# Patient Record
Sex: FEMALE | Race: WHITE | NOT HISPANIC OR LATINO | Employment: OTHER | URBAN - METROPOLITAN AREA
[De-identification: names, ages, dates, MRNs, and addresses within clinical notes are randomized per-mention and may not be internally consistent; named-entity substitution may affect disease eponyms.]

---

## 2017-03-20 RX ORDER — INSULIN DEGLUDEC 200 U/ML
INJECTION, SOLUTION SUBCUTANEOUS
Qty: 9 PEN | Refills: 3 | Status: SHIPPED | OUTPATIENT
Start: 2017-03-20 | End: 2017-06-12 | Stop reason: SDUPTHER

## 2017-03-24 DIAGNOSIS — IMO0002 UNCONTROLLED TYPE 2 DIABETES MELLITUS WITH COMPLICATION, WITH LONG-TERM CURRENT USE OF INSULIN: Primary | ICD-10-CM

## 2017-03-30 ENCOUNTER — LAB (OUTPATIENT)
Dept: LAB | Facility: HOSPITAL | Age: 67
End: 2017-03-30

## 2017-03-30 DIAGNOSIS — D50.9 IRON DEFICIENCY ANEMIA, UNSPECIFIED IRON DEFICIENCY ANEMIA TYPE: ICD-10-CM

## 2017-03-30 LAB
ALBUMIN SERPL-MCNC: 4.1 G/DL (ref 3.5–5.2)
ALBUMIN/GLOB SERPL: 1.8 G/DL (ref 1.1–2.4)
ALP SERPL-CCNC: 39 U/L (ref 38–116)
ALT SERPL W P-5'-P-CCNC: 24 U/L (ref 0–33)
ANION GAP SERPL CALCULATED.3IONS-SCNC: 11.6 MMOL/L
AST SERPL-CCNC: 24 U/L (ref 0–32)
BASOPHILS # BLD AUTO: 0.06 10*3/MM3 (ref 0–0.1)
BASOPHILS NFR BLD AUTO: 0.9 % (ref 0–1.1)
BILIRUB SERPL-MCNC: 0.5 MG/DL (ref 0.1–1.2)
BUN BLD-MCNC: 27 MG/DL (ref 6–20)
BUN/CREAT SERPL: 30.7 (ref 7.3–30)
CALCIUM SPEC-SCNC: 9.5 MG/DL (ref 8.5–10.2)
CHLORIDE SERPL-SCNC: 105 MMOL/L (ref 98–107)
CO2 SERPL-SCNC: 25.4 MMOL/L (ref 22–29)
CREAT BLD-MCNC: 0.88 MG/DL (ref 0.6–1.1)
DEPRECATED RDW RBC AUTO: 44.7 FL (ref 37–49)
EOSINOPHIL # BLD AUTO: 0.48 10*3/MM3 (ref 0–0.36)
EOSINOPHIL NFR BLD AUTO: 6.9 % (ref 1–5)
ERYTHROCYTE [DISTWIDTH] IN BLOOD BY AUTOMATED COUNT: 14.6 % (ref 11.7–14.5)
FERRITIN SERPL-MCNC: 108.6 NG/ML
GFR SERPL CREATININE-BSD FRML MDRD: 64 ML/MIN/1.73
GLOBULIN UR ELPH-MCNC: 2.3 GM/DL (ref 1.8–3.5)
GLUCOSE BLD-MCNC: 128 MG/DL (ref 74–124)
HCT VFR BLD AUTO: 35 % (ref 34–45)
HGB BLD-MCNC: 11.6 G/DL (ref 11.5–14.9)
HGB RETIC QN: 32.2 PG (ref 29.8–36.1)
IMM GRANULOCYTES # BLD: 0.06 10*3/MM3 (ref 0–0.03)
IMM GRANULOCYTES NFR BLD: 0.9 % (ref 0–0.5)
IMM RETICS NFR: 14 % (ref 3–15.8)
IRON 24H UR-MRATE: 64 MCG/DL (ref 37–145)
IRON SATN MFR SERPL: 15 % (ref 14–48)
LYMPHOCYTES # BLD AUTO: 1.37 10*3/MM3 (ref 1–3.5)
LYMPHOCYTES NFR BLD AUTO: 19.6 % (ref 20–49)
MCH RBC QN AUTO: 28.1 PG (ref 27–33)
MCHC RBC AUTO-ENTMCNC: 33.1 G/DL (ref 32–35)
MCV RBC AUTO: 84.7 FL (ref 83–97)
MONOCYTES # BLD AUTO: 0.46 10*3/MM3 (ref 0.25–0.8)
MONOCYTES NFR BLD AUTO: 6.6 % (ref 4–12)
NEUTROPHILS # BLD AUTO: 4.57 10*3/MM3 (ref 1.5–7)
NEUTROPHILS NFR BLD AUTO: 65.1 % (ref 39–75)
NRBC BLD MANUAL-RTO: 0 /100 WBC (ref 0–0)
PLATELET # BLD AUTO: 270 10*3/MM3 (ref 150–375)
PMV BLD AUTO: 10.7 FL (ref 8.9–12.1)
POTASSIUM BLD-SCNC: 4.4 MMOL/L (ref 3.5–4.7)
PROT SERPL-MCNC: 6.4 G/DL (ref 6.3–8)
RBC # BLD AUTO: 4.13 10*6/MM3 (ref 3.9–5)
RETICS/RBC NFR AUTO: 2.02 % (ref 0.6–2)
SODIUM BLD-SCNC: 142 MMOL/L (ref 134–145)
TIBC SERPL-MCNC: 426 MCG/DL (ref 249–505)
TRANSFERRIN SERPL-MCNC: 304 MG/DL (ref 200–360)
VIT B12 BLD-MCNC: 458 PG/ML (ref 250–999)
WBC NRBC COR # BLD: 7 10*3/MM3 (ref 4–10)

## 2017-03-30 PROCEDURE — 84466 ASSAY OF TRANSFERRIN: CPT | Performed by: INTERNAL MEDICINE

## 2017-03-30 PROCEDURE — 82607 VITAMIN B-12: CPT

## 2017-03-30 PROCEDURE — 85025 COMPLETE CBC W/AUTO DIFF WBC: CPT | Performed by: INTERNAL MEDICINE

## 2017-03-30 PROCEDURE — 83540 ASSAY OF IRON: CPT | Performed by: INTERNAL MEDICINE

## 2017-03-30 PROCEDURE — 85046 RETICYTE/HGB CONCENTRATE: CPT | Performed by: INTERNAL MEDICINE

## 2017-03-30 PROCEDURE — 82728 ASSAY OF FERRITIN: CPT | Performed by: INTERNAL MEDICINE

## 2017-03-30 PROCEDURE — 36415 COLL VENOUS BLD VENIPUNCTURE: CPT | Performed by: INTERNAL MEDICINE

## 2017-03-30 PROCEDURE — 80053 COMPREHEN METABOLIC PANEL: CPT | Performed by: INTERNAL MEDICINE

## 2017-04-04 ENCOUNTER — APPOINTMENT (OUTPATIENT)
Dept: LAB | Facility: HOSPITAL | Age: 67
End: 2017-04-04

## 2017-04-04 ENCOUNTER — OFFICE VISIT (OUTPATIENT)
Dept: ONCOLOGY | Facility: CLINIC | Age: 67
End: 2017-04-04

## 2017-04-04 VITALS
BODY MASS INDEX: 25.72 KG/M2 | SYSTOLIC BLOOD PRESSURE: 128 MMHG | RESPIRATION RATE: 12 BRPM | DIASTOLIC BLOOD PRESSURE: 72 MMHG | WEIGHT: 131 LBS | HEART RATE: 69 BPM | TEMPERATURE: 98.6 F | OXYGEN SATURATION: 99 % | HEIGHT: 60 IN

## 2017-04-04 DIAGNOSIS — D50.9 IRON DEFICIENCY ANEMIA, UNSPECIFIED IRON DEFICIENCY ANEMIA TYPE: Primary | ICD-10-CM

## 2017-04-04 PROCEDURE — G0463 HOSPITAL OUTPT CLINIC VISIT: HCPCS | Performed by: INTERNAL MEDICINE

## 2017-04-04 PROCEDURE — 99213 OFFICE O/P EST LOW 20 MIN: CPT | Performed by: INTERNAL MEDICINE

## 2017-04-04 RX ORDER — IBANDRONATE SODIUM 150 MG/1
TABLET, FILM COATED ORAL
COMMUNITY
Start: 2017-04-01 | End: 2017-07-07

## 2017-04-04 RX ORDER — METOCLOPRAMIDE 10 MG/1
10 TABLET ORAL 4 TIMES DAILY
COMMUNITY
Start: 2017-03-26 | End: 2017-04-21

## 2017-04-04 RX ORDER — TOPIRAMATE 25 MG/1
TABLET ORAL
COMMUNITY
Start: 2017-01-09 | End: 2017-04-04

## 2017-04-04 NOTE — PROGRESS NOTES
[facility] GROUP OUTPATIENT FOLLOW UP CLINIC VISIT    REASON FOR FOLLOW-UP:    1.  Iron deficiency anemia.  Her last intravenous Feraheme infusions or in October 2015.    HISTORY OF PRESENT ILLNESS:  Shirin Washington is a 66 y.o. female who returns today for follow up of the above issue.  She had blood counts done last week that fortunately are normal.    However, she continues to not feel well.  She was started on alprazolam for her vertigo which she states has improved her symptoms by about 75%.  She was actually weaned off of this and her symptoms worsened significantly and so now she is back on it.  She was diagnosed with osteoporosis and was started on Boniva which has caused her some constipation and gastric upset.  As result of this, she was started on Reglan but she has not noticed any significant difference in her symptoms.  She reports ongoing fatigue.  She is only able to play 9 holes of golf now.  She has dyspnea on exertion and some chest tightness with significant activity.  She is scheduled to see Dr. Araujo with cardiology within the next couple of weeks after having some cardiac testing showing an elevated coronary calcium score.  A couple of years ago she did have an echocardiogram that showed a normal left ventricular ejection fraction and had a coronary catheterization that showed an isolated 50% narrowing of the LAD but no other significant coronary artery disease.    She denies any bleeding.  She continues to have some back discomfort following her surgery.      PAST MEDICAL, SURGICAL, FAMILY, AND SOCIAL HISTORIES were reviewed with the patient and in the electronic medical record, and were updated if indicated.    ALLERGIES:  Allergies   Allergen Reactions   • Topamax [Topiramate]        MEDICATIONS:  The medication list has been reviewed with the patient by the medical assistant, and the list has been updated in the electronic medical record, which I reviewed.  Medication dosages and  "frequencies were confirmed to be accurate.    REVIEW OF SYSTEMS:  PAIN:  See Vital Signs below.  GENERAL:  No fevers, chills, night sweats, or unintended weight loss.  Fatigue.  See history of present illness.  SKIN:  No rashes or non-healing lesions.  HEME/LYMPH:  No abnormal bleeding.  No palpable lymphadenopathy.  EYES:  No vision changes or diplopia.  ENT:  Vertigo as noted in the history of present illness  RESPIRATORY:  No cough, shortness of breath, hemoptysis, or wheezing.  CARDIOVASCULAR:  See history of present illness  GASTROINTESTINAL:  See history of present illness  GENITOURINARY:  No dysuria or hematuria.  MUSCULOSKELETAL:  No joint pain, swelling, or erythema.  NEUROLOGIC:  No dizziness, loss of consciousness, or seizures.  PSYCHIATRIC:  No depression, anxiety, or mood changes.    Vitals:    04/04/17 1009   BP: 128/72   Pulse: 69   Resp: 12   Temp: 98.6 °F (37 °C)   TempSrc: Oral   SpO2: 99%   Weight: 131 lb (59.4 kg)   Height: 59.72\" (151.7 cm)  Comment: new height   PainSc: 0-No pain       PHYSICAL EXAMINATION:  GENERAL:  Well-developed well-nourished female; awake, alert and oriented, in no acute distress.  SKIN:  Warm and dry, without rashes, purpura, or petechiae.  HEAD:  Normocephalic, atraumatic.  EYES:  Pupils equal, round and reactive to light.  Extraocular movements intact.  Conjunctivae normal.  EARS:  Hearing intact.  NOSE:  Septum midline.  No excoriations or nasal discharge.  MOUTH:  No stomatitis or ulcers.  Lips are normal.  THROAT:  Oropharynx without lesions or exudates.  NECK:  Supple with good range of motion; no thyromegaly or masses; no JVD or bruits.  LYMPHATICS:  No cervical, supraclavicular, axillary, or inguinal lymphadenopathy.  CHEST:  Lungs are clear to auscultation bilaterally.  No wheezes, rales, or rhonchi.  HEART:  Regular rate; normal rhythm.  No murmurs, gallops or rubs.  ABDOMEN:  Soft, non-tender, non-distended.  Normal active bowel sounds.  No " organomegaly.  EXTREMITIES:  No clubbing, cyanosis, or edema.  NEUROLOGICAL:  No focal neurologic deficits.    DIAGNOSTIC DATA:  Lab Results   Component Value Date    WBC 7.00 03/30/2017    HGB 11.6 03/30/2017    HCT 35.0 03/30/2017    MCV 84.7 03/30/2017     03/30/2017     Vitamin B12 458  Ferritin 108, down from 207  Complete metabolic panel normal    ASSESSMENT:  This is a 66 y.o. female with:  1.  History of iron deficiency anemia: She required intravenous Feraheme last in October 2015.  Iron studies are normal at this time.  Her blood counts are normal.  The etiology of her iron deficiency is not entirely clear.  Her ferritin remains normal but has dropped from 207 to 108 in the absence of obvious bleeding.  We will continue to monitor this.  There is no indication to pursue iron supplementation at this point.  In addition, her B12 level was at the low end of the normal range.  She received some B12 injections.  Her vitamin B12 level is normal.  We will continue intermittent monitoring.  2.  Fatigue with dyspnea on exertion and chest tightness: An echocardiogram on 7/3/2014 showed normal left ventricular ejection fraction of 64%.  Grade 1 diastolic dysfunction was noted.  Borderline concentric LVH was observed.  Mild aortic and trace tricuspid and pulmonic valve regurgitation noted.  A cardiac catheterization on 1/10/2014 showed a 50% narrowing of the LAD but no other evidence for coronary artery disease.  She is going to see Dr. Araujo for a cardiovascular evaluation in the near future.  Certainly ruling out cardiovascular causes of her symptoms seems pertinent.  Otherwise, I suggested that polypharmacy could be contributing if there are no other obvious reasons for her symptoms.  The alprazolam is helping her vertigo but could certainly be causing some fatigue.  Beta blockers can cause fatigue.  Reglan could be contributing to symptoms.  She is on Ambien 10 mg nightly for chronic insomnia.  3.  Gastric  upset potentially related to Boniva: I advised her to speak with her treating physician regarding alternative medication.  She will likely discontinue the Reglan as well since she has noted no benefit from this.  She could potentially have gastroparesis.    PLAN:  1.  Return in 6 months for follow-up with a laboratory evaluation done a few days prior to that.  I can certainly see her sooner if she is feeling worrisome believes it may be related to worsening anemia or iron deficiency.

## 2017-04-19 ENCOUNTER — OFFICE VISIT (OUTPATIENT)
Dept: CARDIOLOGY | Facility: CLINIC | Age: 67
End: 2017-04-19

## 2017-04-19 VITALS
SYSTOLIC BLOOD PRESSURE: 140 MMHG | HEIGHT: 60 IN | DIASTOLIC BLOOD PRESSURE: 76 MMHG | BODY MASS INDEX: 25.72 KG/M2 | WEIGHT: 131 LBS | HEART RATE: 78 BPM

## 2017-04-19 DIAGNOSIS — E11.9 TYPE 2 DIABETES MELLITUS WITHOUT COMPLICATION, WITH LONG-TERM CURRENT USE OF INSULIN (HCC): ICD-10-CM

## 2017-04-19 DIAGNOSIS — R07.89 CHEST DISCOMFORT: Primary | ICD-10-CM

## 2017-04-19 DIAGNOSIS — Z79.4 TYPE 2 DIABETES MELLITUS WITHOUT COMPLICATION, WITH LONG-TERM CURRENT USE OF INSULIN (HCC): ICD-10-CM

## 2017-04-19 DIAGNOSIS — R93.1 AGATSTON CORONARY ARTERY CALCIUM SCORE GREATER THAN 400: ICD-10-CM

## 2017-04-19 DIAGNOSIS — I10 ESSENTIAL HYPERTENSION: ICD-10-CM

## 2017-04-19 PROCEDURE — 99204 OFFICE O/P NEW MOD 45 MIN: CPT | Performed by: INTERNAL MEDICINE

## 2017-04-19 PROCEDURE — 93000 ELECTROCARDIOGRAM COMPLETE: CPT | Performed by: INTERNAL MEDICINE

## 2017-04-19 NOTE — PROGRESS NOTES
Date of Office Visit: 2017  Encounter Provider: Tomás Araujo MD  Place of Service: [facility] CARDIOLOGY  Patient Name: Shirin Washington  :1950    Chief Complaint   Patient presents with   • Abnormal Imaging     CT Calcium Score    :     HPI: Shirin Washington is a 66 y.o. female who presents today at the request of Dr. Domingo regarding an abnormal CACS.  I actually saw her in our CPU in 2014 when she presented with symptoms concerning for unstable angina.  Cardiac catheterization revealed diffuse extraluminal coronary artery calcification, and a 50% lesion in the mid LAD which was not felt to be hemodynamically significant.  I have not seen her since.    She recently had the CACS performed for risk stratification; her score was 655, and it was fairly evenly distributed among her coronaries.      She will feel chest tightness/dyspnea if she really exerts herself to a very significant degree (ie trying to walk much faster than she's able, or going up a long tall hill).  She doesn't have any symptoms with regular levels of activity.  She has become sedentary since back surgery and is trying to increase her physical activity.  She denies dyspnea with regular activities, edema, orthopnea, PND, palpitations, or syncope.        Past Medical History:   Diagnosis Date   • Anemia    • Arthritis    • Back pain    • Background diabetic retinopathy of right eye determined by examination    • Coronary atherosclerosis     cath 2014 with 50% mid LAD, otherwise unremarkable   • Diabetes mellitus type 2, insulin dependent    • Gastroparesis due to secondary diabetes    • GERD (gastroesophageal reflux disease)    • History of URI (upper respiratory infection)    • Hypercholesteremia    • Hyperlipidemia    • Hypertension    • Iron deficiency anemia    • Left-sided weakness     ARM AND LEG   • Leukocytosis     MILD   • Osteopenia    • Peripheral neuropathy    • Vertigo        Past  Surgical History:   Procedure Laterality Date   • BACK SURGERY      BY DR NIXON   • CARDIAC CATHETERIZATION      ARTERIAL CATHETERIZATION   • CHOLECYSTECTOMY     • HYSTERECTOMY     • KNEE SURGERY Right        Social History     Social History   • Marital status:      Spouse name: N/A   • Number of children: N/A   • Years of education: N/A     Occupational History   • 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   Cardiovascular: Positive for chest pain and dyspnea on exertion.   Endocrine: Positive for cold intolerance.   Musculoskeletal: Positive for back pain and joint pain.   Gastrointestinal: Positive for nausea.       Allergies   Allergen Reactions   • Topamax [Topiramate]          Current Outpatient Prescriptions:   •  ALPRAZolam (XANAX) 0.25 MG tablet, 2 (Two) Times a Day., Disp: , Rfl:   •  amLODIPine (NORVASC) 2.5 MG tablet, Take  by mouth Daily., Disp: , Rfl:   •  aspirin 81 MG EC tablet, Take  by mouth Daily., Disp: , Rfl:   •  atenolol (TENORMIN) 25 MG tablet, Take 25 mg by mouth Daily., Disp: , Rfl:   •  atorvastatin (LIPITOR) 80 MG tablet, Take  by mouth Daily., Disp: , Rfl:   •  fenofibrate (TRICOR) 145 MG tablet, Take  by mouth Daily., Disp: , Rfl:   •  gentamicin (GARAMYCIN) 0.3 % ophthalmic solution, , Disp: , Rfl:   •  ibandronate (BONIVA) 150 MG tablet, Every 30 (Thirty) Days., Disp: , Rfl:   •  insulin aspart (novoLOG FLEXPEN) 100 UNIT/ML solution pen-injector sc pen,  "Inject 40 Units under the skin 3 (Three) Times a Day With Meals. (Patient taking differently: Inject 10 Units under the skin 3 (Three) Times a Day With Meals.), Disp: 15 pen, Rfl: 3  •  meloxicam (MOBIC) 15 MG tablet, Take 15 mg by mouth daily., Disp: , Rfl:   •  metoclopramide (REGLAN) 10 MG tablet, , Disp: , Rfl:   •  ONETOUCH DELICA LANCETS 33G misc, , Disp: , Rfl:   •  TRESIBA FLEXTOUCH 200 UNIT/ML solution pen-injector, INJECT 20 UNITS UNDER THE SKIN DAILY, Disp: 9 pen, Rfl: 3  •  triamterene-hydrochlorothiazide (MAXZIDE-25) 37.5-25 MG per tablet, Take  by mouth. TAKES ONE HALF TABLET DAILY, Disp: , Rfl:   •  zolpidem (AMBIEN) 10 MG tablet, Take  by mouth., Disp: , Rfl:   •  glucose blood test strip, OneTouch Ultra Blue In Vitro Strip TO TEST 8 TIMES DAILY, Disp: 720 each, Rfl: 1  •  Insulin Pen Needle (BD PEN NEEDLE SOCO U/F) 32G X 4 MM misc, 4 times daily, Disp: 200 each, Rfl: 3     Objective:     Vitals:    04/19/17 1101   BP: 140/76   Pulse: 78   Weight: 131 lb (59.4 kg)   Height: 60\" (152.4 cm)     Body mass index is 25.58 kg/(m^2).    Physical Exam   Constitutional: She is oriented to person, place, and time. She appears well-developed and well-nourished.   HENT:   Head: Normocephalic.   Nose: Nose normal.   Mouth/Throat: Oropharynx is clear and moist.   Eyes: Conjunctivae and EOM are normal. Pupils are equal, round, and reactive to light.   Neck: Normal range of motion. No JVD present.   Cardiovascular: Normal rate, regular rhythm, normal heart sounds and intact distal pulses.    No murmur heard.  Pulmonary/Chest: Effort normal and breath sounds normal.   Abdominal: Soft. She exhibits no mass. There is no tenderness.   Musculoskeletal: Normal range of motion. She exhibits no edema.   Lymphadenopathy:     She has no cervical adenopathy.   Neurological: She is alert and oriented to person, place, and time. No cranial nerve deficit.   Skin: Skin is warm and dry. No rash noted.   Psychiatric: She has a " normal mood and affect. Her behavior is normal. Judgment and thought content normal.   Vitals reviewed.        ECG 12 Lead  Date/Time: 4/19/2017 4:38 PM  Performed by: TOMÁS ARAUJO  Authorized by: TOMÁS ARAUJO   Comparison: compared with previous ECG   Similar to previous ECG  Rhythm: sinus rhythm  Conduction: conduction normal  ST Segments: ST segments normal  T Waves: T waves normal  QRS axis: normal  Other: no other findings  Clinical impression: normal ECG              Assessment:       Diagnosis Plan   1. Chest discomfort  Stress Test With Myocardial Perfusion One Day   2. Agatston coronary artery calcium score greater than 400  Stress Test With Myocardial Perfusion One Day   3. Essential hypertension     4. Type 2 diabetes mellitus without complication, with long-term current use of insulin            Plan:       Mrs. Washington is an insulin-dependent diabetic with a known history of significant coronary artery calcification and nonobstructive CAD.  Her CACS is moderately elevated (but to be honest, I am actually surprised it's not higher than it is given her history)!  She has symptoms of chest discomfort and dyspnea with significant levels of exertion but not with regular activities.  I have recommended a regadenoson Myoview stress test for further evaluation.      If this is normal, I recommend improved medical therapy (she's on aspirin, atenolol, and atorvastatin, but may tolerate a higher dose of the beta blocker). Obviously if it's abnormal, then further workup will be recommended.      I would recommend cessation of meloxicam given its increased incidence of cardiovascular mortality.      Sincerely,       Tomás Araujo MD

## 2017-04-21 ENCOUNTER — OFFICE VISIT (OUTPATIENT)
Dept: ORTHOPEDIC SURGERY | Facility: CLINIC | Age: 67
End: 2017-04-21

## 2017-04-21 VITALS — TEMPERATURE: 98.2 F | WEIGHT: 131 LBS | HEIGHT: 60 IN | BODY MASS INDEX: 25.72 KG/M2

## 2017-04-21 DIAGNOSIS — M25.552 HIP PAIN, LEFT: Primary | ICD-10-CM

## 2017-04-21 PROCEDURE — 73502 X-RAY EXAM HIP UNI 2-3 VIEWS: CPT | Performed by: NURSE PRACTITIONER

## 2017-04-21 PROCEDURE — 99213 OFFICE O/P EST LOW 20 MIN: CPT | Performed by: NURSE PRACTITIONER

## 2017-04-21 NOTE — PROGRESS NOTES
Patient: Shirin Washington  YOB: 1950 66 y.o. female  Medical Record Number: [id number]    Chief Complaints:   Chief Complaint   Patient presents with   • Left Hip - Establish Care       History of Present Illness:Shirin Washington is a 66 y.o. female who presents With complaints of left hip pain.  Patient reports that it started several months ago.  She describes it as a moderate intermittent grinding type pain worse with standing and walking slightly better with rest.  She does have a significant history of low back issues with previous back surgery.  She recently saw her primary care physician who looked at her DEXA scan and told her she should come here.  She was recently started on Boniva for osteoporosis by her gynecologist    Allergies:   Allergies   Allergen Reactions   • Topamax [Topiramate]        Medications:   Current Outpatient Prescriptions   Medication Sig Dispense Refill   • ALPRAZolam (XANAX) 0.25 MG tablet 2 (Two) Times a Day.     • amLODIPine (NORVASC) 2.5 MG tablet Take  by mouth Daily.     • aspirin 81 MG EC tablet Take  by mouth Daily.     • atenolol (TENORMIN) 25 MG tablet Take 25 mg by mouth Daily.     • atorvastatin (LIPITOR) 80 MG tablet Take  by mouth Daily.     • fenofibrate (TRICOR) 145 MG tablet Take  by mouth Daily.     • glucose blood test strip OneTouch Ultra Blue In Vitro Strip TO TEST 8 TIMES DAILY 720 each 1   • insulin aspart (novoLOG FLEXPEN) 100 UNIT/ML solution pen-injector sc pen Inject 40 Units under the skin 3 (Three) Times a Day With Meals. (Patient taking differently: Inject 10 Units under the skin 3 (Three) Times a Day With Meals.) 15 pen 3   • Insulin Pen Needle (BD PEN NEEDLE SOCO U/F) 32G X 4 MM misc 4 times daily 200 each 3   • meloxicam (MOBIC) 15 MG tablet Take 15 mg by mouth daily.     • ONETOUCH DELICA LANCETS 33G misc      • TRESIBA FLEXTOUCH 200 UNIT/ML solution pen-injector INJECT 20 UNITS UNDER THE SKIN DAILY 9 pen 3   •  "triamterene-hydrochlorothiazide (MAXZIDE-25) 37.5-25 MG per tablet Take  by mouth. TAKES ONE HALF TABLET DAILY     • zolpidem (AMBIEN) 10 MG tablet Take  by mouth.     • ibandronate (BONIVA) 150 MG tablet Every 30 (Thirty) Days.       No current facility-administered medications for this visit.          The following portions of the patient's history were reviewed and updated as appropriate: allergies, current medications, past family history, past medical history, past social history, past surgical history and problem list.    Review of Systems:   A 14 point review of systems was performed. All systems negative except pertinent positives/negative listed in HPI above    Physical Exam:   Vitals:    04/21/17 1320   Temp: 98.2 °F (36.8 °C)   TempSrc: Temporal Artery    Weight: 131 lb (59.4 kg)   Height: 60\" (152.4 cm)       General: A and O x 3, ASA, NAD    SCLERA:    Normal    DENTITION:   Normal  Skin clear no unusual lesions noted  Left hip patient is nontender palpation she has mild pain with internal/external rotation with a negative Stinchfield negative logroll calf soft nontender neurologic intact pedal pulses normal bilaterally    Radiology:  Xrays 2 views left hip were ordered and reviewed today secondary to pain and show some arthritic changes.  No comparative views available    Assessment/Plan:  Left hip pain    It's difficult to tell whether the patient's pain is coming from her hip or from her lower back.  We will proceed with a fluoroscopy guided cortisone injection of the left hip joint and the patient will follow up with Dr. Wen in approximately 2-3 weeks after to see if that helps differentiate the etiology of her pain.  She is currently on meloxicam.  In addition we will defer her treatment for osteoporosis to her gynecologist or primary care physician.  "

## 2017-04-27 ENCOUNTER — HOSPITAL ENCOUNTER (OUTPATIENT)
Dept: CARDIOLOGY | Facility: HOSPITAL | Age: 67
Discharge: HOME | End: 2017-04-27
Attending: INTERNAL MEDICINE

## 2017-04-27 DIAGNOSIS — R93.1 AGATSTON CORONARY ARTERY CALCIUM SCORE GREATER THAN 400: ICD-10-CM

## 2017-04-27 DIAGNOSIS — R07.89 CHEST DISCOMFORT: ICD-10-CM

## 2017-05-01 ENCOUNTER — APPOINTMENT (OUTPATIENT)
Dept: GENERAL RADIOLOGY | Facility: HOSPITAL | Age: 67
End: 2017-05-01

## 2017-05-01 ENCOUNTER — HOSPITAL ENCOUNTER (OUTPATIENT)
Dept: CARDIOLOGY | Facility: HOSPITAL | Age: 67
Discharge: HOME | End: 2017-05-01
Attending: INTERNAL MEDICINE | Admitting: INTERNAL MEDICINE

## 2017-05-01 LAB
BH CV NUCLEAR PRIOR STUDY: 2
BH CV STRESS BP STAGE 1: NORMAL
BH CV STRESS BP STAGE 2: NORMAL
BH CV STRESS DURATION MIN STAGE 1: 3
BH CV STRESS DURATION MIN STAGE 2: 1
BH CV STRESS DURATION SEC STAGE 1: 0
BH CV STRESS DURATION SEC STAGE 2: 30
BH CV STRESS GRADE STAGE 1: 10
BH CV STRESS GRADE STAGE 2: 12
BH CV STRESS HR STAGE 1: 151
BH CV STRESS HR STAGE 2: 164
BH CV STRESS METS STAGE 1: 5
BH CV STRESS METS STAGE 2: 7.5
BH CV STRESS PROTOCOL 1: NORMAL
BH CV STRESS RECOVERY BP: NORMAL MMHG
BH CV STRESS RECOVERY HR: 98 BPM
BH CV STRESS SPEED STAGE 1: 1.7
BH CV STRESS SPEED STAGE 2: 2.5
BH CV STRESS STAGE 1: 1
BH CV STRESS STAGE 2: 2
LV EF NUC BP: 72 %
MAXIMAL PREDICTED HEART RATE: 152 BPM
PERCENT MAX PREDICTED HR: 107.89 %
STRESS BASELINE BP: NORMAL MMHG
STRESS BASELINE HR: 94 BPM
STRESS PERCENT HR: 127 %
STRESS POST ESTIMATED WORKLOAD: 6 METS
STRESS POST EXERCISE DUR MIN: 4 MIN
STRESS POST EXERCISE DUR SEC: 30 SEC
STRESS POST PEAK BP: NORMAL MMHG
STRESS POST PEAK HR: 164 BPM
STRESS TARGET HR: 129 BPM

## 2017-05-01 PROCEDURE — 0 TECHNETIUM TETROFOSMIN KIT: Performed by: INTERNAL MEDICINE

## 2017-05-01 PROCEDURE — 78452 HT MUSCLE IMAGE SPECT MULT: CPT | Performed by: INTERNAL MEDICINE

## 2017-05-01 PROCEDURE — 78452 HT MUSCLE IMAGE SPECT MULT: CPT

## 2017-05-01 PROCEDURE — 93017 CV STRESS TEST TRACING ONLY: CPT

## 2017-05-01 PROCEDURE — A9502 TC99M TETROFOSMIN: HCPCS | Performed by: INTERNAL MEDICINE

## 2017-05-01 PROCEDURE — 93018 CV STRESS TEST I&R ONLY: CPT | Performed by: INTERNAL MEDICINE

## 2017-05-01 PROCEDURE — 93016 CV STRESS TEST SUPVJ ONLY: CPT | Performed by: INTERNAL MEDICINE

## 2017-05-01 RX ORDER — ATENOLOL 50 MG/1
50 TABLET ORAL DAILY
Qty: 30 TABLET | Refills: 6 | Status: SHIPPED | OUTPATIENT
Start: 2017-05-01

## 2017-05-01 RX ADMIN — TETROFOSMIN 1 DOSE: 1.38 INJECTION, POWDER, LYOPHILIZED, FOR SOLUTION INTRAVENOUS at 09:25

## 2017-05-01 RX ADMIN — TETROFOSMIN 1 DOSE: 1.38 INJECTION, POWDER, LYOPHILIZED, FOR SOLUTION INTRAVENOUS at 08:30

## 2017-05-05 ENCOUNTER — APPOINTMENT (OUTPATIENT)
Dept: GENERAL RADIOLOGY | Facility: HOSPITAL | Age: 67
End: 2017-05-05

## 2017-05-12 ENCOUNTER — TRANSCRIBE ORDERS (OUTPATIENT)
Dept: ADMINISTRATIVE | Facility: HOSPITAL | Age: 67
End: 2017-05-12

## 2017-05-12 DIAGNOSIS — M81.0 OSTEOPOROSIS: Primary | ICD-10-CM

## 2017-05-18 PROBLEM — M81.0 OSTEOPOROSIS: Status: ACTIVE | Noted: 2017-05-18

## 2017-05-19 ENCOUNTER — HOSPITAL ENCOUNTER (OUTPATIENT)
Dept: INFUSION THERAPY | Facility: HOSPITAL | Age: 67
Discharge: HOME | End: 2017-05-19
Admitting: INTERNAL MEDICINE

## 2017-05-19 VITALS
DIASTOLIC BLOOD PRESSURE: 62 MMHG | BODY MASS INDEX: 25.91 KG/M2 | TEMPERATURE: 97.2 F | HEIGHT: 60 IN | SYSTOLIC BLOOD PRESSURE: 148 MMHG | WEIGHT: 132 LBS | RESPIRATION RATE: 20 BRPM | OXYGEN SATURATION: 100 % | HEART RATE: 65 BPM

## 2017-05-19 DIAGNOSIS — M81.0 OSTEOPOROSIS: ICD-10-CM

## 2017-05-19 PROCEDURE — 96401 CHEMO ANTI-NEOPL SQ/IM: CPT

## 2017-05-19 PROCEDURE — 25010000002 DENOSUMAB 60 MG/ML SOLUTION: Performed by: INTERNAL MEDICINE

## 2017-05-19 RX ADMIN — DENOSUMAB 60 MG: 60 INJECTION SUBCUTANEOUS at 10:52

## 2017-07-07 ENCOUNTER — OFFICE VISIT (OUTPATIENT)
Dept: ENDOCRINOLOGY | Age: 67
End: 2017-07-07

## 2017-07-07 VITALS
HEIGHT: 60 IN | BODY MASS INDEX: 26.19 KG/M2 | DIASTOLIC BLOOD PRESSURE: 78 MMHG | SYSTOLIC BLOOD PRESSURE: 136 MMHG | HEART RATE: 87 BPM | OXYGEN SATURATION: 97 % | WEIGHT: 133.4 LBS

## 2017-07-07 DIAGNOSIS — E16.1 HYPERINSULINISM: ICD-10-CM

## 2017-07-07 DIAGNOSIS — Z79.4 ENCOUNTER FOR LONG-TERM (CURRENT) USE OF INSULIN (HCC): ICD-10-CM

## 2017-07-07 DIAGNOSIS — E78.5 HYPERLIPIDEMIA, UNSPECIFIED HYPERLIPIDEMIA TYPE: ICD-10-CM

## 2017-07-07 DIAGNOSIS — M81.0 OSTEOPOROSIS: ICD-10-CM

## 2017-07-07 DIAGNOSIS — IMO0002 UNCONTROLLED TYPE 2 DIABETES MELLITUS WITH BACKGROUND RETINOPATHY: ICD-10-CM

## 2017-07-07 DIAGNOSIS — IMO0002 UNCONTROLLED TYPE 2 DIABETES MELLITUS WITH COMPLICATION, WITH LONG-TERM CURRENT USE OF INSULIN: Primary | ICD-10-CM

## 2017-07-07 PROCEDURE — 99214 OFFICE O/P EST MOD 30 MIN: CPT | Performed by: INTERNAL MEDICINE

## 2017-07-07 NOTE — PROGRESS NOTES
67 y.o.    Patient Care Team:  Duncan Domingo MD as PCP - General (Internal Medicine)  Axel Lobato MD as Consulting Physician (Hematology and Oncology)  Chika Ospina MD as Referring Physician (General Surgery)    Chief Complaint:      F/U TYPE 2 DIABETES, UNCONTROLLED.  NO RECENT LABS.  Subjective   HPIPatient is a 67-year-old white female with a past history of uncontrolled type 2 diabetes mellitus came for follow-up  She's a former patient of Dr. Tam  PATIENT IS CURRENTLY TAKING TRESIBA 24 UNITS AT BEDTIME AND NOVOLOG ONE UNIT FOR EVERY 8 CARBOHYDRATES and also one unit for every 25 mg blood sugar over 125  Patient reports her blood sugars are between  for the most part  She recently joined [employer] and is trying to control her diet and she started playing golf again after spine started feeling better  Uncontrolled type 2 diabetes mellitus with retinopathy  Patient is background diabetic retinopathy has regular eye appointments  Patient denied any knowledge of diabetic neuropathy or nephropathy  Hypoglycemia  Patient periodically gets hypoglycemia usually mostly during the day.  Diabetes   She has type 2 diabetes mellitus. No MedicAlert identification noted. The initial diagnosis of diabetes was made 20 years ago. Hypoglycemia symptoms include dizziness and hunger. Pertinent negatives for hypoglycemia include no confusion, headaches, mood changes, nervousness/anxiousness, pallor, seizures, sleepiness, speech difficulty, sweats or tremors. Associated symptoms include polyphagia. Pertinent negatives for diabetes include no blurred vision, no chest pain, no fatigue, no foot paresthesias, no foot ulcerations, no polydipsia, no polyuria, no visual change, no weakness and no weight loss. Pertinent negatives for hypoglycemia complications include no blackouts, no hospitalization, no nocturnal hypoglycemia, no required assistance and no required glucagon injection. Symptoms are stable. Pertinent  negatives for diabetic complications include no CVA, heart disease, impotence, nephropathy, peripheral neuropathy, PVD or retinopathy. Risk factors for coronary artery disease include dyslipidemia, family history and hypertension. Current diabetic treatment includes diet and insulin injections. She is compliant with treatment all of the time. She is currently taking insulin pre-breakfast, pre-lunch, pre-dinner and at bedtime. Insulin injections are given by patient. Rotation sites for injection include the abdominal wall. Her weight is increasing steadily. She is following a generally healthy diet. Meal planning includes avoidance of concentrated sweets and carbohydrate counting. She has not had a previous visit with a dietitian. She participates in exercise three times a week. She monitors blood glucose at home 5+ x per day. She monitors urine at home <1 x per month. Blood glucose monitoring compliance is good. There is no change in her home blood glucose trend. Her breakfast blood glucose is taken between 6-7 am. Her breakfast blood glucose range is generally 110-130 mg/dl. Her lunch blood glucose is taken between 10-11 am. Her lunch blood glucose range is generally 130-140 mg/dl. Her dinner blood glucose is taken between 6-7 pm. Her dinner blood glucose range is generally 180-200 mg/dl. Her highest blood glucose is 180-200 mg/dl. Her overall blood glucose range is 140-180 mg/dl. She does not see a podiatrist.Eye exam is current.            Interval History:     Summary  Uncontrolled type 2 diabetes mellitus  Patient was diagnosed approximately 18 years ago. She reported that she's taking Levemir 15 units at night and takes NovoLog one unit for every 10 carbohydrates and a correction scale of one unit for every 25 mg over 125.  Patient reported that most of the blood sugars are in the 100-200 range. Occasionally she has a blood sugar 75-90. She keeps adjusting her insulin based on the blood  sugars      Uncontrolled type 2 diabetes mellitus with retinopathy.  Patient reports to have had background diabetic retinopathy in the right eye which apparently stable for the past several years.  Patient denied any knowledge of symptoms of diabetic neuropathy or nephropathy.  Patient also denied any coronary artery disease or TIA or stroke.  Patient is physically very active, she plays golf almost daily.      Hypoglycemia  Patient periodically gets iron infusions for anemia and has had episodes of hypoglycemia.  Hyperlipidemia  Patient is currently on Lipitor 80 mg as well as TriCor 145 mg daily. She denies any side effects on the medication.  Hypertension  Patient is currently on Norvasc 2.5 mg daily as well as Tenormin 25 mg daily, Maxzide one tablet daily. Her blood pressure is stable currently.  Patient denied any side effects on the medication    The following portions of the patient's history were reviewed and updated as appropriate: allergies, current medications, past family history, past medical history, past social history, past surgical history and problem list.    Past Medical History:   Diagnosis Date   • Anemia    • Arthritis    • Back pain    • Background diabetic retinopathy of right eye determined by examination    • Coronary atherosclerosis     cath 1/2014 with 50% mid LAD, otherwise unremarkable   • Diabetes mellitus type 2, insulin dependent    • Gastroparesis due to secondary diabetes    • GERD (gastroesophageal reflux disease)    • History of URI (upper respiratory infection)    • Hypercholesteremia    • Hyperlipidemia    • Hypertension    • Iron deficiency anemia    • Left-sided weakness     ARM AND LEG   • Leukocytosis     MILD   • Osteopenia    • Peripheral neuropathy    • Retinopathy    • Vertigo      Family History   Problem Relation Age of Onset   • Polycythemia Mother      Progressed to secondary myelofibrosis   • Cirrhosis Father    • Diabetes Other    • Cancer Neg Hx      Social  History     Social History   • Marital status:      Spouse name: N/A   • Number of children: N/A   • Years of education: N/A     Occupational History   • 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[employer] [place]     Social History Main Topics   • Smoking status: Never Smoker   • Smokeless tobacco: Never Used   • Alcohol use Yes      Comment: OCCASIONAL   • Drug use: No   • Sexual activity: Defer     Other Topics Concern   • Not on file     Social History Narrative     Allergies   Allergen Reactions   • Topamax [Topiramate]        Current Outpatient Prescriptions:   •  ALPRAZolam (XANAX) 0.25 MG tablet, 2 (Two) Times a Day., Disp: , Rfl:   •  amLODIPine (NORVASC) 2.5 MG tablet, Take  by mouth Daily., Disp: , Rfl:   •  aspirin 81 MG EC tablet, Take  by mouth Daily., Disp: , Rfl:   •  atenolol (TENORMIN) 50 MG tablet, Take 1 tablet by mouth Daily., Disp: 30 tablet, Rfl: 6  •  atorvastatin (LIPITOR) 80 MG tablet, Take  by mouth Daily., Disp: , Rfl:   •  denosumab (PROLIA) 60 MG/ML solution syringe, Inject 60 mg under the skin Every 6 (Six) Months., Disp: , Rfl:   •  fenofibrate (TRICOR) 145 MG tablet, Take  by mouth Daily., Disp: , Rfl:   •  glucose blood test strip, OneTouch Ultra Blue In Vitro Strip TO TEST 8 TIMES DAILY, Disp: 720 each, Rfl: 1  •  ibandronate (BONIVA) 150 MG tablet, Every 30 (Thirty) Days., Disp: , Rfl:   •  insulin aspart (novoLOG FLEXPEN) 100 UNIT/ML solution pen-injector sc pen, Inject 40 Units under the skin 3 (Three) Times a Day With Meals. (Patient taking differently: Inject 10 Units under the skin 3 (Three) Times a Day With Meals.), Disp: 15 pen, Rfl: 3  •  Insulin Degludec (TRESIBA FLEXTOUCH) 200 UNIT/ML solution pen-injector, Inject 24 Units under the skin Daily., Disp: 9 pen, Rfl: 0  •  Insulin Pen Needle (BD PEN NEEDLE SOCO U/F) 32G X 4 MM misc, 4 times daily, Disp: 200 each, Rfl: 3  •  meloxicam (MOBIC) 15 MG tablet, Take 15 mg by mouth daily., Disp: , Rfl:   •  ONETOUCH DELICA  "LANCETS 33G misc, , Disp: , Rfl:   •  triamterene-hydrochlorothiazide (MAXZIDE-25) 37.5-25 MG per tablet, Take  by mouth. TAKES ONE HALF TABLET DAILY, Disp: , Rfl:   •  zolpidem (AMBIEN) 10 MG tablet, Take  by mouth., Disp: , Rfl:         Review of Systems   Constitutional: Negative for fatigue and weight loss.   Eyes: Negative for blurred vision.   Cardiovascular: Negative for chest pain.   Endocrine: Positive for polyphagia. Negative for polydipsia and polyuria.   Genitourinary: Negative for impotence.   Skin: Negative for pallor.   Neurological: Positive for dizziness. Negative for tremors, seizures, speech difficulty, weakness and headaches.   Psychiatric/Behavioral: Negative for confusion. The patient is not nervous/anxious.    All other systems reviewed and are negative.      Objective       Vitals:    07/07/17 1013   BP: 136/78   Pulse: 87   SpO2: 97%   Weight: 133 lb 6.4 oz (60.5 kg)   Height: 60\" (152.4 cm)     Body mass index is 26.05 kg/(m^2).      Physical Exam   Constitutional: She is oriented to person, place, and time. She appears well-developed and well-nourished.   HENT:   Head: Normocephalic and atraumatic.   Eyes: EOM are normal. Pupils are equal, round, and reactive to light.   Neck: Normal range of motion. No thyromegaly present.   Cardiovascular: Normal rate, regular rhythm, normal heart sounds and intact distal pulses.    Pulmonary/Chest: Effort normal and breath sounds normal.   Abdominal: Soft. Bowel sounds are normal. She exhibits no distension. There is no tenderness.   Musculoskeletal: Normal range of motion. She exhibits no edema.   Neurological: She is alert and oriented to person, place, and time. She has normal reflexes.   Skin: Skin is warm and dry.   Psychiatric: She has a normal mood and affect. Her behavior is normal.   Nursing note and vitals reviewed.    Results Review:     I reviewed the patient's new clinical results.    Medical records reviewed  Summary:      Hospital " Outpatient Visit on 04/27/2017   Component Date Value Ref Range Status   • BH CV STRESS PROTOCOL 1 05/01/2017 [place]   Final   • Stage 1 05/01/2017 1   Final   • HR Stage 1 05/01/2017 151   Final   • BP Stage 1 05/01/2017 162/84   Final   • Duration Min Stage 1 05/01/2017 3   Final   • Duration Sec Stage 1 05/01/2017 0   Final   • Grade Stage 1 05/01/2017 10   Final   • Speed Stage 1 05/01/2017 1.7   Final   • BH CV STRESS METS STAGE 1 05/01/2017 5   Final   • Stage 2 05/01/2017 2   Final   • HR Stage 2 05/01/2017 164   Final   • BP Stage 2 05/01/2017 162/84   Final   • Duration Min Stage 2 05/01/2017 1   Final   • Duration Sec Stage 2 05/01/2017 30   Final   • Grade Stage 2 05/01/2017 12   Final   • Speed Stage 2 05/01/2017 2.5   Final   • BH CV STRESS METS STAGE 2 05/01/2017 7.5   Final   • Baseline HR 05/01/2017 94  bpm Final   • Baseline BP 05/01/2017 146/82  mmHg Final   • Peak HR 05/01/2017 164  bpm Final   • Percent Max Pred HR 05/01/2017 107.89  % Final   • Percent Target HR 05/01/2017 127  % Final   • Peak BP 05/01/2017 162/84  mmHg Final   • Recovery HR 05/01/2017 98  bpm Final   • Recovery BP 05/01/2017 154/78  mmHg Final   • Target HR (85%) 05/01/2017 129  bpm Final   • Max. Pred. HR (100%) 05/01/2017 152  bpm Final   • Exercise duration (min) 05/01/2017 4  min Final   • Exercise duration (sec) 05/01/2017 30  sec Final   • Estimated workload 05/01/2017 6.0  METS Final   • Nuclear Prior Study 05/01/2017 2   Final   • Nuc Stress EF 05/01/2017 72  % Final     Lab Results   Component Value Date    HGBA1C 6.66 (H) 12/29/2016    HGBA1C 6.34 (H) 08/29/2016    HGBA1C 6.20 (H) 04/25/2016     Lab Results   Component Value Date    MICROALBUR <3.0 12/29/2016    CREATININE 0.88 03/30/2017     Imaging Results (most recent)     None                Assessment and Plan:    Shirin was seen today for diabetes.    Diagnoses and all orders for this visit:    Uncontrolled type 2 diabetes mellitus with complication, with  long-term current use of insulin  -     Comprehensive Metabolic Panel  -     Hemoglobin A1c  -     Microalbumin / Creatinine Urine Ratio    Uncontrolled type 2 diabetes mellitus with background retinopathy  -     Comprehensive Metabolic Panel  -     Hemoglobin A1c  -     Microalbumin / Creatinine Urine Ratio    Hyperinsulinism  -     Comprehensive Metabolic Panel  -     Hemoglobin A1c  -     Microalbumin / Creatinine Urine Ratio    Osteoporosis  -     Comprehensive Metabolic Panel  -     Hemoglobin A1c  -     Microalbumin / Creatinine Urine Ratio    Encounter for long-term (current) use of insulin  -     Comprehensive Metabolic Panel  -     Hemoglobin A1c  -     Microalbumin / Creatinine Urine Ratio    Hyperlipidemia, unspecified hyperlipidemia type  -     Comprehensive Metabolic Panel  -     Hemoglobin A1c  -     Microalbumin / Creatinine Urine Ratio      Glucometer download reviewed  Patient's blood sugars are significantly higher.  There anywhere from 150-250  Patient reported that she is eating late and probably eating more carbs and her blood sugars are high year  She is currently using NovoLog 1 unit for every 8 carbohydrates  She also uses 1 unit for every 25/125    She started playing golf at least every other day  She has back problems  I recommend that she signed up to [facility] and start using the pool daily for exercises  Patient plans to continue weight watchers for a little longer but she's not sure    I advised the patient to change the NovoLog ratio to 1 unit for every 6 carbs  I increased the tresiba 26 units at bedtime  She'll continue to increase it by 2 units every 3 days until the fasting blood sugar is in the 100 to 1:30 range    Patient will get lab testing done today  She'll return to follow-up in 3-4 months.    The total time spent for old record and lab review and face- to- face was more than 25 min of which greater than 15 min of time was spent on counseling the patient on recommended  evaluation and treatment options, instructions for management/treatment and /or follow up  and importance of compliance with chosen management or treatment options     Frank Allen MD. FACE    07/07/17

## 2017-07-08 LAB
ALBUMIN SERPL-MCNC: 4.4 G/DL (ref 3.5–5.2)
ALBUMIN/CREAT UR: <10.5 MG/G CREAT (ref 0–30)
ALBUMIN/GLOB SERPL: 1.8 G/DL
ALP SERPL-CCNC: 36 U/L (ref 39–117)
ALT SERPL-CCNC: 25 U/L (ref 1–33)
AST SERPL-CCNC: 23 U/L (ref 1–32)
BILIRUB SERPL-MCNC: 0.6 MG/DL (ref 0.1–1.2)
BUN SERPL-MCNC: 30 MG/DL (ref 8–23)
BUN/CREAT SERPL: 31.9 (ref 7–25)
CALCIUM SERPL-MCNC: 10.7 MG/DL (ref 8.6–10.5)
CHLORIDE SERPL-SCNC: 101 MMOL/L (ref 98–107)
CO2 SERPL-SCNC: 24.1 MMOL/L (ref 22–29)
CREAT SERPL-MCNC: 0.94 MG/DL (ref 0.57–1)
CREAT UR-MCNC: 28.6 MG/DL
GLOBULIN SER CALC-MCNC: 2.5 GM/DL
GLUCOSE SERPL-MCNC: 108 MG/DL (ref 65–99)
HBA1C MFR BLD: 6.3 % (ref 4.8–5.6)
MICROALBUMIN UR-MCNC: <3 UG/ML
POTASSIUM SERPL-SCNC: 4.3 MMOL/L (ref 3.5–5.2)
PROT SERPL-MCNC: 6.9 G/DL (ref 6–8.5)
SODIUM SERPL-SCNC: 140 MMOL/L (ref 136–145)

## 2017-08-07 DIAGNOSIS — IMO0002 DIABETES MELLITUS TYPE 2, UNCONTROLLED, WITH COMPLICATIONS: ICD-10-CM

## 2017-09-12 ENCOUNTER — LAB (OUTPATIENT)
Dept: LAB | Facility: HOSPITAL | Age: 67
End: 2017-09-12

## 2017-09-12 DIAGNOSIS — D50.9 IRON DEFICIENCY ANEMIA, UNSPECIFIED IRON DEFICIENCY ANEMIA TYPE: ICD-10-CM

## 2017-09-12 LAB
ALBUMIN SERPL-MCNC: 4.1 G/DL (ref 3.5–5.2)
ALBUMIN/GLOB SERPL: 1.6 G/DL (ref 1.1–2.4)
ALP SERPL-CCNC: 37 U/L (ref 38–116)
ALT SERPL W P-5'-P-CCNC: 19 U/L (ref 0–33)
ANION GAP SERPL CALCULATED.3IONS-SCNC: 12 MMOL/L
AST SERPL-CCNC: 20 U/L (ref 0–32)
BASOPHILS # BLD AUTO: 0.05 10*3/MM3 (ref 0–0.1)
BASOPHILS NFR BLD AUTO: 0.5 % (ref 0–1.1)
BILIRUB SERPL-MCNC: 0.7 MG/DL (ref 0.1–1.2)
BUN BLD-MCNC: 23 MG/DL (ref 6–20)
BUN/CREAT SERPL: 20.9 (ref 7.3–30)
CALCIUM SPEC-SCNC: 9.5 MG/DL (ref 8.5–10.2)
CHLORIDE SERPL-SCNC: 103 MMOL/L (ref 98–107)
CO2 SERPL-SCNC: 26 MMOL/L (ref 22–29)
CREAT BLD-MCNC: 1.1 MG/DL (ref 0.6–1.1)
DEPRECATED RDW RBC AUTO: 47.1 FL (ref 37–49)
EOSINOPHIL # BLD AUTO: 0.85 10*3/MM3 (ref 0–0.36)
EOSINOPHIL NFR BLD AUTO: 8.8 % (ref 1–5)
ERYTHROCYTE [DISTWIDTH] IN BLOOD BY AUTOMATED COUNT: 15.1 % (ref 11.7–14.5)
FERRITIN SERPL-MCNC: 32.7 NG/ML
GFR SERPL CREATININE-BSD FRML MDRD: 49 ML/MIN/1.73
GLOBULIN UR ELPH-MCNC: 2.5 GM/DL (ref 1.8–3.5)
GLUCOSE BLD-MCNC: 90 MG/DL (ref 74–124)
HCT VFR BLD AUTO: 37.1 % (ref 34–45)
HGB BLD-MCNC: 12.1 G/DL (ref 11.5–14.9)
HGB RETIC QN: 32.9 PG (ref 29.8–36.1)
IMM GRANULOCYTES # BLD: 0.05 10*3/MM3 (ref 0–0.03)
IMM GRANULOCYTES NFR BLD: 0.5 % (ref 0–0.5)
IMM RETICS NFR: 13.2 % (ref 3–15.8)
IRON 24H UR-MRATE: 53 MCG/DL (ref 37–145)
IRON SATN MFR SERPL: 11 % (ref 14–48)
LYMPHOCYTES # BLD AUTO: 1.78 10*3/MM3 (ref 1–3.5)
LYMPHOCYTES NFR BLD AUTO: 18.4 % (ref 20–49)
MCH RBC QN AUTO: 27.8 PG (ref 27–33)
MCHC RBC AUTO-ENTMCNC: 32.6 G/DL (ref 32–35)
MCV RBC AUTO: 85.3 FL (ref 83–97)
MONOCYTES # BLD AUTO: 0.66 10*3/MM3 (ref 0.25–0.8)
MONOCYTES NFR BLD AUTO: 6.8 % (ref 4–12)
NEUTROPHILS # BLD AUTO: 6.27 10*3/MM3 (ref 1.5–7)
NEUTROPHILS NFR BLD AUTO: 65 % (ref 39–75)
NRBC BLD MANUAL-RTO: 0 /100 WBC (ref 0–0)
PLATELET # BLD AUTO: 251 10*3/MM3 (ref 150–375)
PMV BLD AUTO: 11.3 FL (ref 8.9–12.1)
POTASSIUM BLD-SCNC: 4.2 MMOL/L (ref 3.5–4.7)
PROT SERPL-MCNC: 6.6 G/DL (ref 6.3–8)
RBC # BLD AUTO: 4.35 10*6/MM3 (ref 3.9–5)
RETICS/RBC NFR AUTO: 1.77 % (ref 0.6–2)
SODIUM BLD-SCNC: 141 MMOL/L (ref 134–145)
TIBC SERPL-MCNC: 463 MCG/DL (ref 249–505)
TRANSFERRIN SERPL-MCNC: 331 MG/DL (ref 200–360)
VIT B12 BLD-MCNC: 324 PG/ML (ref 211–946)
WBC NRBC COR # BLD: 9.66 10*3/MM3 (ref 4–10)

## 2017-09-12 PROCEDURE — 84466 ASSAY OF TRANSFERRIN: CPT | Performed by: INTERNAL MEDICINE

## 2017-09-12 PROCEDURE — 82607 VITAMIN B-12: CPT | Performed by: INTERNAL MEDICINE

## 2017-09-12 PROCEDURE — 82728 ASSAY OF FERRITIN: CPT | Performed by: INTERNAL MEDICINE

## 2017-09-12 PROCEDURE — 36415 COLL VENOUS BLD VENIPUNCTURE: CPT | Performed by: INTERNAL MEDICINE

## 2017-09-12 PROCEDURE — 80053 COMPREHEN METABOLIC PANEL: CPT | Performed by: INTERNAL MEDICINE

## 2017-09-12 PROCEDURE — 85046 RETICYTE/HGB CONCENTRATE: CPT | Performed by: INTERNAL MEDICINE

## 2017-09-12 PROCEDURE — 83540 ASSAY OF IRON: CPT | Performed by: INTERNAL MEDICINE

## 2017-09-12 PROCEDURE — 85025 COMPLETE CBC W/AUTO DIFF WBC: CPT | Performed by: INTERNAL MEDICINE

## 2017-09-22 ENCOUNTER — OFFICE VISIT (OUTPATIENT)
Dept: ONCOLOGY | Facility: CLINIC | Age: 67
End: 2017-09-22

## 2017-09-22 ENCOUNTER — APPOINTMENT (OUTPATIENT)
Dept: LAB | Facility: HOSPITAL | Age: 67
End: 2017-09-22

## 2017-09-22 VITALS
SYSTOLIC BLOOD PRESSURE: 110 MMHG | DIASTOLIC BLOOD PRESSURE: 70 MMHG | TEMPERATURE: 98.3 F | HEIGHT: 59 IN | WEIGHT: 131.4 LBS | BODY MASS INDEX: 26.49 KG/M2 | RESPIRATION RATE: 16 BRPM | HEART RATE: 82 BPM

## 2017-09-22 DIAGNOSIS — D50.9 IRON DEFICIENCY ANEMIA, UNSPECIFIED IRON DEFICIENCY ANEMIA TYPE: Primary | ICD-10-CM

## 2017-09-22 PROBLEM — T45.4X5A IRON AND ITS COMPOUNDS CAUSING ADVERSE EFFECT IN THERAPEUTIC USE: Status: ACTIVE | Noted: 2017-09-22

## 2017-09-22 PROCEDURE — 99213 OFFICE O/P EST LOW 20 MIN: CPT | Performed by: INTERNAL MEDICINE

## 2017-09-22 PROCEDURE — G0463 HOSPITAL OUTPT CLINIC VISIT: HCPCS | Performed by: INTERNAL MEDICINE

## 2017-09-22 RX ORDER — SODIUM CHLORIDE 9 MG/ML
250 INJECTION, SOLUTION INTRAVENOUS ONCE
Status: CANCELLED | OUTPATIENT
Start: 2017-10-03

## 2017-09-22 RX ORDER — CYANOCOBALAMIN 1000 UG/ML
1000 INJECTION, SOLUTION INTRAMUSCULAR; SUBCUTANEOUS ONCE
Status: CANCELLED
Start: 2017-10-03 | End: 2017-10-03

## 2017-09-22 RX ORDER — DIPHENHYDRAMINE HCL 25 MG
25 CAPSULE ORAL ONCE
Status: CANCELLED | OUTPATIENT
Start: 2017-09-26

## 2017-09-22 RX ORDER — DIPHENHYDRAMINE HCL 25 MG
25 CAPSULE ORAL ONCE
Status: CANCELLED | OUTPATIENT
Start: 2017-10-03

## 2017-09-22 RX ORDER — CYANOCOBALAMIN 1000 UG/ML
1000 INJECTION, SOLUTION INTRAMUSCULAR; SUBCUTANEOUS ONCE
Status: CANCELLED
Start: 2017-09-26 | End: 2017-09-26

## 2017-09-22 RX ORDER — SODIUM CHLORIDE 9 MG/ML
250 INJECTION, SOLUTION INTRAVENOUS ONCE
Status: CANCELLED | OUTPATIENT
Start: 2017-09-26

## 2017-09-22 RX ORDER — FAMOTIDINE 10 MG/ML
20 INJECTION, SOLUTION INTRAVENOUS ONCE
Status: CANCELLED | OUTPATIENT
Start: 2017-09-26

## 2017-09-22 NOTE — PROGRESS NOTES
"[facility] OUTPATIENT FOLLOW UP CLINIC VISIT    REASON FOR FOLLOW-UP:    1.  Iron deficiency anemia.  Her last intravenous Feraheme infusions or in October 2015.    HISTORY OF PRESENT ILLNESS:  Shirin Washington is a 67 y.o. female who returns today for follow up of the above issue.    She is a little bit more fatigued.  She is managing to play golf one or 2 days per week.  She does notice more GI upset with nausea after she eats certain foods.  Coffee is even making her nauseous at this point.  She continues to have some issues with vertigo.      PAST MEDICAL, SURGICAL, FAMILY, AND SOCIAL HISTORIES were reviewed with the patient and in the electronic medical record, and were updated if indicated.    ALLERGIES:  Allergies   Allergen Reactions   • Topamax [Topiramate]        MEDICATIONS:  The medication list has been reviewed with the patient by the medical assistant, and the list has been updated in the electronic medical record, which I reviewed.  Medication dosages and frequencies were confirmed to be accurate.    REVIEW OF SYSTEMS:  PAIN:  See Vital Signs below.  GENERAL:  No fevers, chills, night sweats, or unintended weight loss.  Fatigue.  See history of present illness.  SKIN:  No rashes or non-healing lesions.  HEME/LYMPH:  No abnormal bleeding.  No palpable lymphadenopathy.  EYES:  No vision changes or diplopia.  ENT:  Vertigo as noted in the history of present illness  RESPIRATORY:  No cough, shortness of breath, hemoptysis, or wheezing.  CARDIOVASCULAR:  No chest pain or shortness of breath  GASTROINTESTINAL:  See history of present illness  GENITOURINARY:  No dysuria or hematuria.  MUSCULOSKELETAL:  No joint pain, swelling, or erythema.  NEUROLOGIC:  No dizziness, loss of consciousness, or seizures.  PSYCHIATRIC:  No depression, anxiety, or mood changes.    Vitals:    09/22/17 0925   BP: 110/70   Pulse: 82   Resp: 16   Temp: 98.3 °F (36.8 °C)   Weight: 131 lb 6.4 oz (59.6 kg)   Height: 59.45\" " (151 cm)  Comment: new ht.   PainSc: 0-No pain       PHYSICAL EXAMINATION:  GENERAL:  Well-developed well-nourished female; awake, alert and oriented, in no acute distress.  SKIN:  Warm and dry, without rashes, purpura, or petechiae.  HEAD:  Normocephalic, atraumatic.  EYES:  Pupils equal, round and reactive to light.  Extraocular movements intact.  Conjunctivae normal.  EARS:  Hearing intact.  NOSE:  Septum midline.  No excoriations or nasal discharge.  MOUTH:  No stomatitis or ulcers.  Lips are normal.  THROAT:  Oropharynx without lesions or exudates.  NECK:  Supple with good range of motion; no thyromegaly or masses; no JVD or bruits.  LYMPHATICS:  No cervical, supraclavicular, axillary, or inguinal lymphadenopathy.  CHEST:  Lungs are clear to auscultation bilaterally.  No wheezes, rales, or rhonchi.  HEART:  Regular rate; normal rhythm.  No murmurs, gallops or rubs.  ABDOMEN:  Soft.  Mild tenderness to palpation without rebound or guarding.  EXTREMITIES:  No clubbing, cyanosis, or edema.  NEUROLOGICAL:  No focal neurologic deficits.    DIAGNOSTIC DATA:  Lab Results   Component Value Date    WBC 9.66 09/12/2017    HGB 12.1 09/12/2017    HCT 37.1 09/12/2017    MCV 85.3 09/12/2017     09/12/2017     Vitamin B12 324  Ferritin 32, down from 108  Complete metabolic panel normal    ASSESSMENT:  This is a 67 y.o. female with:  1.  History of iron deficiency anemia: She required intravenous Feraheme last in October 2015.  Her ferritin is slowly dropping.  She is more fatigued.  I think we can make her feel better by giving her 2 more doses of Feraheme.  No obvious GI blood loss.  I suspect she has some malabsorption.  She has symptoms consistent with gastroparesis.  She also received vitamin B12 injections in the past.  We will arrange 2 more doses of Feraheme with 2 vitamin B12 injections.  She will also start a sublingual vitamin B12 supplement.    PLAN:  1.  We will schedule 2 doses of intravenous Feraheme with  vitamin B12 injections on those days as well.  2.  Otherwise I will see her back in 6 months for follow-up with labs done one week prior.

## 2017-09-26 ENCOUNTER — INFUSION (OUTPATIENT)
Dept: ONCOLOGY | Facility: HOSPITAL | Age: 67
End: 2017-09-26

## 2017-09-26 VITALS
SYSTOLIC BLOOD PRESSURE: 118 MMHG | BODY MASS INDEX: 26.22 KG/M2 | DIASTOLIC BLOOD PRESSURE: 66 MMHG | TEMPERATURE: 98.4 F | HEART RATE: 73 BPM | WEIGHT: 131.8 LBS

## 2017-09-26 DIAGNOSIS — D50.9 IRON DEFICIENCY ANEMIA, UNSPECIFIED IRON DEFICIENCY ANEMIA TYPE: ICD-10-CM

## 2017-09-26 DIAGNOSIS — E53.8 B12 DEFICIENCY: ICD-10-CM

## 2017-09-26 DIAGNOSIS — IMO0001 IRON AND ITS COMPOUNDS CAUSING ADVERSE EFFECT IN THERAPEUTIC USE, SUBSEQUENT ENCOUNTER: Primary | ICD-10-CM

## 2017-09-26 PROCEDURE — 25010000002 CYANOCOBALAMIN PER 1000 MCG: Performed by: INTERNAL MEDICINE

## 2017-09-26 PROCEDURE — 96372 THER/PROPH/DIAG INJ SC/IM: CPT

## 2017-09-26 PROCEDURE — 63710000001 DIPHENHYDRAMINE PER 50 MG: Performed by: INTERNAL MEDICINE

## 2017-09-26 PROCEDURE — 96375 TX/PRO/DX INJ NEW DRUG ADDON: CPT

## 2017-09-26 PROCEDURE — 25010000002 FERUMOXYTOL 510 MG/17ML SOLUTION 510 MG VIAL: Performed by: INTERNAL MEDICINE

## 2017-09-26 PROCEDURE — 96374 THER/PROPH/DIAG INJ IV PUSH: CPT

## 2017-09-26 RX ORDER — SODIUM CHLORIDE 9 MG/ML
250 INJECTION, SOLUTION INTRAVENOUS ONCE
Status: COMPLETED | OUTPATIENT
Start: 2017-09-26 | End: 2017-09-26

## 2017-09-26 RX ORDER — FAMOTIDINE 10 MG/ML
20 INJECTION, SOLUTION INTRAVENOUS ONCE
Status: COMPLETED | OUTPATIENT
Start: 2017-09-26 | End: 2017-09-26

## 2017-09-26 RX ORDER — CYANOCOBALAMIN 1000 UG/ML
1000 INJECTION, SOLUTION INTRAMUSCULAR; SUBCUTANEOUS ONCE
Status: COMPLETED | OUTPATIENT
Start: 2017-09-26 | End: 2017-09-26

## 2017-09-26 RX ORDER — DIPHENHYDRAMINE HCL 25 MG
25 CAPSULE ORAL ONCE
Status: COMPLETED | OUTPATIENT
Start: 2017-09-26 | End: 2017-09-26

## 2017-09-26 RX ADMIN — FERUMOXYTOL 510 MG: 510 INJECTION INTRAVENOUS at 10:28

## 2017-09-26 RX ADMIN — SODIUM CHLORIDE 250 ML: 900 INJECTION, SOLUTION INTRAVENOUS at 10:10

## 2017-09-26 RX ADMIN — DIPHENHYDRAMINE HYDROCHLORIDE 25 MG: 25 CAPSULE ORAL at 10:11

## 2017-09-26 RX ADMIN — FAMOTIDINE 20 MG: 10 INJECTION, SOLUTION INTRAVENOUS at 10:11

## 2017-09-26 RX ADMIN — CYANOCOBALAMIN 1000 MCG: 1000 INJECTION, SOLUTION INTRAMUSCULAR; SUBCUTANEOUS at 10:27

## 2017-09-27 PROBLEM — E53.8 B12 DEFICIENCY: Status: ACTIVE | Noted: 2017-09-27

## 2017-10-03 ENCOUNTER — INFUSION (OUTPATIENT)
Dept: ONCOLOGY | Facility: HOSPITAL | Age: 67
End: 2017-10-03

## 2017-10-03 VITALS
TEMPERATURE: 98 F | HEART RATE: 69 BPM | BODY MASS INDEX: 26.34 KG/M2 | SYSTOLIC BLOOD PRESSURE: 122 MMHG | DIASTOLIC BLOOD PRESSURE: 81 MMHG | WEIGHT: 132.4 LBS

## 2017-10-03 DIAGNOSIS — D50.9 IRON DEFICIENCY ANEMIA, UNSPECIFIED IRON DEFICIENCY ANEMIA TYPE: ICD-10-CM

## 2017-10-03 DIAGNOSIS — IMO0001 IRON AND ITS COMPOUNDS CAUSING ADVERSE EFFECT IN THERAPEUTIC USE, SUBSEQUENT ENCOUNTER: ICD-10-CM

## 2017-10-03 DIAGNOSIS — E53.8 B12 DEFICIENCY: Primary | ICD-10-CM

## 2017-10-03 PROCEDURE — 25010000002 FERUMOXYTOL 510 MG/17ML SOLUTION 510 MG VIAL: Performed by: INTERNAL MEDICINE

## 2017-10-03 PROCEDURE — 25010000002 CYANOCOBALAMIN PER 1000 MCG: Performed by: INTERNAL MEDICINE

## 2017-10-03 PROCEDURE — 96372 THER/PROPH/DIAG INJ SC/IM: CPT

## 2017-10-03 PROCEDURE — 96374 THER/PROPH/DIAG INJ IV PUSH: CPT

## 2017-10-03 PROCEDURE — 63710000001 DIPHENHYDRAMINE PER 50 MG: Performed by: INTERNAL MEDICINE

## 2017-10-03 RX ORDER — DIPHENHYDRAMINE HCL 25 MG
25 CAPSULE ORAL ONCE
Status: COMPLETED | OUTPATIENT
Start: 2017-10-03 | End: 2017-10-03

## 2017-10-03 RX ORDER — SODIUM CHLORIDE 9 MG/ML
250 INJECTION, SOLUTION INTRAVENOUS ONCE
Status: COMPLETED | OUTPATIENT
Start: 2017-10-03 | End: 2017-10-03

## 2017-10-03 RX ORDER — CYANOCOBALAMIN 1000 UG/ML
1000 INJECTION, SOLUTION INTRAMUSCULAR; SUBCUTANEOUS ONCE
Status: COMPLETED | OUTPATIENT
Start: 2017-10-03 | End: 2017-10-03

## 2017-10-03 RX ADMIN — SODIUM CHLORIDE 250 ML: 900 INJECTION, SOLUTION INTRAVENOUS at 10:38

## 2017-10-03 RX ADMIN — DIPHENHYDRAMINE HYDROCHLORIDE 25 MG: 25 CAPSULE ORAL at 10:39

## 2017-10-03 RX ADMIN — CYANOCOBALAMIN 1000 MCG: 1000 INJECTION, SOLUTION INTRAMUSCULAR; SUBCUTANEOUS at 10:39

## 2017-10-03 RX ADMIN — FERUMOXYTOL 510 MG: 510 INJECTION INTRAVENOUS at 11:14

## 2017-11-09 ENCOUNTER — OFFICE VISIT (OUTPATIENT)
Dept: ENDOCRINOLOGY | Age: 67
End: 2017-11-09

## 2017-11-09 VITALS
DIASTOLIC BLOOD PRESSURE: 62 MMHG | SYSTOLIC BLOOD PRESSURE: 130 MMHG | WEIGHT: 132 LBS | OXYGEN SATURATION: 98 % | HEART RATE: 76 BPM | HEIGHT: 60 IN | BODY MASS INDEX: 25.91 KG/M2

## 2017-11-09 DIAGNOSIS — E16.1 HYPERINSULINISM: ICD-10-CM

## 2017-11-09 DIAGNOSIS — Z79.4 ENCOUNTER FOR LONG-TERM (CURRENT) USE OF INSULIN (HCC): ICD-10-CM

## 2017-11-09 DIAGNOSIS — IMO0002 UNCONTROLLED TYPE 2 DIABETES MELLITUS WITH BACKGROUND RETINOPATHY: ICD-10-CM

## 2017-11-09 DIAGNOSIS — M81.0 POSTMENOPAUSAL OSTEOPOROSIS: ICD-10-CM

## 2017-11-09 DIAGNOSIS — IMO0002 UNCONTROLLED TYPE 2 DIABETES MELLITUS WITH COMPLICATION, WITH LONG-TERM CURRENT USE OF INSULIN: Primary | ICD-10-CM

## 2017-11-09 DIAGNOSIS — E78.5 HYPERLIPIDEMIA, UNSPECIFIED HYPERLIPIDEMIA TYPE: ICD-10-CM

## 2017-11-09 PROCEDURE — 99214 OFFICE O/P EST MOD 30 MIN: CPT | Performed by: INTERNAL MEDICINE

## 2017-11-09 NOTE — PROGRESS NOTES
67 y.o.    Patient Care Team:  Duncan Domingo MD as PCP - General (Internal Medicine)  Axel Lobato MD as Consulting Physician (Hematology and Oncology)  Chika Ospina MD as Referring Physician (General Surgery)    Chief Complaint:    F/U TYPE 2 DIABETES MELLITUS  Subjective     HPI   Patient is a 67-year-old white female with a past history of uncontrolled type 2 diabetes mellitus came for follow-up  She's a former patient of Dr. Tam    Uncontrolled type 2 diabetes mellitus  Patient is currently taking tresiba 24 units daily along with NovoLog 1 unit for every 10 carbohydrates and one unit for every 25/125 blood sugar  Patient's blood sugars are ranging from  for the most part  Hypoglycemia  Patient has not had any serious hypoglycemia other than crit sugars dropping into 70-80 range and patient becomes mildly symptomatic  Patient also reported that she just finished 2 infusions of iron in September and October 2017  Uncontrolled type 2 diabetes mellitus with neuropathy  Patient is diagnosed with gastroparesis  Uncontrolled type 2 diabetes mellitus with background diabetic retinopathy  Patient has regular eye appointments  Patient denies any knowledge of diabetic  nephropathy.    Postmenopausal osteoporosis  Patient is currently on prolia since May 2017 no side effects reported  Patient reported that she still following the Weight Watchers diet for the most part    Interval History    Summary  Uncontrolled type 2 diabetes mellitus  Patient was diagnosed approximately 18 years ago. She reported that she's taking Levemir 15 units at night and takes NovoLog one unit for every 10 carbohydrates and a correction scale of one unit for every 25 mg over 125.  Patient reported that most of the blood sugars are in the 100-200 range. Occasionally she has a blood sugar 75-90. She keeps adjusting her insulin based on the blood sugars      Uncontrolled type 2 diabetes mellitus with retinopathy.  Patient reports to  have had background diabetic retinopathy in the right eye which apparently stable for the past several years.  Patient denied any knowledge of symptoms of diabetic neuropathy or nephropathy.  Patient also denied any coronary artery disease or TIA or stroke.  Patient is physically very active, she plays golf almost daily.      Hypoglycemia  Patient periodically gets iron infusions for anemia and has had episodes of hypoglycemia.  Hyperlipidemia  Patient is currently on Lipitor 80 mg as well as TriCor 145 mg daily. She denies any side effects on the medication.  Hypertension  Patient is currently on Norvasc 2.5 mg daily as well as Tenormin 25 mg daily, Maxzide one tablet daily. Her blood pressure is stable currently.  Patient denied any side effects on the medication  The following portions of the patient's history were reviewed and updated as appropriate: allergies, current medications, past family history, past medical history, past social history, past surgical history and problem list.    Past Medical History:   Diagnosis Date   • Anemia    • Arthritis    • Back pain    • Background diabetic retinopathy of right eye determined by examination    • Coronary atherosclerosis     cath 1/2014 with 50% mid LAD, otherwise unremarkable   • Diabetes mellitus type 2, insulin dependent    • Gastroparesis due to secondary diabetes    • GERD (gastroesophageal reflux disease)    • History of spinal stenosis    • History of spondylolisthesis    • History of URI (upper respiratory infection)    • Hypercholesteremia    • Hyperlipidemia    • Hypertension    • Iron deficiency anemia    • Left-sided weakness     ARM AND LEG   • Leukocytosis     MILD   • Osteopenia    • Osteoporosis    • Peripheral neuropathy    • Retinopathy    • Vertigo      Family History   Problem Relation Age of Onset   • Polycythemia Mother      Progressed to secondary myelofibrosis   • Cirrhosis Father    • Diabetes Other    • Hypertension Other    • Cancer Neg  Hx      Social History     Social History   • Marital status:      Spouse name: Yehuda   • Number of children: N/A   • Years of education: N/A     Occupational History   • 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[place]   •  Retired     Social History Main Topics   • Smoking status: Never Smoker   • Smokeless tobacco: Never Used   • Alcohol use Yes      Comment: OCCASIONAL   • Drug use: No   • Sexual activity: Defer     Other Topics Concern   • Not on file     Social History Narrative     Allergies   Allergen Reactions   • Topamax [Topiramate]        Current Outpatient Prescriptions:   •  ALPRAZolam (XANAX) 0.25 MG tablet, 2 (Two) Times a Day., Disp: , Rfl:   •  amLODIPine (NORVASC) 2.5 MG tablet, Take  by mouth Daily., Disp: , Rfl:   •  aspirin 81 MG EC tablet, Take  by mouth Daily., Disp: , Rfl:   •  atenolol (TENORMIN) 50 MG tablet, Take 1 tablet by mouth Daily., Disp: 30 tablet, Rfl: 6  •  atorvastatin (LIPITOR) 80 MG tablet, Take  by mouth Daily., Disp: , Rfl:   •  denosumab (PROLIA) 60 MG/ML solution syringe, Inject 60 mg under the skin Every 6 (Six) Months., Disp: , Rfl:   •  fenofibrate (TRICOR) 145 MG tablet, Take  by mouth Daily., Disp: , Rfl:   •  insulin aspart (novoLOG FLEXPEN) 100 UNIT/ML solution pen-injector sc pen, Inject 40 Units under the skin 3 (Three) Times a Day With Meals. (Patient taking differently: Inject 10 Units under the skin 3 (Three) Times a Day With Meals.), Disp: 15 pen, Rfl: 3  •  Insulin Degludec (TRESIBA FLEXTOUCH) 200 UNIT/ML solution pen-injector, Inject 30 Units under the skin Daily., Disp: 9 pen, Rfl: 3  •  Insulin Pen Needle (BD PEN NEEDLE SOCO U/F) 32G X 4 MM misc, 4 times daily, Disp: 200 each, Rfl: 3  •  meloxicam (MOBIC) 15 MG tablet, Take 15 mg by mouth daily., Disp: , Rfl:   •  ONE TOUCH ULTRA TEST test strip, USE TO TEST BLOOD SUGAR 8 TIMES DAILY, Disp: 700 each, Rfl: 1  •  ONETOUCH DELICA LANCETS 33G misc, , Disp: , Rfl:   •   "triamterene-hydrochlorothiazide (MAXZIDE-25) 37.5-25 MG per tablet, Take  by mouth. TAKES ONE HALF TABLET DAILY, Disp: , Rfl:   •  zolpidem (AMBIEN) 10 MG tablet, Take  by mouth., Disp: , Rfl:         Review of Systems   Constitutional: Positive for fatigue.   Respiratory: Negative for shortness of breath and wheezing.    Cardiovascular: Negative for chest pain, palpitations and leg swelling.   Endocrine: Positive for polyphagia. Negative for cold intolerance, heat intolerance, polydipsia and polyuria.   Genitourinary: Negative for frequency.   Skin: Negative for pallor.   Neurological: Positive for dizziness and weakness. Negative for tremors, seizures, speech difficulty and headaches.   Psychiatric/Behavioral: Negative for confusion. The patient is not nervous/anxious.    All other systems reviewed and are negative.      Objective       Vitals:    11/09/17 1159   BP: 130/62   Pulse: 76   SpO2: 98%   Weight: 132 lb (59.9 kg)   Height: 60\" (152.4 cm)     Body mass index is 25.78 kg/(m^2).      Physical Exam   Constitutional: She is oriented to person, place, and time. She appears well-developed and well-nourished.   HENT:   Head: Normocephalic and atraumatic.   Eyes: EOM are normal. Pupils are equal, round, and reactive to light.   Neck: Normal range of motion. No thyromegaly present.   Cardiovascular: Normal rate, regular rhythm, normal heart sounds and intact distal pulses.    Pulmonary/Chest: Effort normal and breath sounds normal.   Abdominal: Soft. Bowel sounds are normal. She exhibits no distension. There is no tenderness.   Musculoskeletal: Normal range of motion. She exhibits no edema.   Neurological: She is alert and oriented to person, place, and time. She has normal reflexes.   Skin: Skin is warm and dry.   Psychiatric: She has a normal mood and affect. Her behavior is normal.   Nursing note and vitals reviewed.    Results Review:     I reviewed the patient's new clinical results.    Medical records " reviewed  Summary:      Lab on 09/12/2017   Component Date Value Ref Range Status   • Glucose 09/12/2017 90  74 - 124 mg/dL Final   • BUN 09/12/2017 23* 6 - 20 mg/dL Final   • Creatinine 09/12/2017 1.10  0.60 - 1.10 mg/dL Final   • Sodium 09/12/2017 141  134 - 145 mmol/L Final   • Potassium 09/12/2017 4.2  3.5 - 4.7 mmol/L Final   • Chloride 09/12/2017 103  98 - 107 mmol/L Final   • CO2 09/12/2017 26.0  22.0 - 29.0 mmol/L Final   • Calcium 09/12/2017 9.5  8.5 - 10.2 mg/dL Final   • Total Protein 09/12/2017 6.6  6.3 - 8.0 g/dL Final   • Albumin 09/12/2017 4.10  3.50 - 5.20 g/dL Final   • ALT (SGPT) 09/12/2017 19  0 - 33 U/L Final   • AST (SGOT) 09/12/2017 20  0 - 32 U/L Final   • Alkaline Phosphatase 09/12/2017 37* 38 - 116 U/L Final   • Total Bilirubin 09/12/2017 0.7  0.1 - 1.2 mg/dL Final   • eGFR Non African Amer 09/12/2017 49* >60 mL/min/1.73 Final   • Globulin 09/12/2017 2.5  1.8 - 3.5 gm/dL Final   • A/G Ratio 09/12/2017 1.6  1.1 - 2.4 g/dL Final   • BUN/Creatinine Ratio 09/12/2017 20.9  7.3 - 30.0 Final   • Anion Gap 09/12/2017 12.0  mmol/L Final   • Ferritin 09/12/2017 32.70  ng/mL Final   • Iron 09/12/2017 53  37 - 145 mcg/dL Final   • Iron Saturation 09/12/2017 11* 14 - 48 % Final   • Transferrin 09/12/2017 331  200 - 360 mg/dL Final   • TIBC 09/12/2017 463  249 - 505 mcg/dL Final   • Immature Reticulocyte Fraction 09/12/2017 13.2  3.0 - 15.8 % Final   • Reticulocyte % 09/12/2017 1.77  0.60 - 2.00 % Final   • Reticulocyte Hgb 09/12/2017 32.9  29.8 - 36.1 pg Final   • Vitamin B-12 09/12/2017 324  211 - 946 pg/mL Final   • WBC 09/12/2017 9.66  4.00 - 10.00 10*3/mm3 Final   • RBC 09/12/2017 4.35  3.90 - 5.00 10*6/mm3 Final   • Hemoglobin 09/12/2017 12.1  11.5 - 14.9 g/dL Final   • Hematocrit 09/12/2017 37.1  34.0 - 45.0 % Final   • MCV 09/12/2017 85.3  83.0 - 97.0 fL Final   • MCH 09/12/2017 27.8  27.0 - 33.0 pg Final   • MCHC 09/12/2017 32.6  32.0 - 35.0 g/dL Final   • RDW 09/12/2017 15.1* 11.7 - 14.5 % Final    • RDW-SD 09/12/2017 47.1  37.0 - 49.0 fl Final   • MPV 09/12/2017 11.3  8.9 - 12.1 fL Final   • Platelets 09/12/2017 251  150 - 375 10*3/mm3 Final   • Neutrophil % 09/12/2017 65.0  39.0 - 75.0 % Final   • Lymphocyte % 09/12/2017 18.4* 20.0 - 49.0 % Final   • Monocyte % 09/12/2017 6.8  4.0 - 12.0 % Final   • Eosinophil % 09/12/2017 8.8* 1.0 - 5.0 % Final   • Basophil % 09/12/2017 0.5  0.0 - 1.1 % Final   • Immature Grans % 09/12/2017 0.5  0.0 - 0.5 % Final   • Neutrophils, Absolute 09/12/2017 6.27  1.50 - 7.00 10*3/mm3 Final   • Lymphocytes, Absolute 09/12/2017 1.78  1.00 - 3.50 10*3/mm3 Final   • Monocytes, Absolute 09/12/2017 0.66  0.25 - 0.80 10*3/mm3 Final   • Eosinophils, Absolute 09/12/2017 0.85* 0.00 - 0.36 10*3/mm3 Final   • Basophils, Absolute 09/12/2017 0.05  0.00 - 0.10 10*3/mm3 Final   • Immature Grans, Absolute 09/12/2017 0.05* 0.00 - 0.03 10*3/mm3 Final   • nRBC 09/12/2017 0.0  0.0 - 0.0 /100 WBC Final     Lab Results   Component Value Date    HGBA1C 6.30 (H) 07/07/2017    HGBA1C 6.66 (H) 12/29/2016    HGBA1C 6.34 (H) 08/29/2016     Lab Results   Component Value Date    MICROALBUR <3.0 07/07/2017    CREATININE 1.10 09/12/2017     Imaging Results (most recent)     None                Assessment and Plan:    Shirin was seen today for diabetes.    Diagnoses and all orders for this visit:    Uncontrolled type 2 diabetes mellitus with complication, with long-term current use of insulin    Uncontrolled type 2 diabetes mellitus with background retinopathy    Hyperinsulinism    Encounter for long-term (current) use of insulin    Hyperlipidemia, unspecified hyperlipidemia type    Postmenopausal osteoporosis    Glucometer download reviewed  Most of the blood sugars are ranging from  range  Majority are in the 100-150 range  Patient's last hemoglobin A1c was 6.3%    I advised the patient to continue exercise and diet control  I advised to change the NovoLog ratio to 1 unit for every 6 carbs in the  "past  Patient has not done so  She continued to increase the tresiba to 26 units but then back to 24 units recently    Patient will get lab testing done next visit  She will return to follow-up in 3-4 months.     The total time spent for old record and lab review and face- to- face was more than 25 min of which greater than 15 min of time was spent on counseling the patient on recommended evaluation and treatment options, instructions for management/treatment and /or follow up  and importance of compliance with chosen management or treatment options   Frank Allen MD. FACE    11/09/17      EMR Dragon / transcription disclaimer:     \"Dictated utilizing Dragon dictation\".         "

## 2017-11-20 ENCOUNTER — HOSPITAL ENCOUNTER (OUTPATIENT)
Dept: INFUSION THERAPY | Facility: HOSPITAL | Age: 67
Discharge: HOME | End: 2017-11-20
Admitting: INTERNAL MEDICINE

## 2017-11-20 VITALS
OXYGEN SATURATION: 97 % | RESPIRATION RATE: 16 BRPM | HEIGHT: 60 IN | HEART RATE: 67 BPM | BODY MASS INDEX: 25.72 KG/M2 | WEIGHT: 131 LBS | TEMPERATURE: 98 F | SYSTOLIC BLOOD PRESSURE: 131 MMHG | DIASTOLIC BLOOD PRESSURE: 64 MMHG

## 2017-11-20 DIAGNOSIS — M81.0 OSTEOPOROSIS, UNSPECIFIED OSTEOPOROSIS TYPE, UNSPECIFIED PATHOLOGICAL FRACTURE PRESENCE: ICD-10-CM

## 2017-11-20 LAB
ANION GAP SERPL CALCULATED.3IONS-SCNC: 11.3 MMOL/L
BUN BLD-MCNC: 35 MG/DL (ref 8–23)
BUN/CREAT SERPL: 35.4 (ref 7–25)
CALCIUM SPEC-SCNC: 10.3 MG/DL (ref 8.6–10.5)
CHLORIDE SERPL-SCNC: 102 MMOL/L (ref 98–107)
CO2 SERPL-SCNC: 24.7 MMOL/L (ref 22–29)
CREAT BLD-MCNC: 0.99 MG/DL (ref 0.57–1)
GFR SERPL CREATININE-BSD FRML MDRD: 56 ML/MIN/1.73
GLUCOSE BLD-MCNC: 133 MG/DL (ref 65–99)
POTASSIUM BLD-SCNC: 4.8 MMOL/L (ref 3.5–5.2)
SODIUM BLD-SCNC: 138 MMOL/L (ref 136–145)

## 2017-11-20 PROCEDURE — 25010000002 DENOSUMAB 60 MG/ML SOLUTION: Performed by: INTERNAL MEDICINE

## 2017-11-20 PROCEDURE — 80048 BASIC METABOLIC PNL TOTAL CA: CPT | Performed by: INTERNAL MEDICINE

## 2017-11-20 PROCEDURE — 36415 COLL VENOUS BLD VENIPUNCTURE: CPT

## 2017-11-20 PROCEDURE — 96372 THER/PROPH/DIAG INJ SC/IM: CPT

## 2017-11-20 RX ADMIN — DENOSUMAB 60 MG: 60 INJECTION SUBCUTANEOUS at 10:31

## 2017-11-20 NOTE — PATIENT INSTRUCTIONS
Denosumab injection  What is this medicine?  DENOSUMAB (den oh lisbeth mab) slows bone breakdown. Prolia is used to treat osteoporosis in women after menopause and in men. Xgeva is used to prevent bone fractures and other bone problems caused by cancer bone metastases. Xgeva is also used to treat giant cell tumor of the bone.  This medicine may be used for other purposes; ask your health care provider or pharmacist if you have questions.  COMMON BRAND NAME(S): Prolia, XGEVA  What should I tell my health care provider before I take this medicine?  They need to know if you have any of these conditions:  -dental disease  -eczema  -infection or history of infections  -kidney disease or on dialysis  -low blood calcium or vitamin D  -malabsorption syndrome  -scheduled to have surgery or tooth extraction  -taking medicine that contains denosumab  -thyroid or parathyroid disease  -an unusual reaction to denosumab, other medicines, foods, dyes, or preservatives  -pregnant or trying to get pregnant  -breast-feeding  How should I use this medicine?  This medicine is for injection under the skin. It is given by a health care professional in a hospital or clinic setting.  If you are getting Prolia, a special MedGuide will be given to you by the pharmacist with each prescription and refill. Be sure to read this information carefully each time.  For Prolia, talk to your pediatrician regarding the use of this medicine in children. Special care may be needed. For Xgeva, talk to your pediatrician regarding the use of this medicine in children. While this drug may be prescribed for children as young as 13 years for selected conditions, precautions do apply.  Overdosage: If you think you have taken too much of this medicine contact a poison control center or emergency room at once.  NOTE: This medicine is only for you. Do not share this medicine with others.  What if I miss a dose?  It is important not to miss your dose. Call your doctor  or health care professional if you are unable to keep an appointment.  What may interact with this medicine?  Do not take this medicine with any of the following medications:  -other medicines containing denosumab  This medicine may also interact with the following medications:  -medicines that suppress the immune system  -medicines that treat cancer  -steroid medicines like prednisone or cortisone  This list may not describe all possible interactions. Give your health care provider a list of all the medicines, herbs, non-prescription drugs, or dietary supplements you use. Also tell them if you smoke, drink alcohol, or use illegal drugs. Some items may interact with your medicine.  What should I watch for while using this medicine?  Visit your doctor or health care professional for regular checks on your progress. Your doctor or health care professional may order blood tests and other tests to see how you are doing.  Call your doctor or health care professional if you get a cold or other infection while receiving this medicine. Do not treat yourself. This medicine may decrease your body's ability to fight infection.  You should make sure you get enough calcium and vitamin D while you are taking this medicine, unless your doctor tells you not to. Discuss the foods you eat and the vitamins you take with your health care professional.  See your dentist regularly. Brush and floss your teeth as directed. Before you have any dental work done, tell your dentist you are receiving this medicine.  Do not become pregnant while taking this medicine or for 5 months after stopping it. Women should inform their doctor if they wish to become pregnant or think they might be pregnant. There is a potential for serious side effects to an unborn child. Talk to your health care professional or pharmacist for more information.  What side effects may I notice from receiving this medicine?  Side effects that you should report to your doctor  or health care professional as soon as possible:  -allergic reactions like skin rash, itching or hives, swelling of the face, lips, or tongue  -breathing problems  -chest pain  -fast, irregular heartbeat  -feeling faint or lightheaded, falls  -fever, chills, or any other sign of infection  -muscle spasms, tightening, or twitches  -numbness or tingling  -skin blisters or bumps, or is dry, peels, or red  -slow healing or unexplained pain in the mouth or jaw  -unusual bleeding or bruising  Side effects that usually do not require medical attention (report to your doctor or health care professional if they continue or are bothersome):  -muscle pain  -stomach upset, gas  This list may not describe all possible side effects. Call your doctor for medical advice about side effects. You may report side effects to FDA at 7-275-FDA-0163.  Where should I keep my medicine?  This medicine is only given in a clinic, doctor's office, or other health care setting and will not be stored at home.  NOTE: This sheet is a summary. It may not cover all possible information. If you have questions about this medicine, talk to your doctor, pharmacist, or health care provider.     © 2017, [employer]. (2017-01-19 10:06:55)

## 2017-11-20 NOTE — PROGRESS NOTES
Patient tolerated injection without complaint.  Patient discharged home ambulatory with  at 1037.

## 2017-11-27 ENCOUNTER — APPOINTMENT (OUTPATIENT)
Dept: WOMENS IMAGING | Facility: HOSPITAL | Age: 67
End: 2017-11-27

## 2017-11-27 PROCEDURE — 77063 BREAST TOMOSYNTHESIS BI: CPT | Performed by: RADIOLOGY

## 2017-11-27 PROCEDURE — G0202 SCR MAMMO BI INCL CAD: HCPCS | Performed by: RADIOLOGY

## 2018-02-15 ENCOUNTER — OFFICE VISIT (OUTPATIENT)
Dept: ENDOCRINOLOGY | Age: 68
End: 2018-02-15

## 2018-02-15 VITALS
DIASTOLIC BLOOD PRESSURE: 70 MMHG | HEART RATE: 78 BPM | BODY MASS INDEX: 25.52 KG/M2 | SYSTOLIC BLOOD PRESSURE: 122 MMHG | HEIGHT: 60 IN | WEIGHT: 130 LBS

## 2018-02-15 DIAGNOSIS — E16.1 HYPERINSULINISM: ICD-10-CM

## 2018-02-15 DIAGNOSIS — Z79.4 ENCOUNTER FOR LONG-TERM (CURRENT) USE OF INSULIN (HCC): ICD-10-CM

## 2018-02-15 DIAGNOSIS — IMO0002 UNCONTROLLED TYPE 2 DIABETES MELLITUS WITH BACKGROUND RETINOPATHY: ICD-10-CM

## 2018-02-15 DIAGNOSIS — M81.0 POSTMENOPAUSAL OSTEOPOROSIS: ICD-10-CM

## 2018-02-15 DIAGNOSIS — IMO0002 UNCONTROLLED TYPE 2 DIABETES MELLITUS WITH COMPLICATION, WITH LONG-TERM CURRENT USE OF INSULIN: Primary | ICD-10-CM

## 2018-02-15 LAB
ALBUMIN SERPL-MCNC: 4.1 G/DL (ref 3.5–5.2)
ALBUMIN/GLOB SERPL: 2 G/DL
ALP SERPL-CCNC: 29 U/L (ref 39–117)
ALT SERPL-CCNC: 26 U/L (ref 1–33)
AST SERPL-CCNC: 25 U/L (ref 1–32)
BILIRUB SERPL-MCNC: 0.6 MG/DL (ref 0.1–1.2)
BUN SERPL-MCNC: 25 MG/DL (ref 8–23)
BUN/CREAT SERPL: 23.4 (ref 7–25)
CALCIUM SERPL-MCNC: 9.9 MG/DL (ref 8.6–10.5)
CHLORIDE SERPL-SCNC: 103 MMOL/L (ref 98–107)
CO2 SERPL-SCNC: 24.4 MMOL/L (ref 22–29)
CREAT SERPL-MCNC: 1.07 MG/DL (ref 0.57–1)
GFR SERPLBLD CREATININE-BSD FMLA CKD-EPI: 51 ML/MIN/1.73
GFR SERPLBLD CREATININE-BSD FMLA CKD-EPI: 62 ML/MIN/1.73
GLOBULIN SER CALC-MCNC: 2.1 GM/DL
GLUCOSE SERPL-MCNC: 125 MG/DL (ref 65–99)
HBA1C MFR BLD: 5.3 % (ref 4.8–5.6)
POTASSIUM SERPL-SCNC: 4.8 MMOL/L (ref 3.5–5.2)
PROT SERPL-MCNC: 6.2 G/DL (ref 6–8.5)
SODIUM SERPL-SCNC: 139 MMOL/L (ref 136–145)

## 2018-02-15 PROCEDURE — 99214 OFFICE O/P EST MOD 30 MIN: CPT | Performed by: INTERNAL MEDICINE

## 2018-03-05 ENCOUNTER — LAB (OUTPATIENT)
Dept: LAB | Facility: HOSPITAL | Age: 68
End: 2018-03-05

## 2018-03-05 DIAGNOSIS — D50.9 IRON DEFICIENCY ANEMIA, UNSPECIFIED IRON DEFICIENCY ANEMIA TYPE: ICD-10-CM

## 2018-03-05 LAB
ALBUMIN SERPL-MCNC: 3.7 G/DL (ref 3.5–5.2)
ALBUMIN/GLOB SERPL: 1.7 G/DL (ref 1.1–2.4)
ALP SERPL-CCNC: 30 U/L (ref 38–116)
ALT SERPL W P-5'-P-CCNC: 17 U/L (ref 0–33)
ANION GAP SERPL CALCULATED.3IONS-SCNC: 11.2 MMOL/L
AST SERPL-CCNC: 19 U/L (ref 0–32)
BASOPHILS # BLD AUTO: 0.08 10*3/MM3 (ref 0–0.1)
BASOPHILS NFR BLD AUTO: 0.9 % (ref 0–1.1)
BILIRUB SERPL-MCNC: 0.3 MG/DL (ref 0.1–1.2)
BUN BLD-MCNC: 32 MG/DL (ref 6–20)
BUN/CREAT SERPL: 29.6 (ref 7.3–30)
CALCIUM SPEC-SCNC: 9.8 MG/DL (ref 8.5–10.2)
CHLORIDE SERPL-SCNC: 105 MMOL/L (ref 98–107)
CO2 SERPL-SCNC: 25.8 MMOL/L (ref 22–29)
CREAT BLD-MCNC: 1.08 MG/DL (ref 0.6–1.1)
DEPRECATED RDW RBC AUTO: 44.4 FL (ref 37–49)
EOSINOPHIL # BLD AUTO: 0.92 10*3/MM3 (ref 0–0.36)
EOSINOPHIL NFR BLD AUTO: 10 % (ref 1–5)
ERYTHROCYTE [DISTWIDTH] IN BLOOD BY AUTOMATED COUNT: 13.2 % (ref 11.7–14.5)
FERRITIN SERPL-MCNC: 352.5 NG/ML
GFR SERPL CREATININE-BSD FRML MDRD: 50 ML/MIN/1.73
GLOBULIN UR ELPH-MCNC: 2.2 GM/DL (ref 1.8–3.5)
GLUCOSE BLD-MCNC: 150 MG/DL (ref 74–124)
HCT VFR BLD AUTO: 35 % (ref 34–45)
HGB BLD-MCNC: 11.4 G/DL (ref 11.5–14.9)
HGB RETIC QN: 33.4 PG (ref 29.8–36.1)
IMM GRANULOCYTES # BLD: 0.09 10*3/MM3 (ref 0–0.03)
IMM GRANULOCYTES NFR BLD: 1 % (ref 0–0.5)
IMM RETICS NFR: 15.1 % (ref 3–15.8)
IRON 24H UR-MRATE: 73 MCG/DL (ref 37–145)
IRON SATN MFR SERPL: 19 % (ref 14–48)
LYMPHOCYTES # BLD AUTO: 1.41 10*3/MM3 (ref 1–3.5)
LYMPHOCYTES NFR BLD AUTO: 15.3 % (ref 20–49)
MCH RBC QN AUTO: 29.8 PG (ref 27–33)
MCHC RBC AUTO-ENTMCNC: 32.6 G/DL (ref 32–35)
MCV RBC AUTO: 91.6 FL (ref 83–97)
MONOCYTES # BLD AUTO: 0.64 10*3/MM3 (ref 0.25–0.8)
MONOCYTES NFR BLD AUTO: 7 % (ref 4–12)
NEUTROPHILS # BLD AUTO: 6.06 10*3/MM3 (ref 1.5–7)
NEUTROPHILS NFR BLD AUTO: 65.8 % (ref 39–75)
NRBC BLD MANUAL-RTO: 0 /100 WBC (ref 0–0)
PLATELET # BLD AUTO: 236 10*3/MM3 (ref 150–375)
PMV BLD AUTO: 11.3 FL (ref 8.9–12.1)
POTASSIUM BLD-SCNC: 4 MMOL/L (ref 3.5–4.7)
PROT SERPL-MCNC: 5.9 G/DL (ref 6.3–8)
RBC # BLD AUTO: 3.82 10*6/MM3 (ref 3.9–5)
RETICS/RBC NFR AUTO: 2.25 % (ref 0.6–2)
SODIUM BLD-SCNC: 142 MMOL/L (ref 134–145)
TIBC SERPL-MCNC: 375 MCG/DL (ref 249–505)
TRANSFERRIN SERPL-MCNC: 268 MG/DL (ref 200–360)
VIT B12 BLD-MCNC: >2000 PG/ML (ref 211–946)
WBC NRBC COR # BLD: 9.2 10*3/MM3 (ref 4–10)

## 2018-03-05 PROCEDURE — 85046 RETICYTE/HGB CONCENTRATE: CPT | Performed by: INTERNAL MEDICINE

## 2018-03-05 PROCEDURE — 36415 COLL VENOUS BLD VENIPUNCTURE: CPT | Performed by: INTERNAL MEDICINE

## 2018-03-05 PROCEDURE — 84466 ASSAY OF TRANSFERRIN: CPT | Performed by: INTERNAL MEDICINE

## 2018-03-05 PROCEDURE — 82607 VITAMIN B-12: CPT | Performed by: INTERNAL MEDICINE

## 2018-03-05 PROCEDURE — 80053 COMPREHEN METABOLIC PANEL: CPT | Performed by: INTERNAL MEDICINE

## 2018-03-05 PROCEDURE — 82728 ASSAY OF FERRITIN: CPT | Performed by: INTERNAL MEDICINE

## 2018-03-05 PROCEDURE — 85025 COMPLETE CBC W/AUTO DIFF WBC: CPT | Performed by: INTERNAL MEDICINE

## 2018-03-05 PROCEDURE — 83540 ASSAY OF IRON: CPT | Performed by: INTERNAL MEDICINE

## 2018-03-11 NOTE — PROGRESS NOTES
[facility] GROUP OUTPATIENT FOLLOW UP CLINIC VISIT    REASON FOR FOLLOW-UP:    1.  Iron deficiency anemia.  Her last intravenous Feraheme infusions or in October 2015.    HISTORY OF PRESENT ILLNESS:  Shirin Washington is a 67 y.o. female who returns today for follow up of the above issue.    She continues to have some abdominal bloating and fullness associated with gastroparesis.  She remains fatigued.  She had 2 urinary tract infections recently and also had influenza.  Vertigo has improved but remains a problem with certain activities.  Back pain is stable at this point and she states that she can deal with it.      PAST MEDICAL, SURGICAL, FAMILY, AND SOCIAL HISTORIES were reviewed with the patient and in the electronic medical record, and were updated if indicated.    ALLERGIES:  Allergies   Allergen Reactions   • Topamax [Topiramate]        MEDICATIONS:  The medication list has been reviewed with the patient by the medical assistant, and the list has been updated in the electronic medical record, which I reviewed.  Medication dosages and frequencies were confirmed to be accurate.    REVIEW OF SYSTEMS:  PAIN:  See Vital Signs below.  GENERAL:  No fevers, chills, night sweats, or unintended weight loss.  Fatigue.  See history of present illness.  SKIN:  No rashes or non-healing lesions.  HEME/LYMPH:  No abnormal bleeding.  No palpable lymphadenopathy.  EYES:  No vision changes or diplopia.  ENT:  Vertigo as noted in the history of present illness  RESPIRATORY:  No cough, shortness of breath, hemoptysis, or wheezing.  CARDIOVASCULAR:  No chest pain or shortness of breath  GASTROINTESTINAL:  See history of present illness  GENITOURINARY:  No dysuria or hematuria.  MUSCULOSKELETAL: Chronic back pain  NEUROLOGIC:  No dizziness, loss of consciousness, or seizures.  PSYCHIATRIC:  No depression, anxiety, or mood changes.    Vitals:    03/12/18 0917   BP: 140/80   Pulse: 73   Resp: 14   Temp: 98.1 °F (36.7 °C)  "  TempSrc: Oral   SpO2: 100%   Weight: 58.2 kg (128 lb 6.4 oz)   Height: 152 cm (59.84\")   PainSc: 4  Comment: stomach pain intermittently       PHYSICAL EXAMINATION:  GENERAL:  Well-developed well-nourished female; awake, alert and oriented, in no acute distress.  SKIN:  Warm and dry, without rashes, purpura, or petechiae.  HEAD:  Normocephalic, atraumatic.  EYES:  Pupils equal, round and reactive to light.  Extraocular movements intact.  Conjunctivae normal.  EARS:  Hearing intact.  NOSE:  Septum midline.  No excoriations or nasal discharge.  MOUTH:  No stomatitis or ulcers.  Lips are normal.  THROAT:  Oropharynx without lesions or exudates.  NECK:  Supple with good range of motion; no thyromegaly or masses; no JVD or bruits.  LYMPHATICS:  No cervical, supraclavicular, axillary, or inguinal lymphadenopathy.  CHEST:  Lungs are clear to auscultation bilaterally.  No wheezes, rales, or rhonchi.  HEART:  Regular rate; normal rhythm.  No murmurs, gallops or rubs.  EXTREMITIES:  No clubbing, cyanosis, or edema.  NEUROLOGICAL:  No focal neurologic deficits.    DIAGNOSTIC DATA:  Lab Results   Component Value Date    WBC 9.20 03/05/2018    HGB 11.4 (L) 03/05/2018    HCT 35.0 03/05/2018    MCV 91.6 03/05/2018     03/05/2018     Labs on 3/5/2018:  Ferritin 352  19% iron saturation, iron 73  Vitamin B12 greater than 2000    ASSESSMENT:  This is a 67 y.o. female with:  1.  History of iron deficiency anemia: She required intravenous Feraheme last in October 2015 and we gave 2 more doses in September and October 2017.  No obvious GI blood loss.  I suspect she has some malabsorption.  She has symptoms consistent with gastroparesis.  She also received vitamin B12 injections in the past and she received 2 injections with her Feraheme infusions.  She is taking oral vitamin B12 about 3 days per week.  Her labs look great at this point with no evidence for iron or vitamin B12 deficiency.  2.  Fatigue: I am hopeful that this " will improve as she becomes more active in the spring and summer.  3.  Gastroparesis      PLAN:  1.  I will see her back in 6 months for follow-up with labs in 1 week prior.  2.  As her vitamin B12 level is elevated she can decrease her oral supplement to one day per week.

## 2018-03-12 ENCOUNTER — OFFICE VISIT (OUTPATIENT)
Dept: ONCOLOGY | Facility: CLINIC | Age: 68
End: 2018-03-12

## 2018-03-12 ENCOUNTER — APPOINTMENT (OUTPATIENT)
Dept: LAB | Facility: HOSPITAL | Age: 68
End: 2018-03-12

## 2018-03-12 VITALS
TEMPERATURE: 98.1 F | BODY MASS INDEX: 25.21 KG/M2 | OXYGEN SATURATION: 100 % | DIASTOLIC BLOOD PRESSURE: 80 MMHG | RESPIRATION RATE: 14 BRPM | HEART RATE: 73 BPM | HEIGHT: 60 IN | SYSTOLIC BLOOD PRESSURE: 140 MMHG | WEIGHT: 128.4 LBS

## 2018-03-12 DIAGNOSIS — D50.9 IRON DEFICIENCY ANEMIA, UNSPECIFIED IRON DEFICIENCY ANEMIA TYPE: Primary | ICD-10-CM

## 2018-03-12 PROCEDURE — G0463 HOSPITAL OUTPT CLINIC VISIT: HCPCS | Performed by: INTERNAL MEDICINE

## 2018-03-12 PROCEDURE — 99213 OFFICE O/P EST LOW 20 MIN: CPT | Performed by: INTERNAL MEDICINE

## 2018-05-09 DIAGNOSIS — IMO0002 DIABETES MELLITUS TYPE 2, UNCONTROLLED, WITH COMPLICATIONS: ICD-10-CM

## 2018-05-17 PROBLEM — M81.0 OSTEOPOROSIS, POST-MENOPAUSAL: Status: ACTIVE | Noted: 2018-05-17

## 2018-05-18 ENCOUNTER — OFFICE VISIT (OUTPATIENT)
Dept: ENDOCRINOLOGY | Age: 68
End: 2018-05-18

## 2018-05-18 VITALS
DIASTOLIC BLOOD PRESSURE: 86 MMHG | BODY MASS INDEX: 25.32 KG/M2 | HEIGHT: 60 IN | WEIGHT: 129 LBS | SYSTOLIC BLOOD PRESSURE: 134 MMHG | HEART RATE: 98 BPM

## 2018-05-18 DIAGNOSIS — E16.1 HYPERINSULINISM: ICD-10-CM

## 2018-05-18 DIAGNOSIS — M81.0 OSTEOPOROSIS, POST-MENOPAUSAL: ICD-10-CM

## 2018-05-18 DIAGNOSIS — IMO0002 UNCONTROLLED TYPE 2 DIABETES MELLITUS WITH BACKGROUND RETINOPATHY: ICD-10-CM

## 2018-05-18 DIAGNOSIS — E03.9 HYPOTHYROIDISM, UNSPECIFIED TYPE: ICD-10-CM

## 2018-05-18 DIAGNOSIS — IMO0002 UNCONTROLLED TYPE 2 DIABETES MELLITUS WITH COMPLICATION, WITH LONG-TERM CURRENT USE OF INSULIN: Primary | ICD-10-CM

## 2018-05-18 DIAGNOSIS — Z79.4 ENCOUNTER FOR LONG-TERM (CURRENT) USE OF INSULIN (HCC): ICD-10-CM

## 2018-05-18 LAB
HBA1C MFR BLD: 4.9 % (ref 4.8–5.6)
T4 FREE SERPL-MCNC: 1.7 NG/DL (ref 0.93–1.7)
TSH SERPL DL<=0.005 MIU/L-ACNC: 1.29 MIU/ML (ref 0.27–4.2)

## 2018-05-18 PROCEDURE — 99214 OFFICE O/P EST MOD 30 MIN: CPT | Performed by: INTERNAL MEDICINE

## 2018-05-18 RX ORDER — LEVOTHYROXINE SODIUM 0.05 MG/1
50 TABLET ORAL DAILY
COMMUNITY
End: 2018-09-12 | Stop reason: SDUPTHER

## 2018-05-18 RX ORDER — NITROGLYCERIN 0.4 MG/1
0.4 TABLET SUBLINGUAL
COMMUNITY
Start: 2018-03-30 | End: 2022-02-11

## 2018-05-18 RX ORDER — ROSUVASTATIN CALCIUM 40 MG/1
TABLET, COATED ORAL
COMMUNITY
Start: 2018-03-30 | End: 2019-07-25

## 2018-05-18 NOTE — PROGRESS NOTES
67 y.o.    Patient Care Team:  Duncan Domingo MD as PCP - General (Internal Medicine)  Axel Lobato MD as Consulting Physician (Hematology and Oncology)  Chika Ospina MD as Referring Physician (General Surgery)    Chief Complaint:      F/U TYPE 2 DIABETES, UNCONTROLLED.  NO RECENT LABS.    Subjective   HPI  Patient is a 67-year-old white female with a past history of uncontrolled type 2 diabetes mellitus with complications came for follow-up  Patient was seeing Dr. Tam in the past    Uncontrolled type 2 diabetes mellitus  Patient is currently taking tresiba 20 units at night  She takes NovoLog 1 unit for every 10 carbs and one unit for every 25/125 correction  Patient reports that most of her blood sugars are in the  range rarely the are in the 200s  Hypoglycemic and patient has an occasional hypoglycemia with blood sugar dropping into 60 range  Her recent hemoglobin A1c was 5.2%  Uncontrolled type 2 diabetes mellitus with neuropathy  Patient is diagnosed with diabetic gastroparesis  Uncontrolled type 2 diabetes mellitus with background diabetic retinopathy  Patient has regular follow-up with ophthalmologist  Patient denies any knowledge of diabetic nephropathy  Postmenopausal osteoporosis  Patient is currently on prolia since May 2017.  She denies any side effects from medication.       Diabetes   She has type 2 diabetes mellitus. No MedicAlert identification noted. The initial diagnosis of diabetes was made 21 years ago. Hypoglycemia symptoms include dizziness, hunger and sleepiness. Pertinent negatives for hypoglycemia include no confusion, headaches, mood changes, nervousness/anxiousness, pallor, seizures, speech difficulty, sweats or tremors. Associated symptoms include fatigue, polyphagia, polyuria and weakness. Pertinent negatives for diabetes include no blurred vision, no chest pain, no foot paresthesias, no foot ulcerations, no polydipsia, no visual change and no weight loss. Hypoglycemia  complications include nocturnal hypoglycemia. Pertinent negatives for hypoglycemia complications include no blackouts, no hospitalization, no required assistance and no required glucagon injection. Symptoms are stable. Pertinent negatives for diabetic complications include no CVA, heart disease, impotence, nephropathy, peripheral neuropathy, PVD or retinopathy. Risk factors for coronary artery disease include dyslipidemia, family history and hypertension. Current diabetic treatment includes insulin injections. She is compliant with treatment most of the time. She is currently taking insulin pre-breakfast, pre-lunch, pre-dinner and at bedtime. Insulin injections are given by patient. Rotation sites for injection include the abdominal wall. Her weight is stable. She is following a generally healthy diet. Meal planning includes avoidance of concentrated sweets. She has not had a previous visit with a dietitian. She participates in exercise intermittently. She monitors blood glucose at home 5+ x per day. She monitors urine at home <1 x per month. Blood glucose monitoring compliance is good. Her home blood glucose trend is fluctuating minimally. Her breakfast blood glucose is taken between 5-6 am. Her breakfast blood glucose range is generally  mg/dl. Her lunch blood glucose is taken between 11-12 pm. Her lunch blood glucose range is generally 130-140 mg/dl. Her dinner blood glucose is taken between 4-5 pm. Her dinner blood glucose range is generally 130-140 mg/dl. Her highest blood glucose is 180-200 mg/dl. Her overall blood glucose range is 110-130 mg/dl. She does not see a podiatrist.Eye exam is current.         Interval History    Summary  Uncontrolled type 2 diabetes mellitus  Patient was diagnosed approximately 18 years ago. She reported that she's taking Levemir 15 units at night and takes NovoLog one unit for every 10 carbohydrates and a correction scale of one unit for every 25 mg over 125.  Patient  reported that most of the blood sugars are in the 100-200 range. Occasionally she has a blood sugar 75-90. She keeps adjusting her insulin based on the blood sugars      Uncontrolled type 2 diabetes mellitus with retinopathy.  Patient reports to have had background diabetic retinopathy in the right eye which apparently stable for the past several years.  Patient denied any knowledge of symptoms of diabetic neuropathy or nephropathy.  Patient also denied any coronary artery disease or TIA or stroke.  Patient is physically very active, she plays golf almost daily.      Hypoglycemia  Patient periodically gets iron infusions for anemia and has had episodes of hypoglycemia.  Hyperlipidemia  Patient is currently on Lipitor 80 mg as well as TriCor 145 mg daily. She denies any side effects on the medication.  Hypertension  Patient is currently on Norvasc 2.5 mg daily as well as Tenormin 25 mg daily, Maxzide one tablet daily. Her blood pressure is stable currently.  Patient denied any side effects on the medication  The following portions of the patient's history were reviewed and updated as appropriate: allergies, current medications, past family history, past medical history, past social history, past surgical history and problem list.    Past Medical History:   Diagnosis Date   • Anemia    • Arthritis    • Back pain    • Background diabetic retinopathy of right eye determined by examination    • Coronary atherosclerosis     cath 1/2014 with 50% mid LAD, otherwise unremarkable   • Diabetes mellitus type 2, insulin dependent    • Gastroparesis due to secondary diabetes    • GERD (gastroesophageal reflux disease)    • History of spinal stenosis    • History of spondylolisthesis    • History of URI (upper respiratory infection)    • Hypercholesteremia    • Hyperlipidemia    • Hypertension    • Iron deficiency anemia    • Left-sided weakness     ARM AND LEG   • Leukocytosis     MILD   • Osteopenia    • Osteoporosis    •  Peripheral neuropathy    • Retinopathy    • Vertigo      Family History   Problem Relation Age of Onset   • Polycythemia Mother         Progressed to secondary myelofibrosis   • Cirrhosis Father    • Diabetes Other    • Hypertension Other    • Cancer Neg Hx      Social History     Social History   • Marital status:      Spouse name: Yehuda   • Number of children: N/A   • Years of education: N/A     Occupational History   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     Social History Main Topics   • Smoking status: Never Smoker   • Smokeless tobacco: Never Used   • Alcohol use Yes      Comment: OCCASIONAL   • Drug use: No   • Sexual activity: Defer     Other Topics Concern   • Not on file     Social History Narrative   • No narrative on file     Allergies   Allergen Reactions   • Topamax [Topiramate]        Current Outpatient Prescriptions:   •  ALPRAZolam (XANAX) 0.25 MG tablet, 2 (Two) Times a Day., Disp: , Rfl:   •  amLODIPine (NORVASC) 2.5 MG tablet, Take  by mouth Daily., Disp: , Rfl:   •  aspirin 81 MG EC tablet, Take  by mouth Daily., Disp: , Rfl:   •  atenolol (TENORMIN) 50 MG tablet, Take 1 tablet by mouth Daily., Disp: 30 tablet, Rfl: 6  •  atorvastatin (LIPITOR) 80 MG tablet, Take  by mouth Daily., Disp: , Rfl:   •  denosumab (PROLIA) 60 MG/ML solution syringe, Inject 60 mg under the skin Every 6 (Six) Months., Disp: , Rfl:   •  fenofibrate (TRICOR) 145 MG tablet, Take  by mouth Daily., Disp: , Rfl:   •  insulin aspart (novoLOG FLEXPEN) 100 UNIT/ML solution pen-injector sc pen, Inject 15 Units under the skin 3 (Three) Times a Day With Meals., Disp: 30 mL, Rfl: 2  •  Insulin Degludec (TRESIBA FLEXTOUCH) 200 UNIT/ML solution pen-injector, Inject 30 Units under the skin Daily. (Patient taking differently: Inject 24 Units under the skin Daily.), Disp: 9 pen, Rfl: 3  •  Insulin Pen Needle (BD PEN NEEDLE SOCO U/F) 32G X 4 MM misc, 4 times daily, Disp: 200 each, Rfl: 3  •   "meloxicam (MOBIC) 15 MG tablet, Take 15 mg by mouth daily., Disp: , Rfl:   •  ONE TOUCH ULTRA TEST test strip, USE TO TEST BLOOD SUGAR 8 TIMES DAILY, Disp: 700 each, Rfl: 2  •  ONETOUCH DELICA LANCETS 33G misc, , Disp: , Rfl:   •  triamterene-hydrochlorothiazide (MAXZIDE-25) 37.5-25 MG per tablet, Take  by mouth. TAKES ONE HALF TABLET DAILY, Disp: , Rfl:   •  zolpidem (AMBIEN) 10 MG tablet, Take  by mouth., Disp: , Rfl:         Review of Systems   Constitutional: Positive for fatigue. Negative for weight loss.   Eyes: Negative for blurred vision.   Cardiovascular: Negative for chest pain.   Endocrine: Positive for polyphagia and polyuria. Negative for polydipsia.   Genitourinary: Negative for impotence.   Skin: Negative for pallor.   Neurological: Positive for dizziness and weakness. Negative for tremors, seizures, speech difficulty and headaches.   Psychiatric/Behavioral: Negative for confusion. The patient is not nervous/anxious.    All other systems reviewed and are negative.      Objective       Vitals:    05/18/18 0930   BP: 134/86   Pulse: 98   Weight: 58.5 kg (129 lb)   Height: 152.4 cm (60\")     Body mass index is 25.19 kg/m².      Physical Exam   Constitutional: She is oriented to person, place, and time. She appears well-developed and well-nourished.   HENT:   Head: Normocephalic and atraumatic.   Eyes: EOM are normal. Pupils are equal, round, and reactive to light.   Neck: Normal range of motion. No thyromegaly present.   Cardiovascular: Normal rate, regular rhythm, normal heart sounds and intact distal pulses.    Pulmonary/Chest: Effort normal and breath sounds normal.   Abdominal: Soft. Bowel sounds are normal. She exhibits no distension. There is no tenderness.   Musculoskeletal: Normal range of motion. She exhibits no edema.   Neurological: She is alert and oriented to person, place, and time. She has normal reflexes.   Skin: Skin is warm and dry.   Psychiatric: She has a normal mood and affect. Her " behavior is normal.   Nursing note and vitals reviewed.    Results Review:     I reviewed the patient's new clinical results.    Medical records reviewed  Summary:      Lab on 03/05/2018   Component Date Value Ref Range Status   • Glucose 03/05/2018 150* 74 - 124 mg/dL Final   • BUN 03/05/2018 32* 6 - 20 mg/dL Final   • Creatinine 03/05/2018 1.08  0.60 - 1.10 mg/dL Final   • Sodium 03/05/2018 142  134 - 145 mmol/L Final   • Potassium 03/05/2018 4.0  3.5 - 4.7 mmol/L Final   • Chloride 03/05/2018 105  98 - 107 mmol/L Final   • CO2 03/05/2018 25.8  22.0 - 29.0 mmol/L Final   • Calcium 03/05/2018 9.8  8.5 - 10.2 mg/dL Final   • Total Protein 03/05/2018 5.9* 6.3 - 8.0 g/dL Final   • Albumin 03/05/2018 3.70  3.50 - 5.20 g/dL Final   • ALT (SGPT) 03/05/2018 17  0 - 33 U/L Final   • AST (SGOT) 03/05/2018 19  0 - 32 U/L Final   • Alkaline Phosphatase 03/05/2018 30* 38 - 116 U/L Final   • Total Bilirubin 03/05/2018 0.3  0.1 - 1.2 mg/dL Final   • eGFR Non African Amer 03/05/2018 50* >60 mL/min/1.73 Final   • Globulin 03/05/2018 2.2  1.8 - 3.5 gm/dL Final   • A/G Ratio 03/05/2018 1.7  1.1 - 2.4 g/dL Final   • BUN/Creatinine Ratio 03/05/2018 29.6  7.3 - 30.0 Final   • Anion Gap 03/05/2018 11.2  mmol/L Final   • Ferritin 03/05/2018 352.50  ng/mL Final   • Iron 03/05/2018 73  37 - 145 mcg/dL Final   • Iron Saturation 03/05/2018 19  14 - 48 % Final   • Transferrin 03/05/2018 268  200 - 360 mg/dL Final   • TIBC 03/05/2018 375  249 - 505 mcg/dL Final   • Immature Reticulocyte Fraction 03/05/2018 15.1  3.0 - 15.8 % Final   • Reticulocyte % 03/05/2018 2.25* 0.60 - 2.00 % Final   • Reticulocyte Hgb 03/05/2018 33.4  29.8 - 36.1 pg Final   • Vitamin B-12 03/05/2018 >2000* 211 - 946 pg/mL Final   • WBC 03/05/2018 9.20  4.00 - 10.00 10*3/mm3 Final   • RBC 03/05/2018 3.82* 3.90 - 5.00 10*6/mm3 Final   • Hemoglobin 03/05/2018 11.4* 11.5 - 14.9 g/dL Final   • Hematocrit 03/05/2018 35.0  34.0 - 45.0 % Final   • MCV 03/05/2018 91.6  83.0 - 97.0  fL Final   • MCH 03/05/2018 29.8  27.0 - 33.0 pg Final   • MCHC 03/05/2018 32.6  32.0 - 35.0 g/dL Final   • RDW 03/05/2018 13.2  11.7 - 14.5 % Final   • RDW-SD 03/05/2018 44.4  37.0 - 49.0 fl Final   • MPV 03/05/2018 11.3  8.9 - 12.1 fL Final   • Platelets 03/05/2018 236  150 - 375 10*3/mm3 Final   • Neutrophil % 03/05/2018 65.8  39.0 - 75.0 % Final   • Lymphocyte % 03/05/2018 15.3* 20.0 - 49.0 % Final   • Monocyte % 03/05/2018 7.0  4.0 - 12.0 % Final   • Eosinophil % 03/05/2018 10.0* 1.0 - 5.0 % Final   • Basophil % 03/05/2018 0.9  0.0 - 1.1 % Final   • Immature Grans % 03/05/2018 1.0* 0.0 - 0.5 % Final   • Neutrophils, Absolute 03/05/2018 6.06  1.50 - 7.00 10*3/mm3 Final   • Lymphocytes, Absolute 03/05/2018 1.41  1.00 - 3.50 10*3/mm3 Final   • Monocytes, Absolute 03/05/2018 0.64  0.25 - 0.80 10*3/mm3 Final   • Eosinophils, Absolute 03/05/2018 0.92* 0.00 - 0.36 10*3/mm3 Final   • Basophils, Absolute 03/05/2018 0.08  0.00 - 0.10 10*3/mm3 Final   • Immature Grans, Absolute 03/05/2018 0.09* 0.00 - 0.03 10*3/mm3 Final   • nRBC 03/05/2018 0.0  0.0 - 0.0 /100 WBC Final     Lab Results   Component Value Date    HGBA1C 5.30 02/15/2018    HGBA1C 6.30 (H) 07/07/2017    HGBA1C 6.66 (H) 12/29/2016     Lab Results   Component Value Date    MICROALBUR <3.0 07/07/2017    CREATININE 1.08 03/05/2018     Imaging Results (most recent)     None          Assessment and Plan:    Shirin was seen today for diabetes.    Diagnoses and all orders for this visit:    Uncontrolled type 2 diabetes mellitus with complication, with long-term current use of insulin  -     Hemoglobin A1c  -     TSH  -     T4, Free    Osteoporosis, post-menopausal    Encounter for long-term (current) use of insulin    Uncontrolled type 2 diabetes mellitus with background retinopathy    Hyperinsulinism    Hypothyroidism, unspecified type  -     Hemoglobin A1c  -     TSH  -     T4, Free      Patient's glucometer download reviewed  Majority of the blood sugars are  "between   Lab reports from primary care reviewed  Patient's hemoglobin A1c is 5.2% lower than before  Patient has only one episode of hypoglycemia documented at 67 before supper  No fasting hyperglycemia noted    But patient reports that she feels low all the time  She has been cutting back tresiba to 20 units at night  She is still taking NovoLog 1 unit for every 10 carbs and one unit for every 25/125 correction    Patient also reports receiving iron transfusions in the October and November 2017    she denies any blood transfusions  Patient is not significantly anemic to explain the low hemoglobin A1c but higher Accu-Chek readings    I advised the patient to change her glucometer next line she will repeat the hemoglobin A1c and thyroid function testing today  Patient started 50 µg levothyroxine 6 weeks ago when her TSH was 5.4  She does report some improvement in her symptoms of cold intolerance and fatigue.  She is able to walk all the 18 hole golf course recently  Patient will get lab testing done today  She will return to follow-up in 3-4 months.     The total time spent for old record and lab review and face- to- face was more than 25 min of which greater than 15 min of time ( greater than 50% of the total time )  was spent face to face with the patient counseling and coordination of care on recommended evaluation and treatment options, instructions for management/treatment and /or follow up  and importance of compliance with chosen management or treatment options  Frank Allen MD. FACE    05/18/18      [facility] Dragon / transcription disclaimer:     \"Dictated utilizing Dragon dictation\".         "

## 2018-05-21 ENCOUNTER — TELEPHONE (OUTPATIENT)
Dept: ENDOCRINOLOGY | Age: 68
End: 2018-05-21

## 2018-05-21 ENCOUNTER — HOSPITAL ENCOUNTER (OUTPATIENT)
Dept: INFUSION THERAPY | Facility: HOSPITAL | Age: 68
Discharge: HOME | End: 2018-05-21
Admitting: INTERNAL MEDICINE

## 2018-05-21 VITALS
HEART RATE: 71 BPM | RESPIRATION RATE: 16 BRPM | SYSTOLIC BLOOD PRESSURE: 138 MMHG | DIASTOLIC BLOOD PRESSURE: 66 MMHG | BODY MASS INDEX: 25.32 KG/M2 | HEIGHT: 60 IN | TEMPERATURE: 98.6 F | WEIGHT: 129 LBS | OXYGEN SATURATION: 100 %

## 2018-05-21 DIAGNOSIS — M81.0 OSTEOPOROSIS, UNSPECIFIED OSTEOPOROSIS TYPE, UNSPECIFIED PATHOLOGICAL FRACTURE PRESENCE: ICD-10-CM

## 2018-05-21 DIAGNOSIS — IMO0002 UNCONTROLLED TYPE 2 DIABETES MELLITUS WITH BACKGROUND RETINOPATHY: Primary | ICD-10-CM

## 2018-05-21 DIAGNOSIS — IMO0002 UNCONTROLLED TYPE 2 DIABETES MELLITUS WITH COMPLICATION, WITH LONG-TERM CURRENT USE OF INSULIN: ICD-10-CM

## 2018-05-21 DIAGNOSIS — M81.0 OSTEOPOROSIS, POST-MENOPAUSAL: ICD-10-CM

## 2018-05-21 PROCEDURE — 96372 THER/PROPH/DIAG INJ SC/IM: CPT

## 2018-05-21 PROCEDURE — 25010000002 DENOSUMAB 60 MG/ML SOLUTION: Performed by: INTERNAL MEDICINE

## 2018-05-21 RX ADMIN — DENOSUMAB 60 MG: 60 INJECTION SUBCUTANEOUS at 09:21

## 2018-05-21 NOTE — TELEPHONE ENCOUNTER
----- Message from Darrel Guzman sent at 5/18/2018 11:46 AM EDT -----  Contact: PATIENT  PATIENT NEEDS THE ONE TOUCH VERIO TEST STRIPS FOR THE NEW METER CALLED INTO [address] AT [facility] - [phone]  - [phone] (Phone)  [phone] (Fax)    SCRIPT SENT FOR STRIPS

## 2018-05-22 DIAGNOSIS — IMO0002 UNCONTROLLED TYPE 2 DIABETES MELLITUS WITH COMPLICATION, WITH LONG-TERM CURRENT USE OF INSULIN: ICD-10-CM

## 2018-05-22 DIAGNOSIS — IMO0002 UNCONTROLLED TYPE 2 DIABETES MELLITUS WITH BACKGROUND RETINOPATHY: ICD-10-CM

## 2018-05-22 RX ORDER — BLOOD SUGAR DIAGNOSTIC
STRIP MISCELLANEOUS
Qty: 550 EACH | Refills: 3 | Status: SHIPPED | OUTPATIENT
Start: 2018-05-22 | End: 2020-06-29

## 2018-08-31 ENCOUNTER — LAB (OUTPATIENT)
Dept: LAB | Facility: HOSPITAL | Age: 68
End: 2018-08-31

## 2018-08-31 DIAGNOSIS — D50.9 IRON DEFICIENCY ANEMIA, UNSPECIFIED IRON DEFICIENCY ANEMIA TYPE: ICD-10-CM

## 2018-08-31 LAB
ALBUMIN SERPL-MCNC: 4.1 G/DL (ref 3.5–5.2)
ALBUMIN/GLOB SERPL: 1.6 G/DL (ref 1.1–2.4)
ALP SERPL-CCNC: 32 U/L (ref 38–116)
ALT SERPL W P-5'-P-CCNC: 15 U/L (ref 0–33)
ANION GAP SERPL CALCULATED.3IONS-SCNC: 9 MMOL/L
AST SERPL-CCNC: 16 U/L (ref 0–32)
BASOPHILS # BLD AUTO: 0.05 10*3/MM3 (ref 0–0.1)
BASOPHILS NFR BLD AUTO: 0.7 % (ref 0–1.1)
BILIRUB SERPL-MCNC: 0.4 MG/DL (ref 0.1–1.2)
BUN BLD-MCNC: 29 MG/DL (ref 6–20)
BUN/CREAT SERPL: 27.6 (ref 7.3–30)
CALCIUM SPEC-SCNC: 9.5 MG/DL (ref 8.5–10.2)
CHLORIDE SERPL-SCNC: 103 MMOL/L (ref 98–107)
CO2 SERPL-SCNC: 27 MMOL/L (ref 22–29)
CREAT BLD-MCNC: 1.05 MG/DL (ref 0.6–1.1)
DEPRECATED RDW RBC AUTO: 44.5 FL (ref 37–49)
EOSINOPHIL # BLD AUTO: 0.63 10*3/MM3 (ref 0–0.36)
EOSINOPHIL NFR BLD AUTO: 9 % (ref 1–5)
ERYTHROCYTE [DISTWIDTH] IN BLOOD BY AUTOMATED COUNT: 14 % (ref 11.7–14.5)
FERRITIN SERPL-MCNC: 75.9 NG/ML
GFR SERPL CREATININE-BSD FRML MDRD: 52 ML/MIN/1.73
GLOBULIN UR ELPH-MCNC: 2.5 GM/DL (ref 1.8–3.5)
GLUCOSE BLD-MCNC: 156 MG/DL (ref 74–124)
HCT VFR BLD AUTO: 37.6 % (ref 34–45)
HGB BLD-MCNC: 12.1 G/DL (ref 11.5–14.9)
HGB RETIC QN: 31.4 PG (ref 29.8–36.1)
IMM GRANULOCYTES # BLD: 0.03 10*3/MM3 (ref 0–0.03)
IMM GRANULOCYTES NFR BLD: 0.4 % (ref 0–0.5)
IMM RETICS NFR: 12.4 % (ref 3–15.8)
IRON 24H UR-MRATE: 62 MCG/DL (ref 37–145)
IRON SATN MFR SERPL: 15 % (ref 14–48)
LYMPHOCYTES # BLD AUTO: 1.2 10*3/MM3 (ref 1–3.5)
LYMPHOCYTES NFR BLD AUTO: 17.2 % (ref 20–49)
MCH RBC QN AUTO: 27.9 PG (ref 27–33)
MCHC RBC AUTO-ENTMCNC: 32.2 G/DL (ref 32–35)
MCV RBC AUTO: 86.6 FL (ref 83–97)
MONOCYTES # BLD AUTO: 0.47 10*3/MM3 (ref 0.25–0.8)
MONOCYTES NFR BLD AUTO: 6.7 % (ref 4–12)
NEUTROPHILS # BLD AUTO: 4.61 10*3/MM3 (ref 1.5–7)
NEUTROPHILS NFR BLD AUTO: 66 % (ref 39–75)
NRBC BLD MANUAL-RTO: 0 /100 WBC (ref 0–0)
PLATELET # BLD AUTO: 234 10*3/MM3 (ref 150–375)
PMV BLD AUTO: 11 FL (ref 8.9–12.1)
POTASSIUM BLD-SCNC: 4.5 MMOL/L (ref 3.5–4.7)
PROT SERPL-MCNC: 6.6 G/DL (ref 6.3–8)
RBC # BLD AUTO: 4.34 10*6/MM3 (ref 3.9–5)
RETICS/RBC NFR AUTO: 1.84 % (ref 0.6–2)
SODIUM BLD-SCNC: 139 MMOL/L (ref 134–145)
TIBC SERPL-MCNC: 427 MCG/DL (ref 249–505)
TRANSFERRIN SERPL-MCNC: 305 MG/DL (ref 200–360)
VIT B12 BLD-MCNC: 383 PG/ML (ref 211–946)
WBC NRBC COR # BLD: 6.99 10*3/MM3 (ref 4–10)

## 2018-08-31 PROCEDURE — 36415 COLL VENOUS BLD VENIPUNCTURE: CPT | Performed by: INTERNAL MEDICINE

## 2018-08-31 PROCEDURE — 80053 COMPREHEN METABOLIC PANEL: CPT | Performed by: INTERNAL MEDICINE

## 2018-08-31 PROCEDURE — 85046 RETICYTE/HGB CONCENTRATE: CPT | Performed by: INTERNAL MEDICINE

## 2018-08-31 PROCEDURE — 82728 ASSAY OF FERRITIN: CPT | Performed by: INTERNAL MEDICINE

## 2018-08-31 PROCEDURE — 82607 VITAMIN B-12: CPT | Performed by: INTERNAL MEDICINE

## 2018-08-31 PROCEDURE — 85025 COMPLETE CBC W/AUTO DIFF WBC: CPT | Performed by: INTERNAL MEDICINE

## 2018-08-31 PROCEDURE — 83540 ASSAY OF IRON: CPT | Performed by: INTERNAL MEDICINE

## 2018-08-31 PROCEDURE — 84466 ASSAY OF TRANSFERRIN: CPT | Performed by: INTERNAL MEDICINE

## 2018-09-10 ENCOUNTER — APPOINTMENT (OUTPATIENT)
Dept: LAB | Facility: HOSPITAL | Age: 68
End: 2018-09-10

## 2018-09-10 ENCOUNTER — OFFICE VISIT (OUTPATIENT)
Dept: ONCOLOGY | Facility: CLINIC | Age: 68
End: 2018-09-10

## 2018-09-10 VITALS
TEMPERATURE: 98.4 F | OXYGEN SATURATION: 99 % | WEIGHT: 129.6 LBS | DIASTOLIC BLOOD PRESSURE: 70 MMHG | SYSTOLIC BLOOD PRESSURE: 128 MMHG | HEART RATE: 71 BPM | RESPIRATION RATE: 12 BRPM | BODY MASS INDEX: 25.45 KG/M2 | HEIGHT: 60 IN

## 2018-09-10 DIAGNOSIS — D50.9 IRON DEFICIENCY ANEMIA, UNSPECIFIED IRON DEFICIENCY ANEMIA TYPE: Primary | ICD-10-CM

## 2018-09-10 PROCEDURE — 99213 OFFICE O/P EST LOW 20 MIN: CPT | Performed by: INTERNAL MEDICINE

## 2018-09-10 PROCEDURE — G0463 HOSPITAL OUTPT CLINIC VISIT: HCPCS | Performed by: INTERNAL MEDICINE

## 2018-09-12 ENCOUNTER — OFFICE VISIT (OUTPATIENT)
Dept: ENDOCRINOLOGY | Age: 68
End: 2018-09-12

## 2018-09-12 VITALS
HEIGHT: 60 IN | WEIGHT: 130.4 LBS | DIASTOLIC BLOOD PRESSURE: 62 MMHG | HEART RATE: 80 BPM | SYSTOLIC BLOOD PRESSURE: 122 MMHG | BODY MASS INDEX: 25.6 KG/M2

## 2018-09-12 DIAGNOSIS — M81.0 POSTMENOPAUSAL OSTEOPOROSIS: ICD-10-CM

## 2018-09-12 DIAGNOSIS — IMO0002 UNCONTROLLED TYPE 2 DIABETES MELLITUS WITH COMPLICATION, WITH LONG-TERM CURRENT USE OF INSULIN: ICD-10-CM

## 2018-09-12 DIAGNOSIS — IMO0002 UNCONTROLLED TYPE 2 DIABETES MELLITUS WITH BACKGROUND RETINOPATHY: Primary | ICD-10-CM

## 2018-09-12 DIAGNOSIS — Z79.4 ENCOUNTER FOR LONG-TERM (CURRENT) USE OF INSULIN (HCC): ICD-10-CM

## 2018-09-12 DIAGNOSIS — E03.9 HYPOTHYROIDISM, UNSPECIFIED TYPE: ICD-10-CM

## 2018-09-12 DIAGNOSIS — E16.1 HYPERINSULINISM: ICD-10-CM

## 2018-09-12 PROCEDURE — 99214 OFFICE O/P EST MOD 30 MIN: CPT | Performed by: INTERNAL MEDICINE

## 2018-09-12 RX ORDER — LEVOTHYROXINE SODIUM 50 MCG
50 TABLET ORAL DAILY
Qty: 30 TABLET | Refills: 6 | Status: SHIPPED | OUTPATIENT
Start: 2018-09-12

## 2018-09-12 NOTE — PROGRESS NOTES
68 y.o.    Patient Care Team:  Duncan Domingo MD as PCP - General (Internal Medicine)  Duncan Domingo MD as PCP - Claims Attributed  Axel Lobato MD as Consulting Physician (Hematology and Oncology)  Chika Ospina MD as Referring Physician (General Surgery)    Chief Complaint:      F/U TYPE 2 DIABETES, UNCONTROLLED.  HYPOTHYROIDISM.  NO RECENT LABS.  Subjective   HPI  Patient is a 68-year-old white female with a past history of uncontrolled type 2 diabetes mellitus with complications came for follow-up  She's a former patient of Dr. Tam    Uncontrolled type 2 diabetes mellitus  Patient is currently taking tresiba 22 units daily along with NovoLog 1 unit for every 10 carbohydrates  Her glucose log reveals that majority of the blood sugars are in the 100 range with an occasional 200 and also occasionally below 80  Hypoglycemia  Patient has very few episodes of hypoglycemia but does feels symptomatic with the blood sugars are less than 100  Uncontrolled type 2 diabetes mellitus with neuropathy  Patient is diagnosed with diabetic gastroparesis  Uncontrolled type 2 diabetes mellitus with background diabetic retinopathy  Patient has regular follow-up with ophthalmologist  Patient denies any knowledge of diabetic nephropathy  Postmenopausal osteoporosis  Patient is currently taking prolia since May 2017  She denies any side effects on the medication    She has not been checking very frequently  Patient's hemoglobin A1c is 4.9 but her blood sugars are usually in the 100-200 range  She's not been intimate significantly and has no kidney problems  She did not receive any blood transfusions  It is rather puzzling that her hemoglobin A1c is so low when the blood sugars are not corresponding to 4.9%   they would be more appropriately in the 7% range      Diabetes   She has type 2 diabetes mellitus. MedicAlert identification noted. The initial diagnosis of diabetes was made 21 years ago. Hypoglycemia symptoms  include dizziness, hunger and sleepiness. Pertinent negatives for hypoglycemia include no confusion, headaches, mood changes, nervousness/anxiousness, pallor, seizures, speech difficulty, sweats or tremors. Associated symptoms include fatigue and weakness. Pertinent negatives for diabetes include no blurred vision, no chest pain, no foot paresthesias, no foot ulcerations, no polydipsia, no polyphagia, no polyuria, no visual change and no weight loss. Hypoglycemia complications include nocturnal hypoglycemia. Pertinent negatives for hypoglycemia complications include no blackouts, no hospitalization, no required assistance and no required glucagon injection. Symptoms are stable. Diabetic complications include heart disease. Pertinent negatives for diabetic complications include no CVA, impotence, nephropathy, peripheral neuropathy, PVD or retinopathy. Risk factors for coronary artery disease include dyslipidemia, family history and hypertension. Current diabetic treatment includes insulin injections. She is compliant with treatment all of the time. She is currently taking insulin pre-breakfast, pre-lunch, pre-dinner and at bedtime. Insulin injections are given by patient. Rotation sites for injection include the abdominal wall. Her weight is stable. She is following a generally healthy diet. Meal planning includes avoidance of concentrated sweets and carbohydrate counting. She has not had a previous visit with a dietitian. She participates in exercise three times a week. She monitors blood glucose at home 5+ x per day. She monitors urine at home <1 x per month. Blood glucose monitoring compliance is good. There is no change in her home blood glucose trend. Her breakfast blood glucose is taken after 10 am. Her breakfast blood glucose range is generally 130-140 mg/dl. Her lunch blood glucose is taken between 12-1 pm. Her lunch blood glucose range is generally 130-140 mg/dl. Her dinner blood glucose is taken between 4-5  pm. Her dinner blood glucose range is generally 140-180 mg/dl. Her highest blood glucose is 140-180 mg/dl. Her overall blood glucose range is 130-140 mg/dl. She does not see a podiatrist.Eye exam is current.         Interval History    Summary  Uncontrolled type 2 diabetes mellitus  Patient was diagnosed approximately 18 years ago. She reported that she's taking Levemir 15 units at night and takes NovoLog one unit for every 10 carbohydrates and a correction scale of one unit for every 25 mg over 125.  Patient reported that most of the blood sugars are in the 100-200 range. Occasionally she has a blood sugar 75-90. She keeps adjusting her insulin based on the blood sugars      Uncontrolled type 2 diabetes mellitus with retinopathy.  Patient reports to have had background diabetic retinopathy in the right eye which apparently stable for the past several years.  Patient denied any knowledge of symptoms of diabetic neuropathy or nephropathy.  Patient also denied any coronary artery disease or TIA or stroke.  Patient is physically very active, she plays golf almost daily.      Hypoglycemia  Patient periodically gets iron infusions for anemia and has had episodes of hypoglycemia.  Hyperlipidemia  Patient is currently on Lipitor 80 mg as well as TriCor 145 mg daily. She denies any side effects on the medication.  Hypertension  Patient is currently on Norvasc 2.5 mg daily as well as Tenormin 25 mg daily, Maxzide one tablet daily. Her blood pressure is stable currently.  Patient denied any side effects on the medication  The following portions of the patient's history were reviewed and updated as appropriate: allergies, current medications, past family history, past medical history, past social history, past surgical history and problem list.    Past Medical History:   Diagnosis Date   • Anemia    • Arthritis    • Back pain    • Background diabetic retinopathy of right eye determined by examination (CMS/[facility])    • Coronary  atherosclerosis     cath 1/2014 with 50% mid LAD, otherwise unremarkable   • Diabetes mellitus type 2, insulin dependent (CMS/HCC)    • Gastroparesis due to secondary diabetes (CMS/HCC)    • GERD (gastroesophageal reflux disease)    • History of spinal stenosis    • History of spondylolisthesis    • History of URI (upper respiratory infection)    • Hypercholesteremia    • Hyperlipidemia    • Hypertension    • Iron deficiency anemia    • Left-sided weakness     ARM AND LEG   • Leukocytosis     MILD   • Osteopenia    • Osteoporosis    • Peripheral neuropathy    • Retinopathy    • Vertigo      Family History   Problem Relation Age of Onset   • Polycythemia Mother         Progressed to secondary myelofibrosis   • Cirrhosis Father    • Diabetes Other    • Hypertension Other    • Cancer Neg Hx      Social History     Social History   • Marital status:      Spouse name: Yehuda   • Number of children: N/A   • Years of education: N/A     Occupational History   • 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[employer]   •  Retired     Social History Main Topics   • Smoking status: Never Smoker   • Smokeless tobacco: Never Used   • Alcohol use Yes      Comment: OCCASIONAL   • Drug use: No   • Sexual activity: Defer     Other Topics Concern   • Not on file     Social History Narrative   • No narrative on file     Allergies   Allergen Reactions   • Topamax [Topiramate]        Current Outpatient Prescriptions:   •  ALPRAZolam (XANAX) 0.25 MG tablet, 2 (Two) Times a Day., Disp: , Rfl:   •  amLODIPine (NORVASC) 2.5 MG tablet, Take  by mouth Daily., Disp: , Rfl:   •  aspirin 81 MG EC tablet, Take  by mouth Daily., Disp: , Rfl:   •  atenolol (TENORMIN) 50 MG tablet, Take 1 tablet by mouth Daily., Disp: 30 tablet, Rfl: 6  •  denosumab (PROLIA) 60 MG/ML solution syringe, Inject 60 mg under the skin Every 6 (Six) Months., Disp: , Rfl:   •  fenofibrate (TRICOR) 145 MG tablet, Take  by mouth Daily., Disp: , Rfl:   •  insulin aspart  "(novoLOG FLEXPEN) 100 UNIT/ML solution pen-injector sc pen, Inject 15 Units under the skin 3 (Three) Times a Day With Meals., Disp: 30 mL, Rfl: 2  •  Insulin Degludec (TRESIBA FLEXTOUCH) 200 UNIT/ML solution pen-injector, Inject 30 Units under the skin Daily. (Patient taking differently: Inject 24 Units under the skin Daily.), Disp: 9 pen, Rfl: 3  •  Insulin Pen Needle (BD PEN NEEDLE SOCO U/F) 32G X 4 MM misc, 4 times daily, Disp: 200 each, Rfl: 3  •  levothyroxine (SYNTHROID) 50 MCG tablet, Take 50 mcg by mouth Daily., Disp: , Rfl:   •  meloxicam (MOBIC) 15 MG tablet, Take 15 mg by mouth daily., Disp: , Rfl:   •  nitroglycerin (NITROSTAT) 0.4 MG SL tablet, , Disp: , Rfl:   •  ONE TOUCH ULTRA TEST test strip, USE TO TEST BLOOD SUGAR 8 TIMES DAILY, Disp: 700 each, Rfl: 2  •  ONETOUCH DELICA LANCETS 33G misc, , Disp: , Rfl:   •  ONETOUCH VERIO test strip, TO TEST BLOOD SUGARS 6 TIMES DAILY E11.8, Disp: 550 each, Rfl: 3  •  rosuvastatin (CRESTOR) 40 MG tablet, Pt taking twice weekly, Disp: , Rfl:   •  triamterene-hydrochlorothiazide (MAXZIDE-25) 37.5-25 MG per tablet, Take  by mouth. TAKES ONE HALF TABLET DAILY, Disp: , Rfl:   •  zolpidem (AMBIEN) 10 MG tablet, Take 5 mg by mouth., Disp: , Rfl:         Review of Systems   Constitutional: Positive for fatigue. Negative for weight loss.   Eyes: Negative for blurred vision.   Cardiovascular: Negative for chest pain.   Endocrine: Positive for cold intolerance. Negative for polydipsia, polyphagia and polyuria.   Genitourinary: Negative for impotence.   Skin: Negative for pallor.   Neurological: Positive for dizziness and weakness. Negative for tremors, seizures, speech difficulty and headaches.   Psychiatric/Behavioral: Negative for confusion. The patient is not nervous/anxious.    All other systems reviewed and are negative.      Objective       Vitals:    09/12/18 0956   BP: 122/62   Pulse: 80   Weight: 59.1 kg (130 lb 6.4 oz)   Height: 152.4 cm (60\")     Body mass index " is 25.47 kg/m².      Physical Exam   Constitutional: She is oriented to person, place, and time. She appears well-developed and well-nourished.   HENT:   Head: Normocephalic and atraumatic.   Eyes: Pupils are equal, round, and reactive to light. EOM are normal.   Neck: Normal range of motion. No thyromegaly present.   Cardiovascular: Normal rate, regular rhythm, normal heart sounds and intact distal pulses.    Pulmonary/Chest: Effort normal and breath sounds normal.   Abdominal: Soft. Bowel sounds are normal. She exhibits no distension. There is no tenderness.   Musculoskeletal: Normal range of motion. She exhibits no edema.   Neurological: She is alert and oriented to person, place, and time. She has normal reflexes.   Skin: Skin is warm and dry.   Psychiatric: She has a normal mood and affect. Her behavior is normal.   Nursing note and vitals reviewed.    Results Review:     I reviewed the patient's new clinical results.    Medical records reviewed  Summary:      Lab on 08/31/2018   Component Date Value Ref Range Status   • Glucose 08/31/2018 156* 74 - 124 mg/dL Final   • BUN 08/31/2018 29* 6 - 20 mg/dL Final   • Creatinine 08/31/2018 1.05  0.60 - 1.10 mg/dL Final   • Sodium 08/31/2018 139  134 - 145 mmol/L Final   • Potassium 08/31/2018 4.5  3.5 - 4.7 mmol/L Final   • Chloride 08/31/2018 103  98 - 107 mmol/L Final   • CO2 08/31/2018 27.0  22.0 - 29.0 mmol/L Final   • Calcium 08/31/2018 9.5  8.5 - 10.2 mg/dL Final   • Total Protein 08/31/2018 6.6  6.3 - 8.0 g/dL Final   • Albumin 08/31/2018 4.10  3.50 - 5.20 g/dL Final   • ALT (SGPT) 08/31/2018 15  0 - 33 U/L Final   • AST (SGOT) 08/31/2018 16  0 - 32 U/L Final   • Alkaline Phosphatase 08/31/2018 32* 38 - 116 U/L Final   • Total Bilirubin 08/31/2018 0.4  0.1 - 1.2 mg/dL Final   • eGFR Non African Amer 08/31/2018 52* >60 mL/min/1.73 Final   • Globulin 08/31/2018 2.5  1.8 - 3.5 gm/dL Final   • A/G Ratio 08/31/2018 1.6  1.1 - 2.4 g/dL Final   • BUN/Creatinine Ratio  08/31/2018 27.6  7.3 - 30.0 Final   • Anion Gap 08/31/2018 9.0  mmol/L Final   • Ferritin 08/31/2018 75.90  ng/mL Final   • Iron 08/31/2018 62  37 - 145 mcg/dL Final   • Iron Saturation 08/31/2018 15  14 - 48 % Final   • Transferrin 08/31/2018 305  200 - 360 mg/dL Final   • TIBC 08/31/2018 427  249 - 505 mcg/dL Final   • Immature Reticulocyte Fraction 08/31/2018 12.4  3.0 - 15.8 % Final   • Reticulocyte % 08/31/2018 1.84  0.60 - 2.00 % Final   • Reticulocyte Hgb 08/31/2018 31.4  29.8 - 36.1 pg Final   • Vitamin B-12 08/31/2018 383  211 - 946 pg/mL Final   • WBC 08/31/2018 6.99  4.00 - 10.00 10*3/mm3 Final   • RBC 08/31/2018 4.34  3.90 - 5.00 10*6/mm3 Final   • Hemoglobin 08/31/2018 12.1  11.5 - 14.9 g/dL Final   • Hematocrit 08/31/2018 37.6  34.0 - 45.0 % Final   • MCV 08/31/2018 86.6  83.0 - 97.0 fL Final   • MCH 08/31/2018 27.9  27.0 - 33.0 pg Final   • MCHC 08/31/2018 32.2  32.0 - 35.0 g/dL Final   • RDW 08/31/2018 14.0  11.7 - 14.5 % Final   • RDW-SD 08/31/2018 44.5  37.0 - 49.0 fl Final   • MPV 08/31/2018 11.0  8.9 - 12.1 fL Final   • Platelets 08/31/2018 234  150 - 375 10*3/mm3 Final   • Neutrophil % 08/31/2018 66.0  39.0 - 75.0 % Final   • Lymphocyte % 08/31/2018 17.2* 20.0 - 49.0 % Final   • Monocyte % 08/31/2018 6.7  4.0 - 12.0 % Final   • Eosinophil % 08/31/2018 9.0* 1.0 - 5.0 % Final   • Basophil % 08/31/2018 0.7  0.0 - 1.1 % Final   • Immature Grans % 08/31/2018 0.4  0.0 - 0.5 % Final   • Neutrophils, Absolute 08/31/2018 4.61  1.50 - 7.00 10*3/mm3 Final   • Lymphocytes, Absolute 08/31/2018 1.20  1.00 - 3.50 10*3/mm3 Final   • Monocytes, Absolute 08/31/2018 0.47  0.25 - 0.80 10*3/mm3 Final   • Eosinophils, Absolute 08/31/2018 0.63* 0.00 - 0.36 10*3/mm3 Final   • Basophils, Absolute 08/31/2018 0.05  0.00 - 0.10 10*3/mm3 Final   • Immature Grans, Absolute 08/31/2018 0.03  0.00 - 0.03 10*3/mm3 Final   • nRBC 08/31/2018 0.0  0.0 - 0.0 /100 WBC Final     Lab Results   Component Value Date    HGBA1C 4.90  05/18/2018    HGBA1C 5.30 02/15/2018    HGBA1C 6.30 (H) 07/07/2017     Lab Results   Component Value Date    MICROALBUR <3.0 07/07/2017    CREATININE 1.05 08/31/2018     Imaging Results (most recent)     None                Assessment and Plan:    Shirin was seen today for diabetes.    Diagnoses and all orders for this visit:    Uncontrolled type 2 diabetes mellitus with background retinopathy (CMS/[facility])  -     Comprehensive Metabolic Panel  -     Hemoglobin A1c  -     T4, Free  -     TSH  -     Microalbumin / Creatinine Urine Ratio - Urine, Clean Catch  -     Fructosamine    Hypothyroidism, unspecified type  -     Comprehensive Metabolic Panel  -     Hemoglobin A1c  -     T4, Free  -     TSH  -     Microalbumin / Creatinine Urine Ratio - Urine, Clean Catch    Uncontrolled type 2 diabetes mellitus with complication, with long-term current use of insulin (CMS/[facility])    Hyperinsulinism    Encounter for long-term (current) use of insulin (CMS/[facility])    Postmenopausal osteoporosis    Other orders  -     SYNTHROID 50 MCG tablet; Take 1 tablet by mouth Daily.  -     Insulin NPH, Human,, Isophane, (HUMULIN N KWIKPEN) 100 UNIT/ML suspension pen-injector; TAKE 15 UNITS TWICE DAILY  -     Fructosamine  -     Unable To Void    Patient's glucose log reviewed  Majority of the blood sugars are in the 100-200 range  She occasionally has an 81 number  Patient's hemoglobin A1c is even lower than before at 4.7%  It is rather surprising that her hemoglobin A1c is so low when the blood sugars of 100 200 will correspond to an A1c of 7% or higher    Patient did not receive any blood transfusions recently  Her hemoglobin level is stable in the 12 range and she is not anemic  Patient's glucometer could be old and not reading adequately    I advised the patient to consider dexcom  She is currently taking Accu-Cheks 4 times daily and she takes 4 injections daily  She should be eligible for dexcom for Medicare    Patient will get lab testing  "done today  After the lab results have been reviewed she'll be notified of any further changes in medication  Patient will return to follow-up in 3-4 months.    The total time spent  was more than 25 min of which greater than 15 min of time ( greater than 50% of the total time )  was spent face to face with the patient counseling and coordination of care on recommended evaluation and treatment options, instructions for management/treatment and /or follow up  and importance of compliance with chosen management or treatment options    Frank Allen MD. FACE    09/12/18      EMR Dragon / transcription disclaimer:     \"Dictated utilizing Dragon dictation\".         "

## 2018-09-13 LAB
ALBUMIN SERPL-MCNC: 4.7 G/DL (ref 3.5–5.2)
ALBUMIN/GLOB SERPL: 2.2 G/DL
ALP SERPL-CCNC: 37 U/L (ref 39–117)
ALT SERPL-CCNC: 18 U/L (ref 1–33)
AST SERPL-CCNC: 16 U/L (ref 1–32)
BILIRUB SERPL-MCNC: 0.5 MG/DL (ref 0.1–1.2)
BUN SERPL-MCNC: 30 MG/DL (ref 8–23)
BUN/CREAT SERPL: 29.4 (ref 7–25)
CALCIUM SERPL-MCNC: 9.8 MG/DL (ref 8.6–10.5)
CHLORIDE SERPL-SCNC: 104 MMOL/L (ref 98–107)
CO2 SERPL-SCNC: 28.4 MMOL/L (ref 22–29)
CREAT SERPL-MCNC: 1.02 MG/DL (ref 0.57–1)
FRUCTOSAMINE SERPL-SCNC: 220 UMOL/L (ref 0–285)
GLOBULIN SER CALC-MCNC: 2.1 GM/DL
GLUCOSE SERPL-MCNC: 129 MG/DL (ref 65–99)
HBA1C MFR BLD: 6.15 % (ref 4.8–5.6)
POTASSIUM SERPL-SCNC: 5 MMOL/L (ref 3.5–5.2)
PROT SERPL-MCNC: 6.8 G/DL (ref 6–8.5)
SODIUM SERPL-SCNC: 141 MMOL/L (ref 136–145)
T4 FREE SERPL-MCNC: 1.6 NG/DL (ref 0.93–1.7)
TSH SERPL DL<=0.005 MIU/L-ACNC: 2.25 MIU/ML (ref 0.27–4.2)
UNABLE TO VOID: NORMAL

## 2018-09-17 RX ORDER — INSULIN DEGLUDEC 200 U/ML
30 INJECTION, SOLUTION SUBCUTANEOUS DAILY
Qty: 9 ML | Refills: 2 | Status: SHIPPED | OUTPATIENT
Start: 2018-09-17 | End: 2019-11-09 | Stop reason: SDUPTHER

## 2018-09-17 NOTE — TELEPHONE ENCOUNTER
----- Message from Honey Bermudez MA sent at 9/17/2018  2:48 PM EDT -----  Pt called stating that she is wanting to stay on the novolog instead of the humulin n.. She states that she was unable to obtain it in pen form    CALLED PATIENT SHE WOULD LIKE TO  STAY ON HER NOVOLOG FOR NOW DUE TO HUMULIN N NOT BEING COVERED BY INSURANCE IN PEN FORM.

## 2018-11-26 ENCOUNTER — INFUSION (OUTPATIENT)
Dept: ONCOLOGY | Facility: HOSPITAL | Age: 68
End: 2018-11-26

## 2018-11-26 VITALS
TEMPERATURE: 98.3 F | WEIGHT: 132 LBS | RESPIRATION RATE: 16 BRPM | OXYGEN SATURATION: 96 % | HEART RATE: 83 BPM | DIASTOLIC BLOOD PRESSURE: 75 MMHG | HEIGHT: 60 IN | BODY MASS INDEX: 25.91 KG/M2 | SYSTOLIC BLOOD PRESSURE: 162 MMHG

## 2018-11-26 DIAGNOSIS — M81.0 OSTEOPOROSIS, POST-MENOPAUSAL: Primary | ICD-10-CM

## 2018-11-26 DIAGNOSIS — M81.0 OSTEOPOROSIS, UNSPECIFIED OSTEOPOROSIS TYPE, UNSPECIFIED PATHOLOGICAL FRACTURE PRESENCE: ICD-10-CM

## 2018-11-26 PROCEDURE — 96372 THER/PROPH/DIAG INJ SC/IM: CPT | Performed by: NURSE PRACTITIONER

## 2018-11-26 PROCEDURE — 25010000002 DENOSUMAB 60 MG/ML SOLUTION: Performed by: INTERNAL MEDICINE

## 2018-11-26 RX ADMIN — DENOSUMAB 60 MG: 60 INJECTION SUBCUTANEOUS at 14:25

## 2018-11-26 NOTE — PROGRESS NOTES
"Arrived for subsequent Prolia; reports no concerns with previous. Medication list reviewed; noted no calcium/ and or vitamin d and noted that takes aspirin daily. She will discuss this with Dr. Baeza \"at next visit in 3 weeks\". Injection given without incident.  "

## 2018-11-30 ENCOUNTER — TELEPHONE (OUTPATIENT)
Dept: ENDOCRINOLOGY | Age: 68
End: 2018-11-30

## 2018-11-30 NOTE — TELEPHONE ENCOUNTER
----- Message from Portia Guidry sent at 11/30/2018  9:31 AM EST -----  Contact: patient  Patient said she is out of Novolog pen and last took yesterday at 3pm. She said she called yesterday asking for 1 Novolog pen sample to hold her over until January.  Cost to her is $400 and she is in [facility].  She said [facility] had also suggested that she call to see if you had coupons that could help her when she had asked if she could get it cheaper. .     Pt cell - [phone]    LEFT SAMPLE OF HUMALOG FOR PATIENT, SHE HAS BEEN NOTIFIED

## 2018-12-03 ENCOUNTER — APPOINTMENT (OUTPATIENT)
Dept: WOMENS IMAGING | Facility: HOSPITAL | Age: 68
End: 2018-12-03

## 2018-12-03 PROCEDURE — 77063 BREAST TOMOSYNTHESIS BI: CPT | Performed by: RADIOLOGY

## 2018-12-03 PROCEDURE — 77067 SCR MAMMO BI INCL CAD: CPT | Performed by: RADIOLOGY

## 2018-12-14 ENCOUNTER — LAB (OUTPATIENT)
Dept: ENDOCRINOLOGY | Age: 68
End: 2018-12-14

## 2018-12-14 DIAGNOSIS — IMO0002 UNCONTROLLED TYPE 2 DIABETES MELLITUS WITH BACKGROUND RETINOPATHY: Primary | ICD-10-CM

## 2018-12-14 DIAGNOSIS — E03.9 HYPOTHYROIDISM, UNSPECIFIED TYPE: ICD-10-CM

## 2018-12-14 DIAGNOSIS — IMO0002 UNCONTROLLED TYPE 2 DIABETES MELLITUS WITH BACKGROUND RETINOPATHY: ICD-10-CM

## 2018-12-15 LAB
ALBUMIN SERPL-MCNC: 4.2 G/DL (ref 3.5–5.2)
ALBUMIN/CREAT UR: 23.8 MG/G CREAT (ref 0–30)
ALBUMIN/GLOB SERPL: 2 G/DL
ALP SERPL-CCNC: 33 U/L (ref 39–117)
ALT SERPL-CCNC: 17 U/L (ref 1–33)
AST SERPL-CCNC: 18 U/L (ref 1–32)
BILIRUB SERPL-MCNC: 0.5 MG/DL (ref 0.1–1.2)
BUN SERPL-MCNC: 27 MG/DL (ref 8–23)
BUN/CREAT SERPL: 24.8 (ref 7–25)
CALCIUM SERPL-MCNC: 9.8 MG/DL (ref 8.6–10.5)
CHLORIDE SERPL-SCNC: 103 MMOL/L (ref 98–107)
CO2 SERPL-SCNC: 24.9 MMOL/L (ref 22–29)
CREAT SERPL-MCNC: 1.09 MG/DL (ref 0.57–1)
CREAT UR-MCNC: 307.6 MG/DL
FRUCTOSAMINE SERPL-SCNC: 216 UMOL/L (ref 0–285)
GLOBULIN SER CALC-MCNC: 2.1 GM/DL
GLUCOSE SERPL-MCNC: 110 MG/DL (ref 65–99)
HBA1C MFR BLD: 5.97 % (ref 4.8–5.6)
MICROALBUMIN UR-MCNC: 73.2 UG/ML
POTASSIUM SERPL-SCNC: 3.9 MMOL/L (ref 3.5–5.2)
PROT SERPL-MCNC: 6.3 G/DL (ref 6–8.5)
SODIUM SERPL-SCNC: 141 MMOL/L (ref 136–145)
T4 FREE SERPL-MCNC: 1.55 NG/DL (ref 0.93–1.7)
TSH SERPL DL<=0.005 MIU/L-ACNC: 1.41 MIU/ML (ref 0.27–4.2)

## 2018-12-27 ENCOUNTER — LAB (OUTPATIENT)
Dept: LAB | Facility: HOSPITAL | Age: 68
End: 2018-12-27

## 2018-12-27 DIAGNOSIS — D50.9 IRON DEFICIENCY ANEMIA, UNSPECIFIED IRON DEFICIENCY ANEMIA TYPE: ICD-10-CM

## 2018-12-27 LAB
ALBUMIN SERPL-MCNC: 4.1 G/DL (ref 3.5–5.2)
ALBUMIN/GLOB SERPL: 1.8 G/DL (ref 1.1–2.4)
ALP SERPL-CCNC: 36 U/L (ref 38–116)
ALT SERPL W P-5'-P-CCNC: 17 U/L (ref 0–33)
ANION GAP SERPL CALCULATED.3IONS-SCNC: 11.2 MMOL/L
AST SERPL-CCNC: 16 U/L (ref 0–32)
BASOPHILS # BLD AUTO: 0.06 10*3/MM3 (ref 0–0.1)
BASOPHILS NFR BLD AUTO: 0.9 % (ref 0–1.1)
BILIRUB SERPL-MCNC: 0.3 MG/DL (ref 0.1–1.2)
BUN BLD-MCNC: 30 MG/DL (ref 6–20)
BUN/CREAT SERPL: 29.7 (ref 7.3–30)
CALCIUM SPEC-SCNC: 10.1 MG/DL (ref 8.5–10.2)
CHLORIDE SERPL-SCNC: 104 MMOL/L (ref 98–107)
CO2 SERPL-SCNC: 26.8 MMOL/L (ref 22–29)
CREAT BLD-MCNC: 1.01 MG/DL (ref 0.6–1.1)
DEPRECATED RDW RBC AUTO: 42.5 FL (ref 37–49)
EOSINOPHIL # BLD AUTO: 0.68 10*3/MM3 (ref 0–0.36)
EOSINOPHIL NFR BLD AUTO: 10.7 % (ref 1–5)
ERYTHROCYTE [DISTWIDTH] IN BLOOD BY AUTOMATED COUNT: 13.8 % (ref 11.7–14.5)
FERRITIN SERPL-MCNC: 24.1 NG/ML
GFR SERPL CREATININE-BSD FRML MDRD: 54 ML/MIN/1.73
GLOBULIN UR ELPH-MCNC: 2.3 GM/DL (ref 1.8–3.5)
GLUCOSE BLD-MCNC: 176 MG/DL (ref 74–124)
HCT VFR BLD AUTO: 37.6 % (ref 34–45)
HGB BLD-MCNC: 12.1 G/DL (ref 11.5–14.9)
HGB RETIC QN AUTO: 29.5 PG (ref 29.8–36.1)
IMM GRANULOCYTES # BLD AUTO: 0.04 10*3/MM3 (ref 0–0.03)
IMM GRANULOCYTES NFR BLD AUTO: 0.6 % (ref 0–0.5)
IMM RETICS NFR: 16.2 % (ref 3–15.8)
IRON 24H UR-MRATE: 64 MCG/DL (ref 37–145)
IRON SATN MFR SERPL: 14 % (ref 14–48)
LYMPHOCYTES # BLD AUTO: 1.45 10*3/MM3 (ref 1–3.5)
LYMPHOCYTES NFR BLD AUTO: 22.9 % (ref 20–49)
MCH RBC QN AUTO: 27.1 PG (ref 27–33)
MCHC RBC AUTO-ENTMCNC: 32.2 G/DL (ref 32–35)
MCV RBC AUTO: 84.3 FL (ref 83–97)
MONOCYTES # BLD AUTO: 0.48 10*3/MM3 (ref 0.25–0.8)
MONOCYTES NFR BLD AUTO: 7.6 % (ref 4–12)
NEUTROPHILS # BLD AUTO: 3.63 10*3/MM3 (ref 1.5–7)
NEUTROPHILS NFR BLD AUTO: 57.3 % (ref 39–75)
NRBC BLD AUTO-RTO: 0 /100 WBC (ref 0–0)
PLATELET # BLD AUTO: 203 10*3/MM3 (ref 150–375)
PMV BLD AUTO: 11.8 FL (ref 8.9–12.1)
POTASSIUM BLD-SCNC: 4.1 MMOL/L (ref 3.5–4.7)
PROT SERPL-MCNC: 6.4 G/DL (ref 6.3–8)
RBC # BLD AUTO: 4.46 10*6/MM3 (ref 3.9–5)
RETICS/RBC NFR AUTO: 1.47 % (ref 0.6–2)
SODIUM BLD-SCNC: 142 MMOL/L (ref 134–145)
TIBC SERPL-MCNC: 465 MCG/DL (ref 249–505)
TRANSFERRIN SERPL-MCNC: 332 MG/DL (ref 200–360)
VIT B12 BLD-MCNC: 498 PG/ML (ref 211–946)
WBC NRBC COR # BLD: 6.34 10*3/MM3 (ref 4–10)

## 2018-12-27 PROCEDURE — 80053 COMPREHEN METABOLIC PANEL: CPT

## 2018-12-27 PROCEDURE — 83540 ASSAY OF IRON: CPT

## 2018-12-27 PROCEDURE — 85025 COMPLETE CBC W/AUTO DIFF WBC: CPT

## 2018-12-27 PROCEDURE — 36415 COLL VENOUS BLD VENIPUNCTURE: CPT | Performed by: INTERNAL MEDICINE

## 2018-12-27 PROCEDURE — 82728 ASSAY OF FERRITIN: CPT

## 2018-12-27 PROCEDURE — 85046 RETICYTE/HGB CONCENTRATE: CPT

## 2018-12-27 PROCEDURE — 82607 VITAMIN B-12: CPT | Performed by: INTERNAL MEDICINE

## 2018-12-27 PROCEDURE — 84466 ASSAY OF TRANSFERRIN: CPT

## 2018-12-28 ENCOUNTER — OFFICE VISIT (OUTPATIENT)
Dept: ENDOCRINOLOGY | Age: 68
End: 2018-12-28

## 2018-12-28 VITALS
HEIGHT: 60 IN | HEART RATE: 89 BPM | DIASTOLIC BLOOD PRESSURE: 70 MMHG | SYSTOLIC BLOOD PRESSURE: 134 MMHG | WEIGHT: 130.8 LBS | BODY MASS INDEX: 25.68 KG/M2

## 2018-12-28 DIAGNOSIS — Z79.4 ENCOUNTER FOR LONG-TERM (CURRENT) USE OF INSULIN (HCC): ICD-10-CM

## 2018-12-28 DIAGNOSIS — E78.5 HYPERLIPIDEMIA ASSOCIATED WITH TYPE 2 DIABETES MELLITUS (HCC): ICD-10-CM

## 2018-12-28 DIAGNOSIS — E16.1 HYPERINSULINISM: ICD-10-CM

## 2018-12-28 DIAGNOSIS — E11.69 HYPERLIPIDEMIA ASSOCIATED WITH TYPE 2 DIABETES MELLITUS (HCC): ICD-10-CM

## 2018-12-28 DIAGNOSIS — E03.9 HYPOTHYROIDISM, UNSPECIFIED TYPE: ICD-10-CM

## 2018-12-28 DIAGNOSIS — IMO0002 UNCONTROLLED TYPE 2 DIABETES MELLITUS WITH BACKGROUND RETINOPATHY: ICD-10-CM

## 2018-12-28 DIAGNOSIS — M81.0 POSTMENOPAUSAL OSTEOPOROSIS: ICD-10-CM

## 2018-12-28 DIAGNOSIS — IMO0002 DIABETES MELLITUS TYPE 2, UNCONTROLLED, WITH COMPLICATIONS: Primary | ICD-10-CM

## 2018-12-28 PROCEDURE — 95251 CONT GLUC MNTR ANALYSIS I&R: CPT | Performed by: INTERNAL MEDICINE

## 2018-12-28 PROCEDURE — 99214 OFFICE O/P EST MOD 30 MIN: CPT | Performed by: INTERNAL MEDICINE

## 2018-12-31 ENCOUNTER — OFFICE VISIT (OUTPATIENT)
Dept: ONCOLOGY | Facility: CLINIC | Age: 68
End: 2018-12-31

## 2018-12-31 ENCOUNTER — APPOINTMENT (OUTPATIENT)
Dept: LAB | Facility: HOSPITAL | Age: 68
End: 2018-12-31

## 2018-12-31 VITALS
HEART RATE: 85 BPM | WEIGHT: 131.4 LBS | TEMPERATURE: 98.3 F | SYSTOLIC BLOOD PRESSURE: 160 MMHG | OXYGEN SATURATION: 96 % | HEIGHT: 60 IN | BODY MASS INDEX: 25.8 KG/M2 | RESPIRATION RATE: 18 BRPM | DIASTOLIC BLOOD PRESSURE: 78 MMHG

## 2018-12-31 DIAGNOSIS — D50.9 IRON DEFICIENCY ANEMIA, UNSPECIFIED IRON DEFICIENCY ANEMIA TYPE: Primary | ICD-10-CM

## 2018-12-31 PROCEDURE — 99214 OFFICE O/P EST MOD 30 MIN: CPT | Performed by: INTERNAL MEDICINE

## 2018-12-31 PROCEDURE — G0463 HOSPITAL OUTPT CLINIC VISIT: HCPCS | Performed by: INTERNAL MEDICINE

## 2018-12-31 RX ORDER — SODIUM CHLORIDE 9 MG/ML
250 INJECTION, SOLUTION INTRAVENOUS ONCE
Status: CANCELLED | OUTPATIENT
Start: 2019-01-04

## 2018-12-31 RX ORDER — FAMOTIDINE 10 MG/ML
20 INJECTION, SOLUTION INTRAVENOUS ONCE
Status: CANCELLED | OUTPATIENT
Start: 2019-01-04

## 2018-12-31 NOTE — PROGRESS NOTES
"[facility] OUTPATIENT FOLLOW UP CLINIC VISIT    REASON FOR FOLLOW-UP:    1.  Iron deficiency anemia.  Her last intravenous Feraheme infusions or in October 2015.    HISTORY OF PRESENT ILLNESS:  Shirin Washington is a 68 y.o. female who returns today for follow up of the above issue.    She has generally been feeling well but over the past few weeks has had some more fatigue.  She continues to have abdominal bloating related to gastroparesis.  She denies any bleeding.  She occasionally bruises on her abdomen.      PAST MEDICAL, SURGICAL, FAMILY, AND SOCIAL HISTORIES were reviewed with the patient and in the electronic medical record, and were updated if indicated.    ALLERGIES:  Allergies   Allergen Reactions   • Topamax [Topiramate]        MEDICATIONS:  The medication list has been reviewed with the patient by the medical assistant, and the list has been updated in the electronic medical record, which I reviewed.  Medication dosages and frequencies were confirmed to be accurate.    REVIEW OF SYSTEMS:  PAIN:  See Vital Signs below.  GENERAL:  No fevers, chills, night sweats, or unintended weight loss.  Fatigue.    SKIN:  No rashes or non-healing lesions.  HEME/LYMPH:  No abnormal bleeding.  No palpable lymphadenopathy.  EYES:  No vision changes or diplopia.  ENT:  Vertigo which has improved  RESPIRATORY:  No cough, shortness of breath, hemoptysis, or wheezing.  CARDIOVASCULAR:  No chest pain or shortness of breath  GASTROINTESTINAL:  Abdominal bloating   GENITOURINARY:  No dysuria or hematuria.  MUSCULOSKELETAL: Chronic back pain which has improved  NEUROLOGIC:  No dizziness, loss of consciousness, or seizures.  PSYCHIATRIC:  No depression, anxiety, or mood changes.    Vitals:    12/31/18 1258   BP: 160/78   Pulse: 85   Resp: 18   Temp: 98.3 °F (36.8 °C)   TempSrc: Oral   SpO2: 96%   Weight: 59.6 kg (131 lb 6.4 oz)   Height: 152.4 cm (60\")   PainSc: 0-No pain  Comment: anemia       PHYSICAL " EXAMINATION:  GENERAL:  Well-developed well-nourished female; awake, alert and oriented, in no acute distress.  SKIN:  Warm and dry, without rashes, purpura, or petechiae.  HEAD:  Normocephalic, atraumatic.  EYES:  Pupils equal, round and reactive to light.  Extraocular movements intact.  Conjunctivae normal.  EARS:  Hearing intact.  NOSE:  Septum midline.  No excoriations or nasal discharge.  MOUTH:  No stomatitis or ulcers.  Lips are normal.  THROAT:  Oropharynx without lesions or exudates.  NECK:  Supple with good range of motion; no thyromegaly or masses; no JVD or bruits.  LYMPHATICS:  No cervical, supraclavicular, axillary, or inguinal lymphadenopathy.  CHEST:  Lungs are clear to auscultation bilaterally.  No wheezes, rales, or rhonchi.  HEART:  Regular rate; normal rhythm.  No murmurs, gallops or rubs.  ABDOMEN:  Soft, nontender, normal active bowel sounds, nondistended  EXTREMITIES:  No clubbing, cyanosis, or edema.  NEUROLOGICAL:  No focal neurologic deficits.    DIAGNOSTIC DATA:  Results for orders placed or performed in visit on 12/27/18   Comprehensive Metabolic Panel   Result Value Ref Range    Glucose 176 (H) 74 - 124 mg/dL    BUN 30 (H) 6 - 20 mg/dL    Creatinine 1.01 0.60 - 1.10 mg/dL    Sodium 142 134 - 145 mmol/L    Potassium 4.1 3.5 - 4.7 mmol/L    Chloride 104 98 - 107 mmol/L    CO2 26.8 22.0 - 29.0 mmol/L    Calcium 10.1 8.5 - 10.2 mg/dL    Total Protein 6.4 6.3 - 8.0 g/dL    Albumin 4.10 3.50 - 5.20 g/dL    ALT (SGPT) 17 0 - 33 U/L    AST (SGOT) 16 0 - 32 U/L    Alkaline Phosphatase 36 (L) 38 - 116 U/L    Total Bilirubin 0.3 0.1 - 1.2 mg/dL    eGFR Non African Amer 54 (L) >60 mL/min/1.73    Globulin 2.3 1.8 - 3.5 gm/dL    A/G Ratio 1.8 1.1 - 2.4 g/dL    BUN/Creatinine Ratio 29.7 7.3 - 30.0    Anion Gap 11.2 mmol/L   Ferritin   Result Value Ref Range    Ferritin 24.10 ng/mL   Iron Profile   Result Value Ref Range    Iron 64 37 - 145 mcg/dL    Iron Saturation 14 14 - 48 %    Transferrin 332 200 -  360 mg/dL    TIBC 465 249 - 505 mcg/dL   Retic With IRF & RET-He   Result Value Ref Range    Immature Reticulocyte Fraction 16.2 (H) 3.0 - 15.8 %    Reticulocyte % 1.47 0.60 - 2.00 %    Reticulocyte Hgb 29.5 (L) 29.8 - 36.1 pg   Vitamin B12   Result Value Ref Range    Vitamin B-12 498 211 - 946 pg/mL   CBC Auto Differential   Result Value Ref Range    WBC 6.34 4.00 - 10.00 10*3/mm3    RBC 4.46 3.90 - 5.00 10*6/mm3    Hemoglobin 12.1 11.5 - 14.9 g/dL    Hematocrit 37.6 34.0 - 45.0 %    MCV 84.3 83.0 - 97.0 fL    MCH 27.1 27.0 - 33.0 pg    MCHC 32.2 32.0 - 35.0 g/dL    RDW 13.8 11.7 - 14.5 %    RDW-SD 42.5 37.0 - 49.0 fl    MPV 11.8 8.9 - 12.1 fL    Platelets 203 150 - 375 10*3/mm3    Neutrophil % 57.3 39.0 - 75.0 %    Lymphocyte % 22.9 20.0 - 49.0 %    Monocyte % 7.6 4.0 - 12.0 %    Eosinophil % 10.7 (H) 1.0 - 5.0 %    Basophil % 0.9 0.0 - 1.1 %    Immature Grans % 0.6 (H) 0.0 - 0.5 %    Neutrophils, Absolute 3.63 1.50 - 7.00 10*3/mm3    Lymphocytes, Absolute 1.45 1.00 - 3.50 10*3/mm3    Monocytes, Absolute 0.48 0.25 - 0.80 10*3/mm3    Eosinophils, Absolute 0.68 (H) 0.00 - 0.36 10*3/mm3    Basophils, Absolute 0.06 0.00 - 0.10 10*3/mm3    Immature Grans, Absolute 0.04 (H) 0.00 - 0.03 10*3/mm3    nRBC 0.0 0.0 - 0.0 /100 WBC         ASSESSMENT:  This is a 68 y.o. female with:  1.  History of iron deficiency anemia: She required intravenous Feraheme last in October 2015 and we gave 2 more doses in September and October 2017.  No obvious GI blood loss.  I suspect she has some malabsorption.  She has symptoms consistent with gastroparesis.  She also received vitamin B12 injections in the past and she received 2 injections with her Feraheme infusions.  She is taking oral vitamin B12 about 3 days per week.  Her hemoglobin and iron studies are normal at this point but her ferritin has dropped considerably and I'm concerned that if she does not receive another iron infusion now she will become iron deficient in the near  future.  Therefore, we will proceed with one dose of Injectafer.    2.  Fatigue: Improved on a thyroid supplement  3.  Gastroparesis      PLAN:  1.  One dose of Injectafer will be scheduled  2.  I will see her back in 4 months for follow-up with labs in 1 week prior.  2.  She will continue her oral vitamin B12 supplement several days per week.

## 2019-01-04 ENCOUNTER — INFUSION (OUTPATIENT)
Dept: ONCOLOGY | Facility: HOSPITAL | Age: 69
End: 2019-01-04

## 2019-01-04 VITALS
DIASTOLIC BLOOD PRESSURE: 72 MMHG | TEMPERATURE: 98.4 F | HEART RATE: 70 BPM | BODY MASS INDEX: 25.82 KG/M2 | SYSTOLIC BLOOD PRESSURE: 135 MMHG | WEIGHT: 132.2 LBS

## 2019-01-04 DIAGNOSIS — D50.9 IRON DEFICIENCY ANEMIA, UNSPECIFIED IRON DEFICIENCY ANEMIA TYPE: ICD-10-CM

## 2019-01-04 DIAGNOSIS — T45.4X5D ADVERSE EFFECT OF IRON, SUBSEQUENT ENCOUNTER: Primary | ICD-10-CM

## 2019-01-04 PROCEDURE — 96375 TX/PRO/DX INJ NEW DRUG ADDON: CPT | Performed by: INTERNAL MEDICINE

## 2019-01-04 PROCEDURE — 96374 THER/PROPH/DIAG INJ IV PUSH: CPT | Performed by: INTERNAL MEDICINE

## 2019-01-04 PROCEDURE — 25010000002 FERRIC CARBOXYMALTOSE 750 MG/15ML SOLUTION 15 ML VIAL: Performed by: INTERNAL MEDICINE

## 2019-01-04 RX ORDER — SODIUM CHLORIDE 9 MG/ML
250 INJECTION, SOLUTION INTRAVENOUS ONCE
Status: COMPLETED | OUTPATIENT
Start: 2019-01-04 | End: 2019-01-04

## 2019-01-04 RX ADMIN — FERRIC CARBOXYMALTOSE INJECTION 750 MG: 50 INJECTION, SOLUTION INTRAVENOUS at 08:25

## 2019-01-04 RX ADMIN — SODIUM CHLORIDE 250 ML: 900 INJECTION, SOLUTION INTRAVENOUS at 08:14

## 2019-01-04 RX ADMIN — FAMOTIDINE 20 MG: 10 INJECTION, SOLUTION INTRAVENOUS at 08:14

## 2019-01-19 PROBLEM — E11.69 HYPERLIPIDEMIA ASSOCIATED WITH TYPE 2 DIABETES MELLITUS: Status: ACTIVE | Noted: 2019-01-19

## 2019-01-19 PROBLEM — E78.5 HYPERLIPIDEMIA ASSOCIATED WITH TYPE 2 DIABETES MELLITUS (HCC): Status: ACTIVE | Noted: 2019-01-19

## 2019-02-27 DIAGNOSIS — IMO0002 UNCONTROLLED TYPE 2 DIABETES MELLITUS WITH COMPLICATION, WITH LONG-TERM CURRENT USE OF INSULIN: ICD-10-CM

## 2019-02-27 RX ORDER — INSULIN ASPART 100 [IU]/ML
INJECTION, SOLUTION INTRAVENOUS; SUBCUTANEOUS
Qty: 30 ML | Refills: 1 | Status: SHIPPED | OUTPATIENT
Start: 2019-02-27 | End: 2019-12-24 | Stop reason: SDUPTHER

## 2019-04-01 ENCOUNTER — OFFICE VISIT (OUTPATIENT)
Dept: INTERNAL MEDICINE | Facility: CLINIC | Age: 69
End: 2019-04-01

## 2019-04-01 VITALS
BODY MASS INDEX: 25.76 KG/M2 | SYSTOLIC BLOOD PRESSURE: 136 MMHG | OXYGEN SATURATION: 99 % | DIASTOLIC BLOOD PRESSURE: 74 MMHG | WEIGHT: 131.2 LBS | TEMPERATURE: 97.7 F | HEART RATE: 69 BPM | HEIGHT: 60 IN

## 2019-04-01 DIAGNOSIS — I10 ESSENTIAL HYPERTENSION: Primary | ICD-10-CM

## 2019-04-01 DIAGNOSIS — E78.5 HYPERLIPIDEMIA, UNSPECIFIED HYPERLIPIDEMIA TYPE: ICD-10-CM

## 2019-04-01 DIAGNOSIS — F51.01 PRIMARY INSOMNIA: ICD-10-CM

## 2019-04-01 DIAGNOSIS — I25.10 ATHEROSCLEROSIS OF NATIVE CORONARY ARTERY OF NATIVE HEART WITHOUT ANGINA PECTORIS: ICD-10-CM

## 2019-04-01 DIAGNOSIS — Z23 NEED FOR VACCINATION FOR PNEUMOCOCCUS: ICD-10-CM

## 2019-04-01 PROBLEM — R42 DIZZINESS: Status: ACTIVE | Noted: 2019-04-01

## 2019-04-01 PROCEDURE — G0439 PPPS, SUBSEQ VISIT: HCPCS | Performed by: FAMILY MEDICINE

## 2019-04-01 PROCEDURE — 99204 OFFICE O/P NEW MOD 45 MIN: CPT | Performed by: FAMILY MEDICINE

## 2019-04-01 PROCEDURE — 90670 PCV13 VACCINE IM: CPT | Performed by: FAMILY MEDICINE

## 2019-04-01 PROCEDURE — G0009 ADMIN PNEUMOCOCCAL VACCINE: HCPCS | Performed by: FAMILY MEDICINE

## 2019-04-01 NOTE — PROGRESS NOTES
Shirin Washington is a 68 y.o. female.      Assessment/Plan   Problem List Items Addressed This Visit        Cardiovascular and Mediastinum    Essential hypertension - Primary    HLD (hyperlipidemia)    Relevant Orders    Lipid Panel    Coronary atherosclerosis    Overview     cath 1/2014 with 50% mid LAD, otherwise unremarkable            Other    Primary insomnia    Overview     Since 2003  Long-term Ambien           Other Visit Diagnoses     Need for vaccination for pneumococcus             45 mintues spent face to face with patient discussing disease pathology progression and counseling  Continue present management of hypertension hyperlipidemia coronary artery disease carotid artery disease diabetes management with hypothyroidism with endocrinologist and anemia followed by hematologist she will have a fasting lipid profile follow-up results administered Prevnar 13 today with Pneumovax 23 in 1 year she will otherwise follow-up as needed or in 3-6 months  Return in about 6 months (around 10/1/2019), or if symptoms worsen or fail to improve, for Recheck, Next scheduled follow up.      Chief Complaint   Patient presents with   • New Patient     former patient of Dr. Domingo   • Dizziness   • Hypertension   • Hyperlipidemia     Medicare Wellness - subsequent   • Insomnia     arthritis     Social History     Tobacco Use   • Smoking status: Never Smoker   • Smokeless tobacco: Never Used   Substance Use Topics   • Alcohol use: Yes     Comment: OCCASIONAL   • Drug use: No       History of Present Illness   New patient visit for Chronic problems, CAD, HTN, Lipids, DM, Amnemia, Osteoporosis, that atre longstanding and well controlled with judicious use of specialsts  The following portions of the patient's history were reviewed and updated as appropriate:PMHroutine: Social history , Allergies, Current Medications, Active Problem List and Health Maintenance    Review of Systems   Constitutional: Negative for activity change,  "appetite change and unexpected weight change.   HENT: Negative for congestion, nosebleeds and trouble swallowing.    Eyes: Negative for pain and visual disturbance.   Respiratory: Negative for chest tightness, shortness of breath and wheezing.    Cardiovascular: Negative for chest pain and palpitations.   Gastrointestinal: Negative for abdominal pain and blood in stool.   Endocrine: Negative.    Genitourinary: Negative for difficulty urinating and hematuria.   Musculoskeletal: Negative for joint swelling.   Skin: Negative for color change and rash.   Allergic/Immunologic: Negative.    Neurological: Negative for syncope and speech difficulty.   Hematological: Negative for adenopathy.   Psychiatric/Behavioral: Positive for sleep disturbance. Negative for agitation and confusion.   All other systems reviewed and are negative.      Objective   Vitals:    04/01/19 0912   BP: 136/74   BP Location: Left arm   Patient Position: Sitting   Cuff Size: Adult   Pulse: 69   Temp: 97.7 °F (36.5 °C)   TempSrc: Oral   SpO2: 99%   Weight: 59.5 kg (131 lb 3.2 oz)   Height: 151.1 cm (59.5\")     Body mass index is 26.06 kg/m².  Physical Exam   Constitutional: She is oriented to person, place, and time. She appears well-developed and well-nourished. No distress.   HENT:   Head: Normocephalic and atraumatic.   Right Ear: External ear normal.   Left Ear: External ear normal.   Mouth/Throat: Oropharynx is clear and moist.   Eyes: Conjunctivae and EOM are normal. Pupils are equal, round, and reactive to light. Right eye exhibits no discharge. Left eye exhibits no discharge. No scleral icterus.   Neck: Normal range of motion. Neck supple. No tracheal deviation present. No thyromegaly present.   Cardiovascular: Normal rate, regular rhythm, normal heart sounds, intact distal pulses and normal pulses. Exam reveals no gallop.   No murmur heard.  Pulmonary/Chest: Effort normal and breath sounds normal. No respiratory distress. She has no wheezes. " She has no rales.   Abdominal: Soft. Bowel sounds are normal. There is no tenderness.   Musculoskeletal: Normal range of motion. She exhibits no edema.   Neurological: She is alert and oriented to person, place, and time. She exhibits normal muscle tone. Coordination normal.   Skin: Skin is warm. No rash noted. No erythema. No pallor.   Psychiatric: She has a normal mood and affect. Her behavior is normal. Judgment and thought content normal.   Nursing note and vitals reviewed.    Reviewed Data:  No visits with results within 1 Month(s) from this visit.   Latest known visit with results is:   Lab on 12/27/2018   Component Date Value Ref Range Status   • Glucose 12/27/2018 176* 74 - 124 mg/dL Final   • BUN 12/27/2018 30* 6 - 20 mg/dL Final   • Creatinine 12/27/2018 1.01  0.60 - 1.10 mg/dL Final   • Sodium 12/27/2018 142  134 - 145 mmol/L Final   • Potassium 12/27/2018 4.1  3.5 - 4.7 mmol/L Final   • Chloride 12/27/2018 104  98 - 107 mmol/L Final   • CO2 12/27/2018 26.8  22.0 - 29.0 mmol/L Final   • Calcium 12/27/2018 10.1  8.5 - 10.2 mg/dL Final   • Total Protein 12/27/2018 6.4  6.3 - 8.0 g/dL Final   • Albumin 12/27/2018 4.10  3.50 - 5.20 g/dL Final   • ALT (SGPT) 12/27/2018 17  0 - 33 U/L Final   • AST (SGOT) 12/27/2018 16  0 - 32 U/L Final   • Alkaline Phosphatase 12/27/2018 36* 38 - 116 U/L Final   • Total Bilirubin 12/27/2018 0.3  0.1 - 1.2 mg/dL Final   • eGFR Non African Amer 12/27/2018 54* >60 mL/min/1.73 Final   • Globulin 12/27/2018 2.3  1.8 - 3.5 gm/dL Final   • A/G Ratio 12/27/2018 1.8  1.1 - 2.4 g/dL Final   • BUN/Creatinine Ratio 12/27/2018 29.7  7.3 - 30.0 Final   • Anion Gap 12/27/2018 11.2  mmol/L Final   • Ferritin 12/27/2018 24.10  ng/mL Final   • Iron 12/27/2018 64  37 - 145 mcg/dL Final   • Iron Saturation 12/27/2018 14  14 - 48 % Final   • Transferrin 12/27/2018 332  200 - 360 mg/dL Final   • TIBC 12/27/2018 465  249 - 505 mcg/dL Final   • Immature Reticulocyte Fraction 12/27/2018 16.2* 3.0 -  15.8 % Final   • Reticulocyte % 12/27/2018 1.47  0.60 - 2.00 % Final   • Reticulocyte Hgb 12/27/2018 29.5* 29.8 - 36.1 pg Final   • Vitamin B-12 12/27/2018 498  211 - 946 pg/mL Final   • WBC 12/27/2018 6.34  4.00 - 10.00 10*3/mm3 Final   • RBC 12/27/2018 4.46  3.90 - 5.00 10*6/mm3 Final   • Hemoglobin 12/27/2018 12.1  11.5 - 14.9 g/dL Final   • Hematocrit 12/27/2018 37.6  34.0 - 45.0 % Final   • MCV 12/27/2018 84.3  83.0 - 97.0 fL Final   • MCH 12/27/2018 27.1  27.0 - 33.0 pg Final   • MCHC 12/27/2018 32.2  32.0 - 35.0 g/dL Final   • RDW 12/27/2018 13.8  11.7 - 14.5 % Final   • RDW-SD 12/27/2018 42.5  37.0 - 49.0 fl Final   • MPV 12/27/2018 11.8  8.9 - 12.1 fL Final   • Platelets 12/27/2018 203  150 - 375 10*3/mm3 Final   • Neutrophil % 12/27/2018 57.3  39.0 - 75.0 % Final   • Lymphocyte % 12/27/2018 22.9  20.0 - 49.0 % Final   • Monocyte % 12/27/2018 7.6  4.0 - 12.0 % Final   • Eosinophil % 12/27/2018 10.7* 1.0 - 5.0 % Final   • Basophil % 12/27/2018 0.9  0.0 - 1.1 % Final   • Immature Grans % 12/27/2018 0.6* 0.0 - 0.5 % Final   • Neutrophils, Absolute 12/27/2018 3.63  1.50 - 7.00 10*3/mm3 Final   • Lymphocytes, Absolute 12/27/2018 1.45  1.00 - 3.50 10*3/mm3 Final   • Monocytes, Absolute 12/27/2018 0.48  0.25 - 0.80 10*3/mm3 Final   • Eosinophils, Absolute 12/27/2018 0.68* 0.00 - 0.36 10*3/mm3 Final   • Basophils, Absolute 12/27/2018 0.06  0.00 - 0.10 10*3/mm3 Final   • Immature Grans, Absolute 12/27/2018 0.04* 0.00 - 0.03 10*3/mm3 Final   • nRBC 12/27/2018 0.0  0.0 - 0.0 /100 WBC Final

## 2019-04-01 NOTE — PROGRESS NOTES
Subsequent Medicare Wellness Visit   The ABC's of the Annual Wellness Visit    Chief Complaint   Patient presents with   • New Patient     former patient of Dr. Domingo   • Dizziness   • Hypertension   • Hyperlipidemia     Medicare Wellness - subsequent   • Insomnia     arthritis       HPI:  Shirin Washington, -1950, is a 68 y.o. female who presents for a Subsequent Medicare Wellness Visit.    Recent Hospitalizations:  No hospitalization(s) within the last year..    Current Medical Providers:  Patient Care Team:  Duncan Domingo MD as PCP - General (Internal Medicine)  Axel Lobato MD as PCP - Claims Attributed  Axel Lobato MD as Consulting Physician (Hematology and Oncology)  Chika Ospina MD as Referring Physician (General Surgery)    Health Habits and Functional and Cognitive Screening and Depression Screening:  Functional & Cognitive Status 2019   Do you have difficulty preparing food and eating? No   Do you have difficulty bathing yourself, getting dressed or grooming yourself? No   Do you have difficulty using the toilet? No   Do you have difficulty moving around from place to place? No   Do you have trouble with steps or getting out of a bed or a chair? No   In the past year have you fallen or experienced a near fall? No   Current Diet Well Balanced Diet   Dental Exam Up to date   Eye Exam Up to date   Exercise (times per week) 3 times per week   Current Exercise Activities Include Walking   Do you need help using the phone?  No   Are you deaf or do you have serious difficulty hearing?  No   Do you need help with transportation? No   Do you need help shopping? No   Do you need help preparing meals?  No   Do you need help with housework?  No   Do you need help with laundry? No   Do you need help taking your medications? No   Do you need help managing money? No   Do you ever drive or ride in a car without wearing a seat belt? No   Have you felt unusual stress, anger or loneliness in the last  month? No   Who do you live with? Spouse   If you need help, do you have trouble finding someone available to you? No   Have you been bothered in the last four weeks by sexual problems? No   Do you have difficulty concentrating, remembering or making decisions? No       Compared to one year ago, the patient feels her physical health is better and her mental health is the same.    Depression Screen:  PHQ-2/PHQ-9 Depression Screening 4/1/2019   Little interest or pleasure in doing things 0   Feeling down, depressed, or hopeless 0   Total Score 0         Past Medical/Family/Social History:  The following portions of the patient's history were reviewed and updated as appropriate: allergies, current medications, past family history, past medical history, past social history, past surgical history and problem list.    Allergies   Allergen Reactions   • Topamax [Topiramate]          Current Outpatient Medications:   •  ALPRAZolam (XANAX) 0.25 MG tablet, 0.25 mg 3 (Three) Times a Day As Needed., Disp: , Rfl:   •  amLODIPine (NORVASC) 2.5 MG tablet, Take 2.5 mg by mouth Daily., Disp: , Rfl:   •  atenolol (TENORMIN) 50 MG tablet, Take 1 tablet by mouth Daily., Disp: 30 tablet, Rfl: 6  •  denosumab (PROLIA) 60 MG/ML solution syringe, Inject 60 mg under the skin Every 6 (Six) Months., Disp: , Rfl:   •  fenofibrate (TRICOR) 145 MG tablet, Take 145 mg by mouth Daily., Disp: , Rfl:   •  Insulin Pen Needle (BD PEN NEEDLE SOCO U/F) 32G X 4 MM misc, 4 times daily, Disp: 200 each, Rfl: 3  •  meloxicam (MOBIC) 15 MG tablet, Take 15 mg by mouth daily., Disp: , Rfl:   •  nitroglycerin (NITROSTAT) 0.4 MG SL tablet, Place 0.4 mg under the tongue Every 5 (Five) Minutes As Needed., Disp: , Rfl:   •  NOVOLOG FLEXPEN 100 UNIT/ML solution pen-injector sc pen, INJECT 15 UNITS UNDER THE SKIN THREE TIMES A DAY WITH MEALS, Disp: 30 mL, Rfl: 1  •  ONETOUCH DELICA LANCETS 33G misc, , Disp: , Rfl:   •  ONETOUCH VERIO test strip, TO TEST BLOOD SUGARS 6  TIMES DAILY E11.8, Disp: 550 each, Rfl: 3  •  rosuvastatin (CRESTOR) 40 MG tablet, 1 tablet twice weekly, Disp: , Rfl:   •  SYNTHROID 50 MCG tablet, Take 1 tablet by mouth Daily., Disp: 30 tablet, Rfl: 6  •  TRESIBA FLEXTOUCH 200 UNIT/ML solution pen-injector, INJECT 30 UNITS UNDER THE SKIN DAILY, Disp: 9 mL, Rfl: 2  •  triamterene-hydrochlorothiazide (MAXZIDE-25) 37.5-25 MG per tablet, TAKES ONE HALF TABLET DAILY, Disp: , Rfl:   •  zolpidem (AMBIEN) 10 MG tablet, Take 10 mg by mouth Every Night., Disp: , Rfl:   •  aspirin 81 MG tablet, , Disp: , Rfl:     Aspirin use counseling: Start ASA 81 mg daily     Current medication list contains high risk medications. No harmful drug interactions have been identified.  Plan of action agree    Family History   Problem Relation Age of Onset   • Polycythemia Mother         Progressed to secondary myelofibrosis   • Cirrhosis Father    • Diabetes Other    • Hypertension Other    • Cancer Neg Hx        Social History     Tobacco Use   • Smoking status: Never Smoker   • Smokeless tobacco: Never Used   Substance Use Topics   • Alcohol use: Yes     Comment: OCCASIONAL       Past Surgical History:   Procedure Laterality Date   • BACK SURGERY  02/2015    L4-L5 laminectomy, L4-L5, L5-S1 fusion by Dr. Urbina   • CARDIAC CATHETERIZATION      ARTERIAL CATHETERIZATION   • CATARACT EXTRACTION     • CHOLECYSTECTOMY     • HYSTERECTOMY     • KNEE SURGERY Right 2009    Arthroscopy       Patient Active Problem List   Diagnosis   • Diabetes mellitus type 2, uncontrolled, with complications (CMS/HCC)   • Uncontrolled type 2 diabetes mellitus with background retinopathy (CMS/HCC)   • Encounter for long-term (current) use of insulin (CMS/HCC)   • Hyperinsulinism   • Essential hypertension   • HLD (hyperlipidemia)   • Iron deficiency anemia   • Coronary atherosclerosis   • Osteoporosis   • Adverse effect of iron   • B12 deficiency   • Postmenopausal osteoporosis   • Osteoporosis, post-menopausal   •  "Hypothyroidism   • Hyperlipidemia associated with type 2 diabetes mellitus (CMS/HCC)   • Dizziness   • Primary insomnia       Review of Systems    Objective     Vitals:    04/01/19 0912   BP: 136/74   BP Location: Left arm   Patient Position: Sitting   Cuff Size: Adult   Pulse: 69   Temp: 97.7 °F (36.5 °C)   TempSrc: Oral   SpO2: 99%   Weight: 59.5 kg (131 lb 3.2 oz)   Height: 151.1 cm (59.5\")  Comment: new measurement   PainSc: 0-No pain       Patient's Body mass index is 26.06 kg/m². BMI is within normal parameters. No follow-up required..      No exam data present    The patient has no evidence of cognitve impairment.     Physical Exam    Recent Lab Results:  Lab Results   Component Value Date     (H) 04/04/2019     Lab Results   Component Value Date    TRIG 100 07/04/2014    HDL 47 07/04/2014       Assessment/Plan   Age-appropriate Screening Schedule:  Refer to the list below for future screening recommendations based on patient's age, sex and/or medical conditions.      Health Maintenance   Topic Date Due   • TDAP/TD VACCINES (1 - Tdap) 05/28/1967   • ZOSTER VACCINE (1 of 2) 05/28/1998   • DIABETIC FOOT EXAM  03/30/2017   • LIPID PANEL  03/30/2017   • DXA SCAN  04/18/2017   • DIABETIC EYE EXAM  05/29/2019   • INFLUENZA VACCINE  08/01/2019   • HEMOGLOBIN A1C  10/04/2019   • MAMMOGRAM  11/27/2019   • PNEUMOCOCCAL VACCINES (65+ LOW/MEDIUM RISK) (2 of 2 - PPSV23) 04/01/2020   • URINE MICROALBUMIN  04/04/2020   • COLONOSCOPY  01/01/2024       Medicare Risks and Personalized Health Plan:        CMS-Preventive Services Quick Reference  Medicare Preventive Services Addressed:  Annual Wellness Visit (AWV)  Bone Density Measurements  Cardiovascular Disease Screening Tests (may do this order every 5 years in beneficiaries without signs or symptoms of cardiovascular disease)    Advance Care Planning:  Patient does not have an advance directive - information provided to the patient today  Yehuda Washington  Diagnoses " and all orders for this visit:    1. Essential hypertension (Primary)    2. Atherosclerosis of native coronary artery of native heart without angina pectoris    3. Hyperlipidemia, unspecified hyperlipidemia type  -     Lipid Panel    4. Primary insomnia    5. Need for vaccination for pneumococcus    Other orders  -     Pneumococcal Conjugate Vaccine 13-[facility] All  -     Pneumococcal Polysaccharide Vaccine 23-[facility] Greater Than or Equal To 3yo Subcutaneous / IM; Future        An After Visit Summary and PPPS with all of these plans were given to the patient.      Follow Up:  Return in about 6 months (around 10/1/2019), or if symptoms worsen or fail to improve, for Recheck, Next scheduled follow up.

## 2019-04-02 ENCOUNTER — TELEPHONE (OUTPATIENT)
Dept: INTERNAL MEDICINE | Facility: CLINIC | Age: 69
End: 2019-04-02

## 2019-04-02 NOTE — TELEPHONE ENCOUNTER
Patient called stating that she would like for Dr. Salinas to know that she really does like him and enjoyed her visit with him yesterday.  She spoke to her  last night about all of her issues and she has decided to continue seeing Dr. Domingo.  She appreciated Dr. Salinas's directness and honesty and wanted to let him know that it has nothing to do with him at all but that her decision is based on all of her issues.

## 2019-04-04 ENCOUNTER — LAB (OUTPATIENT)
Dept: ENDOCRINOLOGY | Age: 69
End: 2019-04-04

## 2019-04-04 DIAGNOSIS — IMO0002 DIABETES MELLITUS TYPE 2, UNCONTROLLED, WITH COMPLICATIONS: ICD-10-CM

## 2019-04-04 DIAGNOSIS — M81.0 OSTEOPOROSIS, POST-MENOPAUSAL: ICD-10-CM

## 2019-04-04 DIAGNOSIS — IMO0002 UNCONTROLLED TYPE 2 DIABETES MELLITUS WITH COMPLICATION, WITH LONG-TERM CURRENT USE OF INSULIN: Primary | ICD-10-CM

## 2019-04-04 DIAGNOSIS — IMO0002 UNCONTROLLED TYPE 2 DIABETES MELLITUS WITH COMPLICATION, WITH LONG-TERM CURRENT USE OF INSULIN: ICD-10-CM

## 2019-04-05 LAB
ALBUMIN SERPL-MCNC: 4.3 G/DL (ref 3.5–5.2)
ALBUMIN/CREAT UR: <7.3 MG/G CREAT (ref 0–30)
ALBUMIN/GLOB SERPL: 1.7 G/DL
ALP SERPL-CCNC: 27 U/L (ref 39–117)
ALT SERPL-CCNC: 17 U/L (ref 1–33)
AST SERPL-CCNC: 15 U/L (ref 1–32)
BILIRUB SERPL-MCNC: 0.6 MG/DL (ref 0.2–1.2)
BUN SERPL-MCNC: 35 MG/DL (ref 8–23)
BUN/CREAT SERPL: 36.5 (ref 7–25)
CALCIUM SERPL-MCNC: 10.2 MG/DL (ref 8.6–10.5)
CHLORIDE SERPL-SCNC: 103 MMOL/L (ref 98–107)
CO2 SERPL-SCNC: 24.4 MMOL/L (ref 22–29)
CREAT SERPL-MCNC: 0.96 MG/DL (ref 0.57–1)
CREAT UR-MCNC: 40.9 MG/DL
FRUCTOSAMINE SERPL-SCNC: 234 UMOL/L (ref 0–285)
GLOBULIN SER CALC-MCNC: 2.5 GM/DL
GLUCOSE SERPL-MCNC: 103 MG/DL (ref 65–99)
HBA1C MFR BLD: 5.7 % (ref 4.8–5.6)
MICROALBUMIN UR-MCNC: <3 UG/ML
POTASSIUM SERPL-SCNC: 4.2 MMOL/L (ref 3.5–5.2)
PROT SERPL-MCNC: 6.8 G/DL (ref 6–8.5)
SODIUM SERPL-SCNC: 140 MMOL/L (ref 136–145)
T4 FREE SERPL-MCNC: 1.54 NG/DL (ref 0.93–1.7)
TSH SERPL DL<=0.005 MIU/L-ACNC: 1.85 MIU/ML (ref 0.27–4.2)

## 2019-04-11 ENCOUNTER — OFFICE VISIT (OUTPATIENT)
Dept: ENDOCRINOLOGY | Age: 69
End: 2019-04-11

## 2019-04-11 VITALS
HEART RATE: 81 BPM | SYSTOLIC BLOOD PRESSURE: 120 MMHG | WEIGHT: 130.4 LBS | DIASTOLIC BLOOD PRESSURE: 70 MMHG | BODY MASS INDEX: 25.6 KG/M2 | HEIGHT: 60 IN

## 2019-04-11 DIAGNOSIS — M81.0 POSTMENOPAUSAL OSTEOPOROSIS: ICD-10-CM

## 2019-04-11 DIAGNOSIS — E03.9 HYPOTHYROIDISM, UNSPECIFIED TYPE: ICD-10-CM

## 2019-04-11 DIAGNOSIS — E78.5 HYPERLIPIDEMIA ASSOCIATED WITH TYPE 2 DIABETES MELLITUS (HCC): ICD-10-CM

## 2019-04-11 DIAGNOSIS — IMO0002 DIABETES MELLITUS TYPE 2, UNCONTROLLED, WITH COMPLICATIONS: Primary | ICD-10-CM

## 2019-04-11 DIAGNOSIS — IMO0002 UNCONTROLLED TYPE 2 DIABETES MELLITUS WITH BACKGROUND RETINOPATHY: ICD-10-CM

## 2019-04-11 DIAGNOSIS — E16.1 HYPERINSULINISM: ICD-10-CM

## 2019-04-11 DIAGNOSIS — E11.69 HYPERLIPIDEMIA ASSOCIATED WITH TYPE 2 DIABETES MELLITUS (HCC): ICD-10-CM

## 2019-04-11 DIAGNOSIS — Z79.4 ENCOUNTER FOR LONG-TERM (CURRENT) USE OF INSULIN (HCC): ICD-10-CM

## 2019-04-11 PROCEDURE — 95251 CONT GLUC MNTR ANALYSIS I&R: CPT | Performed by: INTERNAL MEDICINE

## 2019-04-11 PROCEDURE — 99214 OFFICE O/P EST MOD 30 MIN: CPT | Performed by: INTERNAL MEDICINE

## 2019-04-11 NOTE — PROGRESS NOTES
68 y.o.    Patient Care Team:  Madi Salinas MD as PCP - General (Family Medicine)  Axel Lobato MD as PCP - Claims Attributed  Axel Lobato MD as Consulting Physician (Hematology and Oncology)  Chika Ospina MD as Referring Physician (General Surgery)    Chief Complaint:      F/U TYPE 2 DIABETES, UNCONTROLLED.  HYPOTHYROIDISM.  HERE TO DISCUSS LAB RESULTS.     Subjective     HPI   Patient is a 68-year-old white female with a past history of uncontrolled type 2 diabetes mellitus came for follow-up  She is a former patient of Dr. Tam    Uncontrolled type 2 diabetes mellitus  Patient is currently taking tresiba 16 units at night for the past 1 week  She used to be on 20 units and started experiencing hypoglycemia  Patient takes NovoLog 1 unit for every 10 carbs  Her correction scale appears to be 1 unit for every 25/100  Patient has noticed hypoglycemia even after breakfast and lunch at times  Hypoglycemia  As above  Uncontrolled type 2 diabetes mellitus neuropathy  Patient is diagnosed with diabetic gastroparesis  Uncontrolled type 2 diabetes mellitus with background diabetic retinopathy  Patient has regular follow-up with ophthalmologist  Patient denies any knowledge of diabetic nephropathy  Postmenopausal osteoporosis  Patient is currently taking Prolia since May 2017  She denies any side effects of medication  Patient's hemoglobin A1c recently was 5.7%  Patient is using dexcom for continuous glucometer readings      Interval History    Summary  Uncontrolled type 2 diabetes mellitus  Patient was diagnosed approximately 18 years ago. She reported that she's taking Levemir 15 units at night and takes NovoLog one unit for every 10 carbohydrates and a correction scale of one unit for every 25 mg over 125.  Patient reported that most of the blood sugars are in the 100-200 range. Occasionally she has a blood sugar 75-90. She keeps adjusting her insulin based on the blood sugars      Uncontrolled type 2  diabetes mellitus with retinopathy.  Patient reports to have had background diabetic retinopathy in the right eye which apparently stable for the past several years.  Patient denied any knowledge of symptoms of diabetic neuropathy or nephropathy.  Patient also denied any coronary artery disease or TIA or stroke.  Patient is physically very active, she plays golf almost daily.      Hypoglycemia  Patient periodically gets iron infusions for anemia and has had episodes of hypoglycemia.  Hyperlipidemia  Patient is currently on Lipitor 80 mg as well as TriCor 145 mg daily. She denies any side effects on the medication.  Hypertension  Patient is currently on Norvasc 2.5 mg daily as well as Tenormin 25 mg daily, Maxzide one tablet daily. Her blood pressure is stable currently.  Patient denied any side effects on the medication        The following portions of the patient's history were reviewed and updated as appropriate: allergies, current medications, past family history, past medical history, past social history, past surgical history and problem list.    Past Medical History:   Diagnosis Date   • Anemia    • Arthritis    • Back pain    • Background diabetic retinopathy of right eye determined by examination (CMS/[facility])    • Coronary atherosclerosis     cath 1/2014 with 50% mid LAD, otherwise unremarkable   • Diabetes mellitus type 2, insulin dependent (CMS/[facility])    • Gastroparesis due to secondary diabetes (CMS/[facility])    • GERD (gastroesophageal reflux disease)    • History of spinal stenosis    • History of spondylolisthesis    • History of URI (upper respiratory infection)    • Hypercholesteremia    • Hyperlipidemia    • Hypertension    • Iron deficiency anemia    • Left-sided weakness     ARM AND LEG   • Leukocytosis     MILD   • Osteopenia    • Osteoporosis    • Peripheral neuropathy    • Retinopathy    • Vertigo      Family History   Problem Relation Age of Onset   • Polycythemia Mother         Progressed to secondary  myelofibrosis   • Cirrhosis Father    • Diabetes Other    • Hypertension Other    • Cancer Neg Hx      Social History     Socioeconomic History   • Marital status:      Spouse name: Yehuda   • Number of children: Not on file   • Years of education: Not on file   • Highest education level: Not on file   Occupational History   • Occupation: 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Employer: [employer] [place]     Employer: RETIRED   Tobacco Use   • Smoking status: Never Smoker   • Smokeless tobacco: Never Used   Substance and Sexual Activity   • Alcohol use: Yes     Comment: OCCASIONAL   • Drug use: No   • Sexual activity: Defer     Allergies   Allergen Reactions   • Topamax [Topiramate]        Current Outpatient Medications:   •  ALPRAZolam (XANAX) 0.25 MG tablet, 0.25 mg 3 (Three) Times a Day As Needed., Disp: , Rfl:   •  amLODIPine (NORVASC) 2.5 MG tablet, Take 2.5 mg by mouth Daily., Disp: , Rfl:   •  atenolol (TENORMIN) 50 MG tablet, Take 1 tablet by mouth Daily., Disp: 30 tablet, Rfl: 6  •  denosumab (PROLIA) 60 MG/ML solution syringe, Inject 60 mg under the skin Every 6 (Six) Months., Disp: , Rfl:   •  fenofibrate (TRICOR) 145 MG tablet, Take 145 mg by mouth Daily., Disp: , Rfl:   •  Insulin Pen Needle (BD PEN NEEDLE SOCO U/F) 32G X 4 MM misc, 4 times daily, Disp: 200 each, Rfl: 3  •  meloxicam (MOBIC) 15 MG tablet, Take 15 mg by mouth daily., Disp: , Rfl:   •  nitroglycerin (NITROSTAT) 0.4 MG SL tablet, Place 0.4 mg under the tongue Every 5 (Five) Minutes As Needed., Disp: , Rfl:   •  NOVOLOG FLEXPEN 100 UNIT/ML solution pen-injector sc pen, INJECT 15 UNITS UNDER THE SKIN THREE TIMES A DAY WITH MEALS, Disp: 30 mL, Rfl: 1  •  ONETOUCH DELICA LANCETS 33G misc, , Disp: , Rfl:   •  ONETOUCH VERIO test strip, TO TEST BLOOD SUGARS 6 TIMES DAILY E11.8, Disp: 550 each, Rfl: 3  •  rosuvastatin (CRESTOR) 40 MG tablet, 1 tablet twice weekly, Disp: , Rfl:   •  SYNTHROID 50 MCG tablet, Take 1 tablet by mouth Daily.,  "Disp: 30 tablet, Rfl: 6  •  TRESIBA FLEXTOUCH 200 UNIT/ML solution pen-injector, INJECT 30 UNITS UNDER THE SKIN DAILY, Disp: 9 mL, Rfl: 2  •  triamterene-hydrochlorothiazide (MAXZIDE-25) 37.5-25 MG per tablet, TAKES ONE HALF TABLET DAILY, Disp: , Rfl:   •  zolpidem (AMBIEN) 10 MG tablet, Take 10 mg by mouth Every Night., Disp: , Rfl:         Review of Systems   Constitutional: Negative for chills, fatigue and fever.   Cardiovascular: Negative for chest pain and palpitations.   Gastrointestinal: Positive for abdominal pain and nausea. Negative for constipation, diarrhea and vomiting.   Endocrine: Positive for cold intolerance. Negative for heat intolerance.   All other systems reviewed and are negative.      Objective       Vitals:    04/11/19 0857   BP: 120/70   Pulse: 81   Weight: 59.1 kg (130 lb 6.4 oz)   Height: 152.4 cm (60\")     Body mass index is 25.47 kg/m².      Physical Exam   Constitutional: She is oriented to person, place, and time.   Eyes: EOM are normal. Pupils are equal, round, and reactive to light.   Neck: Normal range of motion. Neck supple.   Cardiovascular: Normal rate, regular rhythm, normal heart sounds and intact distal pulses.   Pulmonary/Chest: Effort normal and breath sounds normal.   Abdominal: Soft. Bowel sounds are normal.   Musculoskeletal: Normal range of motion. She exhibits no edema.   Neurological: She is alert and oriented to person, place, and time.   Skin: Skin is warm and dry.   Psychiatric: She has a normal mood and affect. Her behavior is normal.   Nursing note and vitals reviewed.    Results Review:     I reviewed the patient's new clinical results.    Medical records reviewed  Summary:      Lab on 04/04/2019   Component Date Value Ref Range Status   • Fructosamine 04/04/2019 234  0 - 285 umol/L Final    Comment: Published reference interval for apparently healthy subjects  between age 20 and 60 is 205 - 285 umol/L and in a poorly  controlled diabetic population is 228 - 563 " umol/L with a  mean of 396 umol/L.     • Creatinine, Urine 04/04/2019 40.9  Not Estab. mg/dL Final   • Microalbumin, Urine 04/04/2019 <3.0  Not Estab. ug/mL Final   • Microalbumin/Creatinine Ratio 04/04/2019 <7.3  0.0 - 30.0 mg/g creat Final    Comment:                      Normal:                0.0 -  30.0                       Albuminuria:          31.0 - 300.0                       Clinical albuminuria:       >300.0     • TSH 04/04/2019 1.850  0.270 - 4.200 mIU/mL Final   • Free T4 04/04/2019 1.54  0.93 - 1.70 ng/dL Final   • Hemoglobin A1C 04/04/2019 5.70* 4.80 - 5.60 % Final    Comment: Hemoglobin A1C Ranges:  Increased Risk for Diabetes  5.7% to 6.4%  Diabetes                     >= 6.5%  Diabetic Goal                < 7.0%     • Glucose 04/04/2019 103* 65 - 99 mg/dL Final   • BUN 04/04/2019 35* 8 - 23 mg/dL Final   • Creatinine 04/04/2019 0.96  0.57 - 1.00 mg/dL Final   • eGFR Non African Am 04/04/2019 57* >60 mL/min/1.73 Final   • eGFR African Am 04/04/2019 70  >60 mL/min/1.73 Final   • BUN/Creatinine Ratio 04/04/2019 36.5* 7.0 - 25.0 Final   • Sodium 04/04/2019 140  136 - 145 mmol/L Final   • Potassium 04/04/2019 4.2  3.5 - 5.2 mmol/L Final   • Chloride 04/04/2019 103  98 - 107 mmol/L Final   • Total CO2 04/04/2019 24.4  22.0 - 29.0 mmol/L Final   • Calcium 04/04/2019 10.2  8.6 - 10.5 mg/dL Final   • Total Protein 04/04/2019 6.8  6.0 - 8.5 g/dL Final   • Albumin 04/04/2019 4.30  3.50 - 5.20 g/dL Final   • Globulin 04/04/2019 2.5  gm/dL Final   • A/G Ratio 04/04/2019 1.7  g/dL Final   • Total Bilirubin 04/04/2019 0.6  0.2 - 1.2 mg/dL Final   • Alkaline Phosphatase 04/04/2019 27* 39 - 117 U/L Final   • AST (SGOT) 04/04/2019 15  1 - 32 U/L Final   • ALT (SGPT) 04/04/2019 17  1 - 33 U/L Final     Lab Results   Component Value Date    HGBA1C 5.70 (H) 04/04/2019    HGBA1C 5.97 (H) 12/14/2018    HGBA1C 6.15 (H) 09/12/2018     Lab Results   Component Value Date    MICROALBUR <3.0 04/04/2019    CREATININE 0.96  "04/04/2019     Imaging Results (most recent)     None                                Assessment and Plan:    Shirin was seen today for diabetes.    Diagnoses and all orders for this visit:    Diabetes mellitus type 2, uncontrolled, with complications (CMS/[facility])    Uncontrolled type 2 diabetes mellitus with background retinopathy (CMS/[facility])    Hypothyroidism, unspecified type    Hyperinsulinism    Hyperlipidemia associated with type 2 diabetes mellitus (CMS/[facility])    Postmenopausal osteoporosis    Encounter for long-term (current) use of insulin (CMS/[facility])        Dexcom CGM reviewed  Blood sugars look much better  Time spent in target ranges nearly 75%  Patient has very few episodes of hypoglycemia    Lab reports reviewed  Hemoglobin A1c is 5.7%  Patient is actually experiencing hypoglycemia during the day and sometimes at night  She decrease the tresiba 16 units at night  She is keeping the NovoLog 1 unit for every 10 carbs and correction 1 unit for every 25/100    Patient is also reporting hypoglycemia unawareness that started within the past few weeks  I advised the patient to continue tresiba 16 at night  Advised her to change NovoLog ratios as follows  1 unit for every 12 carbs  1 unit for every 25/125 for correction  Patient verbalized understanding    Patient return to follow-up in 3 months.    The total time spent  was more than 25 min of which greater than 15 min of time (greater than 50% of the total time)  was spent face to face with the patient counseling and coordination of care on recommended evaluation and treatment options, instructions for management/treatment and /or follow up and importance of compliance with chosen management or treatment options  Frank Allen MD. FACE    04/11/19      EMR Dragon / transcription disclaimer:     \"Dictated utilizing Dragon dictation\".         "

## 2019-04-22 ENCOUNTER — LAB (OUTPATIENT)
Dept: LAB | Facility: HOSPITAL | Age: 69
End: 2019-04-22

## 2019-04-22 DIAGNOSIS — D50.9 IRON DEFICIENCY ANEMIA, UNSPECIFIED IRON DEFICIENCY ANEMIA TYPE: ICD-10-CM

## 2019-04-22 LAB
BASOPHILS # BLD AUTO: 0.05 10*3/MM3 (ref 0–0.2)
BASOPHILS NFR BLD AUTO: 0.7 % (ref 0–1.5)
DEPRECATED RDW RBC AUTO: 41.7 FL (ref 37–54)
EOSINOPHIL # BLD AUTO: 0.49 10*3/MM3 (ref 0–0.4)
EOSINOPHIL NFR BLD AUTO: 6.9 % (ref 0.3–6.2)
ERYTHROCYTE [DISTWIDTH] IN BLOOD BY AUTOMATED COUNT: 12.8 % (ref 12.3–15.4)
FERRITIN SERPL-MCNC: 356.5 NG/ML (ref 13–150)
HCT VFR BLD AUTO: 40.2 % (ref 34–46.6)
HGB BLD-MCNC: 13.5 G/DL (ref 12–15.9)
HGB RETIC QN AUTO: 34 PG (ref 29.8–36.1)
IMM GRANULOCYTES # BLD AUTO: 0.06 10*3/MM3 (ref 0–0.05)
IMM GRANULOCYTES NFR BLD AUTO: 0.8 % (ref 0–0.5)
IMM RETICS NFR: 10.6 % (ref 3–15.8)
IRON 24H UR-MRATE: 82 MCG/DL (ref 37–145)
IRON SATN MFR SERPL: 21 % (ref 14–48)
LYMPHOCYTES # BLD AUTO: 1.27 10*3/MM3 (ref 0.7–3.1)
LYMPHOCYTES NFR BLD AUTO: 18 % (ref 19.6–45.3)
MCH RBC QN AUTO: 29.8 PG (ref 26.6–33)
MCHC RBC AUTO-ENTMCNC: 33.6 G/DL (ref 31.5–35.7)
MCV RBC AUTO: 88.7 FL (ref 79–97)
MONOCYTES # BLD AUTO: 0.51 10*3/MM3 (ref 0.1–0.9)
MONOCYTES NFR BLD AUTO: 7.2 % (ref 5–12)
NEUTROPHILS # BLD AUTO: 4.69 10*3/MM3 (ref 1.7–7)
NEUTROPHILS NFR BLD AUTO: 66.4 % (ref 42.7–76)
NRBC BLD AUTO-RTO: 0 /100 WBC (ref 0–0.2)
PLATELET # BLD AUTO: 235 10*3/MM3 (ref 140–450)
PMV BLD AUTO: 11.4 FL (ref 6–12)
RBC # BLD AUTO: 4.53 10*6/MM3 (ref 3.77–5.28)
RETICS/RBC NFR AUTO: 1.96 % (ref 0.7–1.9)
TIBC SERPL-MCNC: 396 MCG/DL (ref 249–505)
TRANSFERRIN SERPL-MCNC: 283 MG/DL (ref 200–360)
VIT B12 BLD-MCNC: 305 PG/ML (ref 211–946)
WBC NRBC COR # BLD: 7.07 10*3/MM3 (ref 3.4–10.8)

## 2019-04-22 PROCEDURE — 83540 ASSAY OF IRON: CPT | Performed by: INTERNAL MEDICINE

## 2019-04-22 PROCEDURE — 82728 ASSAY OF FERRITIN: CPT | Performed by: INTERNAL MEDICINE

## 2019-04-22 PROCEDURE — 85046 RETICYTE/HGB CONCENTRATE: CPT | Performed by: INTERNAL MEDICINE

## 2019-04-22 PROCEDURE — 36415 COLL VENOUS BLD VENIPUNCTURE: CPT | Performed by: INTERNAL MEDICINE

## 2019-04-22 PROCEDURE — 84466 ASSAY OF TRANSFERRIN: CPT | Performed by: INTERNAL MEDICINE

## 2019-04-22 PROCEDURE — 82607 VITAMIN B-12: CPT | Performed by: INTERNAL MEDICINE

## 2019-04-22 PROCEDURE — 85025 COMPLETE CBC W/AUTO DIFF WBC: CPT | Performed by: INTERNAL MEDICINE

## 2019-04-29 ENCOUNTER — APPOINTMENT (OUTPATIENT)
Dept: LAB | Facility: HOSPITAL | Age: 69
End: 2019-04-29

## 2019-04-29 ENCOUNTER — OFFICE VISIT (OUTPATIENT)
Dept: ONCOLOGY | Facility: CLINIC | Age: 69
End: 2019-04-29

## 2019-04-29 VITALS
HEART RATE: 68 BPM | WEIGHT: 128.9 LBS | OXYGEN SATURATION: 100 % | RESPIRATION RATE: 16 BRPM | DIASTOLIC BLOOD PRESSURE: 67 MMHG | SYSTOLIC BLOOD PRESSURE: 141 MMHG | HEIGHT: 60 IN | BODY MASS INDEX: 25.31 KG/M2 | TEMPERATURE: 98.5 F

## 2019-04-29 DIAGNOSIS — E53.8 B12 DEFICIENCY: ICD-10-CM

## 2019-04-29 DIAGNOSIS — D50.9 IRON DEFICIENCY ANEMIA, UNSPECIFIED IRON DEFICIENCY ANEMIA TYPE: Primary | ICD-10-CM

## 2019-04-29 PROCEDURE — 99214 OFFICE O/P EST MOD 30 MIN: CPT | Performed by: INTERNAL MEDICINE

## 2019-04-29 NOTE — PROGRESS NOTES
[facility] GROUP OUTPATIENT FOLLOW UP CLINIC VISIT    REASON FOR FOLLOW-UP:    1.  Iron deficiency anemia.  Her last intravenous Feraheme infusions or in October 2015.    HISTORY OF PRESENT ILLNESS:  Shirin Washington is a 68 y.o. female who returns today for follow up of the above issue.    She received 1 dose of Injectafer on 1/4/2019.  She tolerated this well.  She continues to have some back pain.  She has some left lower quadrant abdominal pain which seems to be associated with her back pain as well.  Mild fatigue persists.  Overall, she does feel better after the Injectafer.      PAST MEDICAL, SURGICAL, FAMILY, AND SOCIAL HISTORIES were reviewed with the patient and in the electronic medical record, and were updated if indicated.    ALLERGIES:  Allergies   Allergen Reactions   • Topamax [Topiramate]        MEDICATIONS:  The medication list has been reviewed with the patient by the medical assistant, and the list has been updated in the electronic medical record, which I reviewed.  Medication dosages and frequencies were confirmed to be accurate.    REVIEW OF SYSTEMS:  PAIN:  See Vital Signs below.  GENERAL:  No fevers, chills, night sweats, or unintended weight loss.  Fatigue.    SKIN:  No rashes or non-healing lesions.  HEME/LYMPH:  No abnormal bleeding.  No palpable lymphadenopathy.  EYES:  No vision changes or diplopia.  ENT:  Vertigo which has improved  RESPIRATORY:  No cough, shortness of breath, hemoptysis, or wheezing.  CARDIOVASCULAR:  No chest pain or shortness of breath  GASTROINTESTINAL:  Abdominal bloating .  Some left lower quadrant discomfort  GENITOURINARY:  No dysuria or hematuria.  MUSCULOSKELETAL: Chronic back pain which has improved  NEUROLOGIC:  No dizziness, loss of consciousness, or seizures.  PSYCHIATRIC:  No depression, anxiety, or mood changes.    Vitals:    04/29/19 0858   BP: 141/67   Pulse: 68   Resp: 16   Temp: 98.5 °F (36.9 °C)   TempSrc: Oral   SpO2: 100%   Weight: 58.5 kg  "(128 lb 14.4 oz)   Height: 152.4 cm (60\")   PainSc: 2  Comment: Left side       PHYSICAL EXAMINATION:  GENERAL:  Well-developed well-nourished female; awake, alert and oriented, in no acute distress.  SKIN:  Warm and dry, without rashes, purpura, or petechiae.  HEAD:  Normocephalic, atraumatic.  EYES:  Pupils equal, round and reactive to light.  Extraocular movements intact.  Conjunctivae normal.  EARS:  Hearing intact.  NOSE:  Septum midline.  No excoriations or nasal discharge.  MOUTH:  No stomatitis or ulcers.  Lips are normal.  THROAT:  Oropharynx without lesions or exudates.  NECK:  Supple with good range of motion; no thyromegaly or masses; no JVD or bruits.  LYMPHATICS:  No cervical, supraclavicular, axillary, or inguinal lymphadenopathy.  CHEST:  Lungs are clear to auscultation bilaterally.  No wheezes, rales, or rhonchi.  HEART:  Regular rate; normal rhythm.  No murmurs, gallops or rubs.  ABDOMEN:  Soft, nontender, normal active bowel sounds, nondistended  EXTREMITIES:  No clubbing, cyanosis, or edema.  NEUROLOGICAL:  No focal neurologic deficits.    DIAGNOSTIC DATA:  Results for orders placed or performed in visit on 04/22/19   Ferritin   Result Value Ref Range    Ferritin 356.50 (H) 13.00 - 150.00 ng/mL   Iron Profile   Result Value Ref Range    Iron 82 37 - 145 mcg/dL    Iron Saturation 21 14 - 48 %    Transferrin 283 200 - 360 mg/dL    TIBC 396 249 - 505 mcg/dL   Retic With IRF & RET-He   Result Value Ref Range    Immature Reticulocyte Fraction 10.6 3.0 - 15.8 %    Reticulocyte % 1.96 (H) 0.70 - 1.90 %    Reticulocyte Hgb 34.0 29.8 - 36.1 pg   Vitamin B12   Result Value Ref Range    Vitamin B-12 305 211 - 946 pg/mL   CBC Auto Differential   Result Value Ref Range    WBC 7.07 3.40 - 10.80 10*3/mm3    RBC 4.53 3.77 - 5.28 10*6/mm3    Hemoglobin 13.5 12.0 - 15.9 g/dL    Hematocrit 40.2 34.0 - 46.6 %    MCV 88.7 79.0 - 97.0 fL    MCH 29.8 26.6 - 33.0 pg    MCHC 33.6 31.5 - 35.7 g/dL    RDW 12.8 12.3 - 15.4 " %    RDW-SD 41.7 37.0 - 54.0 fl    MPV 11.4 6.0 - 12.0 fL    Platelets 235 140 - 450 10*3/mm3    Neutrophil % 66.4 42.7 - 76.0 %    Lymphocyte % 18.0 (L) 19.6 - 45.3 %    Monocyte % 7.2 5.0 - 12.0 %    Eosinophil % 6.9 (H) 0.3 - 6.2 %    Basophil % 0.7 0.0 - 1.5 %    Immature Grans % 0.8 (H) 0.0 - 0.5 %    Neutrophils, Absolute 4.69 1.70 - 7.00 10*3/mm3    Lymphocytes, Absolute 1.27 0.70 - 3.10 10*3/mm3    Monocytes, Absolute 0.51 0.10 - 0.90 10*3/mm3    Eosinophils, Absolute 0.49 (H) 0.00 - 0.40 10*3/mm3    Basophils, Absolute 0.05 0.00 - 0.20 10*3/mm3    Immature Grans, Absolute 0.06 (H) 0.00 - 0.05 10*3/mm3    nRBC 0.0 0.0 - 0.2 /100 WBC         ASSESSMENT:  This is a 68 y.o. female with:  1.  History of iron deficiency anemia: She required intravenous Feraheme last in October 2015 and we gave 2 more doses in September and October 2017.  No obvious GI blood loss.  I suspect she has some malabsorption.  She has symptoms consistent with gastroparesis.  She also received vitamin B12 injections in the past and she received 2 injections with her Feraheme infusions.  At the end of 2018 her ferritin had dropped considerably.  Therefore, we proceeded with one dose of Injectafer.  She is no longer on her vitamin B12 supplement.    2.  Fatigue: Improved on a thyroid supplement    3.  Gastroparesis    4.  B12 deficiency: Her B12 level has dropped some although it remains normal.  I advised her to resume her vitamin B12 supplement several days per week.    5.  Chronic back pain with some left lower quadrant abdominal pain that may be associated with this as well.    PLAN:  1.  She will resume her vitamin B12 supplement several days per week and I will see her back in 4 months for follow-up with labs including a CBC, reticulocyte count, ferritin, iron profile, and vitamin B12 level done prior to that.

## 2019-05-28 ENCOUNTER — INFUSION (OUTPATIENT)
Dept: ONCOLOGY | Facility: HOSPITAL | Age: 69
End: 2019-05-28

## 2019-05-28 VITALS
TEMPERATURE: 97.5 F | OXYGEN SATURATION: 98 % | HEART RATE: 65 BPM | BODY MASS INDEX: 25.04 KG/M2 | WEIGHT: 128.2 LBS | SYSTOLIC BLOOD PRESSURE: 145 MMHG | DIASTOLIC BLOOD PRESSURE: 75 MMHG

## 2019-05-28 DIAGNOSIS — M81.0 OSTEOPOROSIS, UNSPECIFIED OSTEOPOROSIS TYPE, UNSPECIFIED PATHOLOGICAL FRACTURE PRESENCE: ICD-10-CM

## 2019-05-28 DIAGNOSIS — M81.0 OSTEOPOROSIS, POST-MENOPAUSAL: Primary | ICD-10-CM

## 2019-05-28 PROCEDURE — 25010000002 DENOSUMAB 60 MG/ML SOLUTION PREFILLED SYRINGE: Performed by: OBSTETRICS & GYNECOLOGY

## 2019-05-28 PROCEDURE — 96372 THER/PROPH/DIAG INJ SC/IM: CPT | Performed by: NURSE PRACTITIONER

## 2019-05-28 RX ADMIN — DENOSUMAB 60 MG: 60 INJECTION SUBCUTANEOUS at 14:31

## 2019-05-28 NOTE — PROGRESS NOTES
Arrived ambulatory  for prolia injection. Indication and side effects reviewed. Denies recent dental work. Labs and medications verified. Prolia administered in left  arm without incidence. Instructed to call prescribing MD for any concerns or questions and instructed on how to schedule future appts.  Pt vu and discharged ambulatory.

## 2019-07-11 ENCOUNTER — LAB (OUTPATIENT)
Dept: ENDOCRINOLOGY | Age: 69
End: 2019-07-11

## 2019-07-11 DIAGNOSIS — IMO0002 DIABETES MELLITUS TYPE 2, UNCONTROLLED, WITH COMPLICATIONS: Primary | ICD-10-CM

## 2019-07-11 DIAGNOSIS — E03.9 HYPOTHYROIDISM, UNSPECIFIED TYPE: ICD-10-CM

## 2019-07-11 DIAGNOSIS — IMO0002 DIABETES MELLITUS TYPE 2, UNCONTROLLED, WITH COMPLICATIONS: ICD-10-CM

## 2019-07-12 LAB
ALBUMIN SERPL-MCNC: 4.3 G/DL (ref 3.5–5.2)
ALBUMIN/CREAT UR: <9.6 MG/G CREAT (ref 0–30)
ALBUMIN/GLOB SERPL: 2.2 G/DL
ALP SERPL-CCNC: 35 U/L (ref 39–117)
ALT SERPL-CCNC: 17 U/L (ref 1–33)
AST SERPL-CCNC: 14 U/L (ref 1–32)
BILIRUB SERPL-MCNC: 0.5 MG/DL (ref 0.2–1.2)
BUN SERPL-MCNC: 28 MG/DL (ref 8–23)
BUN/CREAT SERPL: 28.3 (ref 7–25)
CALCIUM SERPL-MCNC: 9.3 MG/DL (ref 8.6–10.5)
CHLORIDE SERPL-SCNC: 104 MMOL/L (ref 98–107)
CO2 SERPL-SCNC: 26.5 MMOL/L (ref 22–29)
CREAT SERPL-MCNC: 0.99 MG/DL (ref 0.57–1)
CREAT UR-MCNC: 31.2 MG/DL
GLOBULIN SER CALC-MCNC: 2 GM/DL
GLUCOSE SERPL-MCNC: 127 MG/DL (ref 65–99)
HBA1C MFR BLD: 6 % (ref 4.8–5.6)
MICROALBUMIN UR-MCNC: <3 UG/ML
POTASSIUM SERPL-SCNC: 4.3 MMOL/L (ref 3.5–5.2)
PROT SERPL-MCNC: 6.3 G/DL (ref 6–8.5)
SODIUM SERPL-SCNC: 140 MMOL/L (ref 136–145)
T4 FREE SERPL-MCNC: 1.58 NG/DL (ref 0.93–1.7)
TSH SERPL DL<=0.005 MIU/L-ACNC: 1.37 MIU/ML (ref 0.27–4.2)

## 2019-07-25 ENCOUNTER — OFFICE VISIT (OUTPATIENT)
Dept: ENDOCRINOLOGY | Age: 69
End: 2019-07-25

## 2019-07-25 VITALS
HEART RATE: 65 BPM | HEIGHT: 60 IN | SYSTOLIC BLOOD PRESSURE: 136 MMHG | WEIGHT: 128.6 LBS | DIASTOLIC BLOOD PRESSURE: 70 MMHG | BODY MASS INDEX: 25.25 KG/M2

## 2019-07-25 DIAGNOSIS — E78.5 HYPERLIPIDEMIA ASSOCIATED WITH TYPE 2 DIABETES MELLITUS (HCC): ICD-10-CM

## 2019-07-25 DIAGNOSIS — E16.1 HYPERINSULINISM: ICD-10-CM

## 2019-07-25 DIAGNOSIS — Z79.4 ENCOUNTER FOR LONG-TERM (CURRENT) USE OF INSULIN (HCC): ICD-10-CM

## 2019-07-25 DIAGNOSIS — E03.9 HYPOTHYROIDISM, UNSPECIFIED TYPE: ICD-10-CM

## 2019-07-25 DIAGNOSIS — E11.69 HYPERLIPIDEMIA ASSOCIATED WITH TYPE 2 DIABETES MELLITUS (HCC): ICD-10-CM

## 2019-07-25 DIAGNOSIS — IMO0002 DIABETES MELLITUS TYPE 2, UNCONTROLLED, WITH COMPLICATIONS: Primary | ICD-10-CM

## 2019-07-25 DIAGNOSIS — IMO0002 UNCONTROLLED TYPE 2 DIABETES MELLITUS WITH BACKGROUND RETINOPATHY: ICD-10-CM

## 2019-07-25 DIAGNOSIS — M81.0 POSTMENOPAUSAL OSTEOPOROSIS: ICD-10-CM

## 2019-07-25 PROCEDURE — 95251 CONT GLUC MNTR ANALYSIS I&R: CPT | Performed by: INTERNAL MEDICINE

## 2019-07-25 PROCEDURE — 99214 OFFICE O/P EST MOD 30 MIN: CPT | Performed by: INTERNAL MEDICINE

## 2019-07-25 RX ORDER — ATORVASTATIN CALCIUM 80 MG/1
80 TABLET, FILM COATED ORAL
COMMUNITY
Start: 2019-06-10

## 2019-08-19 ENCOUNTER — LAB (OUTPATIENT)
Dept: LAB | Facility: HOSPITAL | Age: 69
End: 2019-08-19

## 2019-08-19 DIAGNOSIS — D50.9 IRON DEFICIENCY ANEMIA, UNSPECIFIED IRON DEFICIENCY ANEMIA TYPE: ICD-10-CM

## 2019-08-19 DIAGNOSIS — E53.8 B12 DEFICIENCY: ICD-10-CM

## 2019-08-19 LAB
BASOPHILS # BLD AUTO: 0.05 10*3/MM3 (ref 0–0.2)
BASOPHILS NFR BLD AUTO: 0.8 % (ref 0–1.5)
DEPRECATED RDW RBC AUTO: 41.1 FL (ref 37–54)
EOSINOPHIL # BLD AUTO: 0.51 10*3/MM3 (ref 0–0.4)
EOSINOPHIL NFR BLD AUTO: 8.3 % (ref 0.3–6.2)
ERYTHROCYTE [DISTWIDTH] IN BLOOD BY AUTOMATED COUNT: 13.1 % (ref 12.3–15.4)
FERRITIN SERPL-MCNC: 214 NG/ML (ref 13–150)
HCT VFR BLD AUTO: 37.4 % (ref 34–46.6)
HGB BLD-MCNC: 12.4 G/DL (ref 12–15.9)
HGB RETIC QN AUTO: 33.3 PG (ref 29.8–36.1)
IMM GRANULOCYTES # BLD AUTO: 0.04 10*3/MM3 (ref 0–0.05)
IMM GRANULOCYTES NFR BLD AUTO: 0.6 % (ref 0–0.5)
IMM RETICS NFR: 13.6 % (ref 3–15.8)
IRON 24H UR-MRATE: 59 MCG/DL (ref 37–145)
IRON SATN MFR SERPL: 15 % (ref 14–48)
LYMPHOCYTES # BLD AUTO: 1.18 10*3/MM3 (ref 0.7–3.1)
LYMPHOCYTES NFR BLD AUTO: 19.1 % (ref 19.6–45.3)
MCH RBC QN AUTO: 28.8 PG (ref 26.6–33)
MCHC RBC AUTO-ENTMCNC: 33.2 G/DL (ref 31.5–35.7)
MCV RBC AUTO: 87 FL (ref 79–97)
MONOCYTES # BLD AUTO: 0.52 10*3/MM3 (ref 0.1–0.9)
MONOCYTES NFR BLD AUTO: 8.4 % (ref 5–12)
NEUTROPHILS # BLD AUTO: 3.88 10*3/MM3 (ref 1.7–7)
NEUTROPHILS NFR BLD AUTO: 62.8 % (ref 42.7–76)
NRBC BLD AUTO-RTO: 0 /100 WBC (ref 0–0.2)
PLATELET # BLD AUTO: 201 10*3/MM3 (ref 140–450)
PMV BLD AUTO: 11.1 FL (ref 6–12)
RBC # BLD AUTO: 4.3 10*6/MM3 (ref 3.77–5.28)
RETICS/RBC NFR AUTO: 1.71 % (ref 0.7–1.9)
TIBC SERPL-MCNC: 396 MCG/DL (ref 249–505)
TRANSFERRIN SERPL-MCNC: 283 MG/DL (ref 200–360)
VIT B12 BLD-MCNC: 479 PG/ML (ref 211–946)
WBC NRBC COR # BLD: 6.18 10*3/MM3 (ref 3.4–10.8)

## 2019-08-19 PROCEDURE — 85046 RETICYTE/HGB CONCENTRATE: CPT | Performed by: INTERNAL MEDICINE

## 2019-08-19 PROCEDURE — 83540 ASSAY OF IRON: CPT | Performed by: INTERNAL MEDICINE

## 2019-08-19 PROCEDURE — 84466 ASSAY OF TRANSFERRIN: CPT | Performed by: INTERNAL MEDICINE

## 2019-08-19 PROCEDURE — 82728 ASSAY OF FERRITIN: CPT | Performed by: INTERNAL MEDICINE

## 2019-08-19 PROCEDURE — 85025 COMPLETE CBC W/AUTO DIFF WBC: CPT | Performed by: INTERNAL MEDICINE

## 2019-08-19 PROCEDURE — 36415 COLL VENOUS BLD VENIPUNCTURE: CPT | Performed by: INTERNAL MEDICINE

## 2019-08-19 PROCEDURE — 82607 VITAMIN B-12: CPT | Performed by: INTERNAL MEDICINE

## 2019-08-26 ENCOUNTER — APPOINTMENT (OUTPATIENT)
Dept: LAB | Facility: HOSPITAL | Age: 69
End: 2019-08-26

## 2019-08-26 ENCOUNTER — OFFICE VISIT (OUTPATIENT)
Dept: ONCOLOGY | Facility: CLINIC | Age: 69
End: 2019-08-26

## 2019-08-26 VITALS
SYSTOLIC BLOOD PRESSURE: 132 MMHG | WEIGHT: 124.9 LBS | BODY MASS INDEX: 24.52 KG/M2 | HEART RATE: 75 BPM | TEMPERATURE: 98.4 F | RESPIRATION RATE: 16 BRPM | OXYGEN SATURATION: 98 % | HEIGHT: 60 IN | DIASTOLIC BLOOD PRESSURE: 69 MMHG

## 2019-08-26 DIAGNOSIS — D50.9 IRON DEFICIENCY ANEMIA, UNSPECIFIED IRON DEFICIENCY ANEMIA TYPE: Primary | ICD-10-CM

## 2019-08-26 PROCEDURE — 99213 OFFICE O/P EST LOW 20 MIN: CPT | Performed by: INTERNAL MEDICINE

## 2019-08-26 PROCEDURE — G0463 HOSPITAL OUTPT CLINIC VISIT: HCPCS | Performed by: INTERNAL MEDICINE

## 2019-08-26 NOTE — PROGRESS NOTES
[facility] GROUP OUTPATIENT FOLLOW UP CLINIC VISIT    REASON FOR FOLLOW-UP:    1.  Iron deficiency anemia.  Her last intravenous Feraheme infusions or in October 2015.  Injectafer administered on 1/4/2019.    HISTORY OF PRESENT ILLNESS:  Shirin Washington is a 69 y.o. female who returns today for follow up of the above issue.    She continues to do about the same.  She has some abdominal bloating.  She is playing golf a couple of times per week but does report persistent fatigue.  She is a little bit stressed as she is in the middle of selling her house.  Occasional dizzy spells.      PAST MEDICAL, SURGICAL, FAMILY, AND SOCIAL HISTORIES were reviewed with the patient and in the electronic medical record, and were updated if indicated.    ALLERGIES:  Allergies   Allergen Reactions   • Topamax [Topiramate]        MEDICATIONS:  The medication list has been reviewed with the patient by the medical assistant, and the list has been updated in the electronic medical record, which I reviewed.  Medication dosages and frequencies were confirmed to be accurate.    REVIEW OF SYSTEMS:  PAIN:  See Vital Signs below.  GENERAL:  No fevers, chills, night sweats, or unintended weight loss.  Fatigue.    SKIN:  No rashes or non-healing lesions.  HEME/LYMPH:  No abnormal bleeding.  No palpable lymphadenopathy.  EYES:  No vision changes or diplopia.  ENT:  Vertigo which has improved  RESPIRATORY:  No cough, shortness of breath, hemoptysis, or wheezing.  CARDIOVASCULAR:  No chest pain or shortness of breath  GASTROINTESTINAL:  Abdominal bloating .  Some left lower quadrant discomfort  GENITOURINARY:  No dysuria or hematuria.  MUSCULOSKELETAL: Chronic back pain which has improved  NEUROLOGIC:  No dizziness, loss of consciousness, or seizures.  PSYCHIATRIC:  No depression, anxiety, or mood changes.    Vitals:    08/26/19 0941   BP: 132/69   Pulse: 75   Resp: 16   Temp: 98.4 °F (36.9 °C)   TempSrc: Oral   SpO2: 98%   Weight: 56.7 kg  "(124 lb 14.4 oz)   Height: 152.4 cm (60\")   PainSc: 0-No pain  Comment: anemia       PHYSICAL EXAMINATION:  GENERAL:  Well-developed well-nourished female; awake, alert and oriented, in no acute distress.  SKIN:  Warm and dry, without rashes, purpura, or petechiae.  HEAD:  Normocephalic, atraumatic.  EARS:  Hearing intact.  NOSE:  Septum midline.  No excoriations or nasal discharge.  MOUTH:  No stomatitis or ulcers.  Lips are normal.  THROAT:  Oropharynx without lesions or exudates.  LYMPHATICS:  No cervical, supraclavicular, axillary, or inguinal lymphadenopathy.  CHEST:  Lungs are clear to auscultation bilaterally.  No wheezes, rales, or rhonchi.  HEART:  Regular rate; normal rhythm.  No murmurs, gallops or rubs.  ABDOMEN:  Soft, mild diffuse tenderness to palpation, blood glucose sensor in place in the right lower quadrant, normal active bowel sounds, nondistended  EXTREMITIES:  No clubbing, cyanosis, or edema.  NEUROLOGICAL:  No focal neurologic deficits.    DIAGNOSTIC DATA:  Results for orders placed or performed in visit on 08/19/19   Iron Profile   Result Value Ref Range    Iron 59 37 - 145 mcg/dL    Iron Saturation 15 14 - 48 %    Transferrin 283 200 - 360 mg/dL    TIBC 396 249 - 505 mcg/dL   Retic With IRF & RET-He   Result Value Ref Range    Immature Reticulocyte Fraction 13.6 3.0 - 15.8 %    Reticulocyte % 1.71 0.70 - 1.90 %    Reticulocyte Hgb 33.3 29.8 - 36.1 pg   Ferritin   Result Value Ref Range    Ferritin 214.00 (H) 13.00 - 150.00 ng/mL   Vitamin B12   Result Value Ref Range    Vitamin B-12 479 211 - 946 pg/mL   CBC Auto Differential   Result Value Ref Range    WBC 6.18 3.40 - 10.80 10*3/mm3    RBC 4.30 3.77 - 5.28 10*6/mm3    Hemoglobin 12.4 12.0 - 15.9 g/dL    Hematocrit 37.4 34.0 - 46.6 %    MCV 87.0 79.0 - 97.0 fL    MCH 28.8 26.6 - 33.0 pg    MCHC 33.2 31.5 - 35.7 g/dL    RDW 13.1 12.3 - 15.4 %    RDW-SD 41.1 37.0 - 54.0 fl    MPV 11.1 6.0 - 12.0 fL    Platelets 201 140 - 450 10*3/mm3    " Neutrophil % 62.8 42.7 - 76.0 %    Lymphocyte % 19.1 (L) 19.6 - 45.3 %    Monocyte % 8.4 5.0 - 12.0 %    Eosinophil % 8.3 (H) 0.3 - 6.2 %    Basophil % 0.8 0.0 - 1.5 %    Immature Grans % 0.6 (H) 0.0 - 0.5 %    Neutrophils, Absolute 3.88 1.70 - 7.00 10*3/mm3    Lymphocytes, Absolute 1.18 0.70 - 3.10 10*3/mm3    Monocytes, Absolute 0.52 0.10 - 0.90 10*3/mm3    Eosinophils, Absolute 0.51 (H) 0.00 - 0.40 10*3/mm3    Basophils, Absolute 0.05 0.00 - 0.20 10*3/mm3    Immature Grans, Absolute 0.04 0.00 - 0.05 10*3/mm3    nRBC 0.0 0.0 - 0.2 /100 WBC         ASSESSMENT:  This is a 69 y.o. female with:  1.  History of iron deficiency anemia: She required intravenous Feraheme last in October 2015 and we gave 2 more doses in September and October 2017.  No obvious GI blood loss.  I suspect she has some malabsorption.  She has symptoms consistent with gastroparesis.  She also received vitamin B12 injections in the past and she received 2 injections with her Feraheme infusions.  At the end of 2018 her ferritin had dropped considerably.  Therefore, we proceeded with one dose of Injectafer.  She is no longer on her vitamin B12 supplement.    2.  Fatigue: Improved on a thyroid supplement although persists    3.  Gastroparesis    4.  B12 deficiency: Her B12 level has dropped some although it remains normal.  I previously advised her to resume her vitamin B12 supplement several days per week.    5.  Chronic back pain     PLAN:  1.  I will see her back in 4 months for follow-up with labs including a CBC, reticulocyte count, ferritin, iron profile, and vitamin B12 level done prior to that.    I advised her to notify us if she wants her blood counts checked prior to that.

## 2019-10-24 ENCOUNTER — TELEPHONE (OUTPATIENT)
Dept: ENDOCRINOLOGY | Age: 69
End: 2019-10-24

## 2019-10-24 ENCOUNTER — RESULTS ENCOUNTER (OUTPATIENT)
Dept: ENDOCRINOLOGY | Age: 69
End: 2019-10-24

## 2019-10-24 DIAGNOSIS — E03.9 HYPOTHYROIDISM, UNSPECIFIED TYPE: ICD-10-CM

## 2019-10-24 DIAGNOSIS — IMO0002 DIABETES MELLITUS TYPE 2, UNCONTROLLED, WITH COMPLICATIONS: Primary | ICD-10-CM

## 2019-10-24 DIAGNOSIS — IMO0002 DIABETES MELLITUS TYPE 2, UNCONTROLLED, WITH COMPLICATIONS: ICD-10-CM

## 2019-10-24 NOTE — TELEPHONE ENCOUNTER
Aye with [employer] ph. [phone] direct line, fax [phone] checking status of requests faxed multiple times since 10-8-19 for chart notes and Dexcom CMN.

## 2019-10-26 LAB
ALBUMIN SERPL-MCNC: 4.3 G/DL (ref 3.5–5.2)
ALBUMIN/CREAT UR: 8.6 MG/G CREAT (ref 0–30)
ALBUMIN/GLOB SERPL: 2.3 G/DL
ALP SERPL-CCNC: 33 U/L (ref 39–117)
ALT SERPL-CCNC: 24 U/L (ref 1–33)
AST SERPL-CCNC: 20 U/L (ref 1–32)
BILIRUB SERPL-MCNC: 0.6 MG/DL (ref 0.2–1.2)
BUN SERPL-MCNC: 21 MG/DL (ref 8–23)
BUN/CREAT SERPL: 20.6 (ref 7–25)
CALCIUM SERPL-MCNC: 9.7 MG/DL (ref 8.6–10.5)
CHLORIDE SERPL-SCNC: 103 MMOL/L (ref 98–107)
CO2 SERPL-SCNC: 27.1 MMOL/L (ref 22–29)
CREAT SERPL-MCNC: 1.02 MG/DL (ref 0.57–1)
CREAT UR-MCNC: 214.5 MG/DL
GLOBULIN SER CALC-MCNC: 1.9 GM/DL
GLUCOSE SERPL-MCNC: 128 MG/DL (ref 65–99)
HBA1C MFR BLD: 6.4 % (ref 4.8–5.6)
MICROALBUMIN UR-MCNC: 18.5 UG/ML
POTASSIUM SERPL-SCNC: 4.7 MMOL/L (ref 3.5–5.2)
PROT SERPL-MCNC: 6.2 G/DL (ref 6–8.5)
SODIUM SERPL-SCNC: 141 MMOL/L (ref 136–145)
T4 FREE SERPL-MCNC: 1.6 NG/DL (ref 0.93–1.7)
TSH SERPL DL<=0.005 MIU/L-ACNC: 1.78 UIU/ML (ref 0.27–4.2)

## 2019-11-01 ENCOUNTER — OFFICE VISIT (OUTPATIENT)
Dept: ENDOCRINOLOGY | Age: 69
End: 2019-11-01

## 2019-11-01 VITALS
BODY MASS INDEX: 24.35 KG/M2 | DIASTOLIC BLOOD PRESSURE: 70 MMHG | HEART RATE: 61 BPM | SYSTOLIC BLOOD PRESSURE: 140 MMHG | WEIGHT: 124 LBS | HEIGHT: 60 IN

## 2019-11-01 DIAGNOSIS — E03.9 HYPOTHYROIDISM, UNSPECIFIED TYPE: Primary | ICD-10-CM

## 2019-11-01 DIAGNOSIS — IMO0002 UNCONTROLLED TYPE 2 DIABETES MELLITUS WITH BACKGROUND RETINOPATHY: ICD-10-CM

## 2019-11-01 DIAGNOSIS — E78.5 HYPERLIPIDEMIA ASSOCIATED WITH TYPE 2 DIABETES MELLITUS (HCC): ICD-10-CM

## 2019-11-01 DIAGNOSIS — E16.1 HYPERINSULINISM: ICD-10-CM

## 2019-11-01 DIAGNOSIS — IMO0002 TYPE II DIABETES MELLITUS WITH NEUROLOGICAL MANIFESTATIONS, UNCONTROLLED: ICD-10-CM

## 2019-11-01 DIAGNOSIS — Z79.4 ENCOUNTER FOR LONG-TERM (CURRENT) USE OF INSULIN (HCC): ICD-10-CM

## 2019-11-01 DIAGNOSIS — IMO0002 DIABETES MELLITUS TYPE 2, UNCONTROLLED, WITH COMPLICATIONS: ICD-10-CM

## 2019-11-01 DIAGNOSIS — E11.69 HYPERLIPIDEMIA ASSOCIATED WITH TYPE 2 DIABETES MELLITUS (HCC): ICD-10-CM

## 2019-11-01 PROCEDURE — 99214 OFFICE O/P EST MOD 30 MIN: CPT | Performed by: INTERNAL MEDICINE

## 2019-11-01 PROCEDURE — 95251 CONT GLUC MNTR ANALYSIS I&R: CPT | Performed by: INTERNAL MEDICINE

## 2019-11-11 RX ORDER — INSULIN DEGLUDEC 200 U/ML
30 INJECTION, SOLUTION SUBCUTANEOUS DAILY
Qty: 9 ML | Refills: 1 | Status: SHIPPED | OUTPATIENT
Start: 2019-11-11 | End: 2020-07-09

## 2019-12-02 ENCOUNTER — INFUSION (OUTPATIENT)
Dept: ONCOLOGY | Facility: HOSPITAL | Age: 69
End: 2019-12-02

## 2019-12-02 VITALS
BODY MASS INDEX: 24.61 KG/M2 | SYSTOLIC BLOOD PRESSURE: 138 MMHG | HEART RATE: 76 BPM | OXYGEN SATURATION: 96 % | TEMPERATURE: 97.9 F | DIASTOLIC BLOOD PRESSURE: 63 MMHG | WEIGHT: 126 LBS

## 2019-12-02 DIAGNOSIS — M81.0 OSTEOPOROSIS, UNSPECIFIED OSTEOPOROSIS TYPE, UNSPECIFIED PATHOLOGICAL FRACTURE PRESENCE: ICD-10-CM

## 2019-12-02 DIAGNOSIS — M81.0 OSTEOPOROSIS, POST-MENOPAUSAL: Primary | ICD-10-CM

## 2019-12-02 PROCEDURE — 25010000002 DENOSUMAB 60 MG/ML SOLUTION PREFILLED SYRINGE: Performed by: OBSTETRICS & GYNECOLOGY

## 2019-12-02 PROCEDURE — 96372 THER/PROPH/DIAG INJ SC/IM: CPT | Performed by: NURSE PRACTITIONER

## 2019-12-02 RX ADMIN — DENOSUMAB 60 MG: 60 INJECTION SUBCUTANEOUS at 14:35

## 2019-12-02 NOTE — NURSING NOTE
Arrived ambulatory  for prolia injection. Indication and side effects reviewed. Denies recent dental work. Labs and medications verified. Prolia administered in left   arm without incidence. Instructed to call prescribing MD for any concerns or questions and instructed on how to schedule future appts.  Pt vu and discharged ambulatory.   ambulatory

## 2019-12-09 ENCOUNTER — LAB (OUTPATIENT)
Dept: LAB | Facility: HOSPITAL | Age: 69
End: 2019-12-09

## 2019-12-09 DIAGNOSIS — D50.9 IRON DEFICIENCY ANEMIA, UNSPECIFIED IRON DEFICIENCY ANEMIA TYPE: ICD-10-CM

## 2019-12-09 LAB
BASOPHILS # BLD AUTO: 0.06 10*3/MM3 (ref 0–0.2)
BASOPHILS NFR BLD AUTO: 0.8 % (ref 0–1.5)
DEPRECATED RDW RBC AUTO: 46 FL (ref 37–54)
EOSINOPHIL # BLD AUTO: 0.57 10*3/MM3 (ref 0–0.4)
EOSINOPHIL NFR BLD AUTO: 7.3 % (ref 0.3–6.2)
ERYTHROCYTE [DISTWIDTH] IN BLOOD BY AUTOMATED COUNT: 14.3 % (ref 12.3–15.4)
FERRITIN SERPL-MCNC: 160.3 NG/ML (ref 13–150)
HCT VFR BLD AUTO: 38.8 % (ref 34–46.6)
HGB BLD-MCNC: 12.4 G/DL (ref 12–15.9)
HGB RETIC QN AUTO: 32.3 PG (ref 29.8–36.1)
IMM GRANULOCYTES # BLD AUTO: 0.06 10*3/MM3 (ref 0–0.05)
IMM GRANULOCYTES NFR BLD AUTO: 0.8 % (ref 0–0.5)
IMM RETICS NFR: 14.5 % (ref 3–15.8)
IRON 24H UR-MRATE: 60 MCG/DL (ref 37–145)
IRON SATN MFR SERPL: 13 % (ref 14–48)
LYMPHOCYTES # BLD AUTO: 1.24 10*3/MM3 (ref 0.7–3.1)
LYMPHOCYTES NFR BLD AUTO: 15.8 % (ref 19.6–45.3)
MCH RBC QN AUTO: 28.5 PG (ref 26.6–33)
MCHC RBC AUTO-ENTMCNC: 32 G/DL (ref 31.5–35.7)
MCV RBC AUTO: 89.2 FL (ref 79–97)
MONOCYTES # BLD AUTO: 0.58 10*3/MM3 (ref 0.1–0.9)
MONOCYTES NFR BLD AUTO: 7.4 % (ref 5–12)
NEUTROPHILS # BLD AUTO: 5.34 10*3/MM3 (ref 1.7–7)
NEUTROPHILS NFR BLD AUTO: 67.9 % (ref 42.7–76)
NRBC BLD AUTO-RTO: 0 /100 WBC (ref 0–0.2)
PLATELET # BLD AUTO: 216 10*3/MM3 (ref 140–450)
PMV BLD AUTO: 10.4 FL (ref 6–12)
RBC # BLD AUTO: 4.35 10*6/MM3 (ref 3.77–5.28)
RETICS/RBC NFR AUTO: 2.07 % (ref 0.7–1.9)
TIBC SERPL-MCNC: 462 MCG/DL (ref 249–505)
TRANSFERRIN SERPL-MCNC: 330 MG/DL (ref 200–360)
VIT B12 BLD-MCNC: 368 PG/ML (ref 211–946)
WBC NRBC COR # BLD: 7.85 10*3/MM3 (ref 3.4–10.8)

## 2019-12-09 PROCEDURE — 84466 ASSAY OF TRANSFERRIN: CPT

## 2019-12-09 PROCEDURE — 83540 ASSAY OF IRON: CPT

## 2019-12-09 PROCEDURE — 82728 ASSAY OF FERRITIN: CPT

## 2019-12-09 PROCEDURE — 82607 VITAMIN B-12: CPT | Performed by: INTERNAL MEDICINE

## 2019-12-09 PROCEDURE — 85046 RETICYTE/HGB CONCENTRATE: CPT

## 2019-12-09 PROCEDURE — 36415 COLL VENOUS BLD VENIPUNCTURE: CPT

## 2019-12-09 PROCEDURE — 85025 COMPLETE CBC W/AUTO DIFF WBC: CPT

## 2019-12-16 ENCOUNTER — APPOINTMENT (OUTPATIENT)
Dept: LAB | Facility: HOSPITAL | Age: 69
End: 2019-12-16

## 2019-12-16 ENCOUNTER — OFFICE VISIT (OUTPATIENT)
Dept: ONCOLOGY | Facility: CLINIC | Age: 69
End: 2019-12-16

## 2019-12-16 VITALS
RESPIRATION RATE: 16 BRPM | HEIGHT: 60 IN | HEART RATE: 69 BPM | DIASTOLIC BLOOD PRESSURE: 79 MMHG | WEIGHT: 126.5 LBS | BODY MASS INDEX: 24.84 KG/M2 | OXYGEN SATURATION: 98 % | SYSTOLIC BLOOD PRESSURE: 131 MMHG | TEMPERATURE: 98.6 F

## 2019-12-16 DIAGNOSIS — D50.9 IRON DEFICIENCY ANEMIA, UNSPECIFIED IRON DEFICIENCY ANEMIA TYPE: Primary | ICD-10-CM

## 2019-12-16 PROCEDURE — 99213 OFFICE O/P EST LOW 20 MIN: CPT | Performed by: INTERNAL MEDICINE

## 2019-12-16 PROCEDURE — G0463 HOSPITAL OUTPT CLINIC VISIT: HCPCS | Performed by: INTERNAL MEDICINE

## 2019-12-16 RX ORDER — SULFAMETHOXAZOLE AND TRIMETHOPRIM 800; 160 MG/1; MG/1
TABLET ORAL
COMMUNITY
Start: 2019-11-01 | End: 2019-12-16

## 2019-12-16 NOTE — PROGRESS NOTES
[facility] GROUP OUTPATIENT FOLLOW UP CLINIC VISIT    REASON FOR FOLLOW-UP:    1.  Iron deficiency anemia.  Her last intravenous Feraheme infusions or in October 2015.  Injectafer administered on 1/4/2019.    HISTORY OF PRESENT ILLNESS:  Shirin Washington is a 69 y.o. female who returns today for follow up of the above issue.    She has been having some more gastrointestinal issues lately with alternating diarrhea and constipation.  She has had more bowel gas than usual.  No bleeding.  No fevers or chills.  Pain is overall better and acceptable.  She did not complain of any vertigo today.      PAST MEDICAL, SURGICAL, FAMILY, AND SOCIAL HISTORIES were reviewed with the patient and in the electronic medical record, and were updated if indicated.    ALLERGIES:  Allergies   Allergen Reactions   • Topamax [Topiramate] Unknown - Low Severity       MEDICATIONS:  The medication list has been reviewed with the patient by the medical assistant, and the list has been updated in the electronic medical record, which I reviewed.  Medication dosages and frequencies were confirmed to be accurate.    REVIEW OF SYSTEMS:  PAIN:  See Vital Signs below.  GENERAL:  No fevers, chills, night sweats, or unintended weight loss.  Fatigue.    SKIN:  No rashes or non-healing lesions.  HEME/LYMPH:  No abnormal bleeding.  No palpable lymphadenopathy.  EYES:  No vision changes or diplopia.  ENT:  Vertigo which has improved  RESPIRATORY:  No cough, shortness of breath, hemoptysis, or wheezing.  CARDIOVASCULAR:  No chest pain or shortness of breath  GASTROINTESTINAL:  Abdominal bloating .   GENITOURINARY:  No dysuria or hematuria.  MUSCULOSKELETAL: Chronic back pain which has improved  NEUROLOGIC:  No dizziness, loss of consciousness, or seizures.  PSYCHIATRIC:  No depression, anxiety, or mood changes.    Vitals:    12/16/19 1011   BP: 131/79   Pulse: 69   Resp: 16   Temp: 98.6 °F (37 °C)   TempSrc: Oral   SpO2: 98%   Weight: 57.4 kg (126 lb 8  "oz)   Height: 152.4 cm (60\")   PainSc: 0-No pain  Comment: anemia       PHYSICAL EXAMINATION:  GENERAL:  Well-developed well-nourished female; awake, alert and oriented, in no acute distress.  SKIN:  Warm and dry, without rashes, purpura, or petechiae.  HEAD:  Normocephalic, atraumatic.  EARS:  Hearing intact.  NOSE:  Septum midline.  No excoriations or nasal discharge.  MOUTH:  No stomatitis or ulcers.  Lips are normal.  THROAT:  Oropharynx without lesions or exudates.  LYMPHATICS:  No cervical, supraclavicular, axillary, or inguinal lymphadenopathy.  CHEST:  Lungs are clear to auscultation bilaterally.  No wheezes, rales, or rhonchi.  HEART:  Regular rate; normal rhythm.  No murmurs, gallops or rubs.  ABDOMEN:  Soft, mild diffuse tenderness to palpation, blood glucose sensor in place, normal active bowel sounds, nondistended   EXTREMITIES:  No clubbing, cyanosis, or edema.  NEUROLOGICAL:  No focal neurologic deficits.    DIAGNOSTIC DATA:  Results for orders placed or performed in visit on 12/09/19   Ferritin   Result Value Ref Range    Ferritin 160.30 (H) 13.00 - 150.00 ng/mL   Iron Profile   Result Value Ref Range    Iron 60 37 - 145 mcg/dL    Iron Saturation 13 (L) 14 - 48 %    Transferrin 330 200 - 360 mg/dL    TIBC 462 249 - 505 mcg/dL   Retic With IRF & RET-He   Result Value Ref Range    Immature Reticulocyte Fraction 14.5 3.0 - 15.8 %    Reticulocyte % 2.07 (H) 0.70 - 1.90 %    Reticulocyte Hgb 32.3 29.8 - 36.1 pg   Vitamin B12   Result Value Ref Range    Vitamin B-12 368 211 - 946 pg/mL   CBC Auto Differential   Result Value Ref Range    WBC 7.85 3.40 - 10.80 10*3/mm3    RBC 4.35 3.77 - 5.28 10*6/mm3    Hemoglobin 12.4 12.0 - 15.9 g/dL    Hematocrit 38.8 34.0 - 46.6 %    MCV 89.2 79.0 - 97.0 fL    MCH 28.5 26.6 - 33.0 pg    MCHC 32.0 31.5 - 35.7 g/dL    RDW 14.3 12.3 - 15.4 %    RDW-SD 46.0 37.0 - 54.0 fl    MPV 10.4 6.0 - 12.0 fL    Platelets 216 140 - 450 10*3/mm3    Neutrophil % 67.9 42.7 - 76.0 %    " Lymphocyte % 15.8 (L) 19.6 - 45.3 %    Monocyte % 7.4 5.0 - 12.0 %    Eosinophil % 7.3 (H) 0.3 - 6.2 %    Basophil % 0.8 0.0 - 1.5 %    Immature Grans % 0.8 (H) 0.0 - 0.5 %    Neutrophils, Absolute 5.34 1.70 - 7.00 10*3/mm3    Lymphocytes, Absolute 1.24 0.70 - 3.10 10*3/mm3    Monocytes, Absolute 0.58 0.10 - 0.90 10*3/mm3    Eosinophils, Absolute 0.57 (H) 0.00 - 0.40 10*3/mm3    Basophils, Absolute 0.06 0.00 - 0.20 10*3/mm3    Immature Grans, Absolute 0.06 (H) 0.00 - 0.05 10*3/mm3    nRBC 0.0 0.0 - 0.2 /100 WBC         ASSESSMENT:  This is a 69 y.o. female with:  1.  History of iron deficiency anemia: She required intravenous Feraheme last in October 2015 and we gave 2 more doses in September and October 2017.  No obvious GI blood loss.  I suspect she has some malabsorption.  She has symptoms consistent with gastroparesis.  She also received vitamin B12 injections in the past and she received 2 injections with her Feraheme infusions.  At the end of 2018 her ferritin had dropped considerably.  Therefore, we proceeded with one dose of Injectafer.     2.  Fatigue: Improved on a thyroid supplement although persists    3.  Gastroparesis    4.  B12 deficiency: She continues an oral supplement    5.  Chronic back pain     PLAN:  1.  I will see her back in 4 months for follow-up with labs including a CBC, reticulocyte count, ferritin, iron profile, and vitamin B12 level done 1 week prior to that.

## 2019-12-24 ENCOUNTER — TELEPHONE (OUTPATIENT)
Dept: ENDOCRINOLOGY | Age: 69
End: 2019-12-24

## 2019-12-24 DIAGNOSIS — IMO0002 UNCONTROLLED TYPE 2 DIABETES MELLITUS WITH COMPLICATION, WITH LONG-TERM CURRENT USE OF INSULIN: ICD-10-CM

## 2019-12-24 NOTE — TELEPHONE ENCOUNTER
PATIENT LEFT VOICE MAIL  OUT OF HER INSULIN NOVOLOG    CAN YOU SEND A SCRIPT TO [facility]    IM SENDING IT TO YOU BECAUSE MADELINE IS NOT HERE AND THE PATIENT HAS BEEN OUT SINCE SAT    THANK YOU

## 2020-01-24 ENCOUNTER — TELEPHONE (OUTPATIENT)
Dept: ENDOCRINOLOGY | Age: 70
End: 2020-01-24

## 2020-01-24 NOTE — TELEPHONE ENCOUNTER
Aye with [employer] left a voice mail stating that they had faxed several times needing ICD and notes for insulin pump supplies-fax number is [phone]

## 2020-02-24 ENCOUNTER — RESULTS ENCOUNTER (OUTPATIENT)
Dept: ENDOCRINOLOGY | Age: 70
End: 2020-02-24

## 2020-02-24 DIAGNOSIS — IMO0002 UNCONTROLLED TYPE 2 DIABETES MELLITUS WITH COMPLICATION, WITH LONG-TERM CURRENT USE OF INSULIN: ICD-10-CM

## 2020-02-24 DIAGNOSIS — E03.9 HYPOTHYROIDISM, UNSPECIFIED TYPE: ICD-10-CM

## 2020-02-24 DIAGNOSIS — E03.9 HYPOTHYROIDISM, UNSPECIFIED TYPE: Primary | ICD-10-CM

## 2020-04-13 ENCOUNTER — LAB (OUTPATIENT)
Dept: LAB | Facility: HOSPITAL | Age: 70
End: 2020-04-13

## 2020-04-13 DIAGNOSIS — D50.9 IRON DEFICIENCY ANEMIA, UNSPECIFIED IRON DEFICIENCY ANEMIA TYPE: ICD-10-CM

## 2020-04-13 LAB
ALBUMIN SERPL-MCNC: 3.9 G/DL (ref 3.5–5.2)
ALBUMIN/GLOB SERPL: 1.6 G/DL (ref 1.1–2.4)
ALP SERPL-CCNC: 31 U/L (ref 38–116)
ALT SERPL W P-5'-P-CCNC: 20 U/L (ref 0–33)
ANION GAP SERPL CALCULATED.3IONS-SCNC: 11 MMOL/L (ref 5–15)
AST SERPL-CCNC: 18 U/L (ref 0–32)
BASOPHILS # BLD AUTO: 0.04 10*3/MM3 (ref 0–0.2)
BASOPHILS NFR BLD AUTO: 0.7 % (ref 0–1.5)
BILIRUB SERPL-MCNC: 0.4 MG/DL (ref 0.2–1.2)
BUN BLD-MCNC: 23 MG/DL (ref 6–20)
BUN/CREAT SERPL: 24.7 (ref 7.3–30)
CALCIUM SPEC-SCNC: 9.3 MG/DL (ref 8.5–10.2)
CHLORIDE SERPL-SCNC: 103 MMOL/L (ref 98–107)
CO2 SERPL-SCNC: 25 MMOL/L (ref 22–29)
CREAT BLD-MCNC: 0.93 MG/DL (ref 0.6–1.1)
DEPRECATED RDW RBC AUTO: 43.8 FL (ref 37–54)
EOSINOPHIL # BLD AUTO: 0.53 10*3/MM3 (ref 0–0.4)
EOSINOPHIL NFR BLD AUTO: 9 % (ref 0.3–6.2)
ERYTHROCYTE [DISTWIDTH] IN BLOOD BY AUTOMATED COUNT: 14.1 % (ref 12.3–15.4)
FERRITIN SERPL-MCNC: 64 NG/ML (ref 13–150)
GFR SERPL CREATININE-BSD FRML MDRD: 59 ML/MIN/1.73
GLOBULIN UR ELPH-MCNC: 2.5 GM/DL (ref 1.8–3.5)
GLUCOSE BLD-MCNC: 158 MG/DL (ref 74–124)
HCT VFR BLD AUTO: 38.1 % (ref 34–46.6)
HGB BLD-MCNC: 12.2 G/DL (ref 12–15.9)
HGB RETIC QN AUTO: 32.3 PG (ref 29.8–36.1)
IMM GRANULOCYTES # BLD AUTO: 0.03 10*3/MM3 (ref 0–0.05)
IMM GRANULOCYTES NFR BLD AUTO: 0.5 % (ref 0–0.5)
IMM RETICS NFR: 14.1 % (ref 3–15.8)
IRON 24H UR-MRATE: 50 MCG/DL (ref 37–145)
IRON SATN MFR SERPL: 11 % (ref 14–48)
LYMPHOCYTES # BLD AUTO: 1.27 10*3/MM3 (ref 0.7–3.1)
LYMPHOCYTES NFR BLD AUTO: 21.5 % (ref 19.6–45.3)
MCH RBC QN AUTO: 27.4 PG (ref 26.6–33)
MCHC RBC AUTO-ENTMCNC: 32 G/DL (ref 31.5–35.7)
MCV RBC AUTO: 85.6 FL (ref 79–97)
MONOCYTES # BLD AUTO: 0.46 10*3/MM3 (ref 0.1–0.9)
MONOCYTES NFR BLD AUTO: 7.8 % (ref 5–12)
NEUTROPHILS # BLD AUTO: 3.58 10*3/MM3 (ref 1.7–7)
NEUTROPHILS NFR BLD AUTO: 60.5 % (ref 42.7–76)
NRBC BLD AUTO-RTO: 0 /100 WBC (ref 0–0.2)
PLATELET # BLD AUTO: 206 10*3/MM3 (ref 140–450)
PMV BLD AUTO: 10.8 FL (ref 6–12)
POTASSIUM BLD-SCNC: 4.4 MMOL/L (ref 3.5–4.7)
PROT SERPL-MCNC: 6.4 G/DL (ref 6.3–8)
RBC # BLD AUTO: 4.45 10*6/MM3 (ref 3.77–5.28)
RETICS/RBC NFR AUTO: 1.69 % (ref 0.7–1.9)
SODIUM BLD-SCNC: 139 MMOL/L (ref 134–145)
TIBC SERPL-MCNC: 465 MCG/DL (ref 249–505)
TRANSFERRIN SERPL-MCNC: 332 MG/DL (ref 200–360)
VIT B12 BLD-MCNC: 270 PG/ML (ref 211–946)
WBC NRBC COR # BLD: 5.91 10*3/MM3 (ref 3.4–10.8)

## 2020-04-13 PROCEDURE — 82607 VITAMIN B-12: CPT | Performed by: INTERNAL MEDICINE

## 2020-04-13 PROCEDURE — 80053 COMPREHEN METABOLIC PANEL: CPT

## 2020-04-13 PROCEDURE — 84466 ASSAY OF TRANSFERRIN: CPT

## 2020-04-13 PROCEDURE — 36415 COLL VENOUS BLD VENIPUNCTURE: CPT

## 2020-04-13 PROCEDURE — 83540 ASSAY OF IRON: CPT

## 2020-04-13 PROCEDURE — 85025 COMPLETE CBC W/AUTO DIFF WBC: CPT

## 2020-04-13 PROCEDURE — 82728 ASSAY OF FERRITIN: CPT

## 2020-04-13 PROCEDURE — 85046 RETICYTE/HGB CONCENTRATE: CPT

## 2020-04-19 DIAGNOSIS — IMO0002 UNCONTROLLED TYPE 2 DIABETES MELLITUS WITH COMPLICATION, WITH LONG-TERM CURRENT USE OF INSULIN: ICD-10-CM

## 2020-04-20 ENCOUNTER — APPOINTMENT (OUTPATIENT)
Dept: LAB | Facility: HOSPITAL | Age: 70
End: 2020-04-20

## 2020-04-20 ENCOUNTER — TELEMEDICINE (OUTPATIENT)
Dept: ONCOLOGY | Facility: CLINIC | Age: 70
End: 2020-04-20

## 2020-04-20 DIAGNOSIS — D50.9 IRON DEFICIENCY ANEMIA, UNSPECIFIED IRON DEFICIENCY ANEMIA TYPE: ICD-10-CM

## 2020-04-20 DIAGNOSIS — E53.8 B12 DEFICIENCY: Primary | ICD-10-CM

## 2020-04-20 PROCEDURE — 99214 OFFICE O/P EST MOD 30 MIN: CPT | Performed by: INTERNAL MEDICINE

## 2020-04-20 RX ORDER — INSULIN ASPART 100 [IU]/ML
INJECTION, SOLUTION INTRAVENOUS; SUBCUTANEOUS
Qty: 15 PEN | Refills: 0 | Status: SHIPPED | OUTPATIENT
Start: 2020-04-20 | End: 2020-04-21 | Stop reason: SDUPTHER

## 2020-04-20 NOTE — PROGRESS NOTES
[facility] GROUP OUTPATIENT FOLLOW UP CLINIC VISIT    REASON FOR FOLLOW-UP:    1.  Iron deficiency anemia.  Her last intravenous Feraheme infusions or in October 2015.  Injectafer administered on 1/4/2019.    HISTORY OF PRESENT ILLNESS:  Shirin Washington is a 69 y.o. female who returns today for follow up of the above issue.    She reports some fatigue.  She reports some mild alopecia.  She reports some mild weakness in her fingernails.  She denies any bleeding.    PAST MEDICAL, SURGICAL, FAMILY, AND SOCIAL HISTORIES were reviewed with the patient and in the electronic medical record, and were updated if indicated.    ALLERGIES:  Allergies   Allergen Reactions   • Topamax [Topiramate] Unknown - Low Severity       MEDICATIONS:  The medication list has been reviewed with the patient by the medical assistant, and the list has been updated in the electronic medical record, which I reviewed.  Medication dosages and frequencies were confirmed to be accurate.    REVIEW OF SYSTEMS:  PAIN:  See Vital Signs below.  GENERAL:  No fevers, chills, night sweats, or unintended weight loss.  Fatigue.    SKIN:  No rashes or non-healing lesions.  Mild alopecia  HEME/LYMPH:  No abnormal bleeding.  No palpable lymphadenopathy.  EYES:  No vision changes or diplopia.  ENT:  Vertigo which has improved  RESPIRATORY:  No cough, shortness of breath, hemoptysis, or wheezing.  CARDIOVASCULAR:  No chest pain or shortness of breath  GASTROINTESTINAL:  Abdominal bloating .   GENITOURINARY:  No dysuria or hematuria.  MUSCULOSKELETAL: Chronic back pain which has improved  NEUROLOGIC:  No dizziness, loss of consciousness, or seizures.  PSYCHIATRIC:  No depression, anxiety, or mood changes.    There were no vitals filed for this visit.    PHYSICAL EXAMINATION:  GENERAL:  Well-developed well-nourished female; awake, alert and oriented, in no acute distress.  Seen with a video visit today.      DIAGNOSTIC DATA:  Results for orders placed or  performed in visit on 04/13/20   Comprehensive Metabolic Panel   Result Value Ref Range    Glucose 158 (H) 74 - 124 mg/dL    BUN 23 (H) 6 - 20 mg/dL    Creatinine 0.93 0.60 - 1.10 mg/dL    Sodium 139 134 - 145 mmol/L    Potassium 4.4 3.5 - 4.7 mmol/L    Chloride 103 98 - 107 mmol/L    CO2 25.0 22.0 - 29.0 mmol/L    Calcium 9.3 8.5 - 10.2 mg/dL    Total Protein 6.4 6.3 - 8.0 g/dL    Albumin 3.90 3.50 - 5.20 g/dL    ALT (SGPT) 20 0 - 33 U/L    AST (SGOT) 18 0 - 32 U/L    Alkaline Phosphatase 31 (L) 38 - 116 U/L    Total Bilirubin 0.4 0.2 - 1.2 mg/dL    eGFR Non African Amer 59 (L) >60 mL/min/1.73    Globulin 2.5 1.8 - 3.5 gm/dL    A/G Ratio 1.6 1.1 - 2.4 g/dL    BUN/Creatinine Ratio 24.7 7.3 - 30.0    Anion Gap 11.0 5.0 - 15.0 mmol/L   Retic With IRF & RET-He   Result Value Ref Range    Immature Reticulocyte Fraction 14.1 3.0 - 15.8 %    Reticulocyte % 1.69 0.70 - 1.90 %    Reticulocyte Hgb 32.3 29.8 - 36.1 pg   Iron Profile   Result Value Ref Range    Iron 50 37 - 145 mcg/dL    Iron Saturation 11 (L) 14 - 48 %    Transferrin 332 200 - 360 mg/dL    TIBC 465 249 - 505 mcg/dL   Ferritin   Result Value Ref Range    Ferritin 64.00 13.00 - 150.00 ng/mL   Vitamin B12   Result Value Ref Range    Vitamin B-12 270 211 - 946 pg/mL   CBC Auto Differential   Result Value Ref Range    WBC 5.91 3.40 - 10.80 10*3/mm3    RBC 4.45 3.77 - 5.28 10*6/mm3    Hemoglobin 12.2 12.0 - 15.9 g/dL    Hematocrit 38.1 34.0 - 46.6 %    MCV 85.6 79.0 - 97.0 fL    MCH 27.4 26.6 - 33.0 pg    MCHC 32.0 31.5 - 35.7 g/dL    RDW 14.1 12.3 - 15.4 %    RDW-SD 43.8 37.0 - 54.0 fl    MPV 10.8 6.0 - 12.0 fL    Platelets 206 140 - 450 10*3/mm3    Neutrophil % 60.5 42.7 - 76.0 %    Lymphocyte % 21.5 19.6 - 45.3 %    Monocyte % 7.8 5.0 - 12.0 %    Eosinophil % 9.0 (H) 0.3 - 6.2 %    Basophil % 0.7 0.0 - 1.5 %    Immature Grans % 0.5 0.0 - 0.5 %    Neutrophils, Absolute 3.58 1.70 - 7.00 10*3/mm3    Lymphocytes, Absolute 1.27 0.70 - 3.10 10*3/mm3    Monocytes,  Absolute 0.46 0.10 - 0.90 10*3/mm3    Eosinophils, Absolute 0.53 (H) 0.00 - 0.40 10*3/mm3    Basophils, Absolute 0.04 0.00 - 0.20 10*3/mm3    Immature Grans, Absolute 0.03 0.00 - 0.05 10*3/mm3    nRBC 0.0 0.0 - 0.2 /100 WBC         ASSESSMENT:  This is a 69 y.o. female with:  1.  History of iron deficiency anemia: She required intravenous Feraheme last in October 2015 and we gave 2 more doses in September and October 2017.  No obvious GI blood loss.  I suspect she has some malabsorption.  She has symptoms consistent with gastroparesis.  She also received vitamin B12 injections in the past and she received 2 injections with her Feraheme infusions.  At the end of 2018 her ferritin had dropped considerably.  Therefore, we proceeded with one dose of Injectafer.  Ferritin and iron drifting down a little bit but she is not iron deficient or anemic at this point.  No intravenous iron at this time but we will closely monitor.    2.  Fatigue: Improved on a thyroid supplement although persists    3.  Gastroparesis    4.  B12 deficiency: B12 level is drifting down a little bit.  She will resume her dissolvable oral supplement as she did recently run out of this.    5.  Chronic back pain     PLAN:  1.  No intravenous iron at this time  2.  Resume an oral dissolvable vitamin B12 supplement  3.  Labs including a CBC, CMP, ferritin, iron profile, reticulocyte count, vitamin B12 level in 2 to 3 months and I will see her back after that to review.  Intravenous iron if appropriate at that time.

## 2020-04-21 ENCOUNTER — PRIOR AUTHORIZATION (OUTPATIENT)
Dept: ENDOCRINOLOGY | Age: 70
End: 2020-04-21

## 2020-04-21 DIAGNOSIS — IMO0002 UNCONTROLLED TYPE 2 DIABETES MELLITUS WITH COMPLICATION, WITH LONG-TERM CURRENT USE OF INSULIN: ICD-10-CM

## 2020-04-29 ENCOUNTER — TELEPHONE (OUTPATIENT)
Dept: OBSTETRICS AND GYNECOLOGY | Age: 70
End: 2020-04-29

## 2020-05-05 ENCOUNTER — TELEPHONE (OUTPATIENT)
Dept: ENDOCRINOLOGY | Age: 70
End: 2020-05-05

## 2020-05-05 NOTE — TELEPHONE ENCOUNTER
Spoke with pt informed needs appt prior to getting diabetic supplies reached out to our [employer] rep Miles to see if he can help pt

## 2020-05-05 NOTE — TELEPHONE ENCOUNTER
----- Message from Marcin Nieto sent at 5/5/2020  8:51 AM EDT -----  Contact: HARJINDER DUNBAR  Pt states that [employer] has stopped sending her supplies because she did not have any labs or office visit with Dr Garcia back in Feb due to Dr Garcia being out office. She states [employer] has to hear from our office in order to get her supplies. Pt can be contacted at [phone].

## 2020-05-06 LAB
ALBUMIN SERPL-MCNC: 4 G/DL (ref 3.5–5.2)
ALBUMIN/CREAT UR: 6 MG/G CREAT (ref 0–29)
ALBUMIN/GLOB SERPL: 1.7 G/DL
ALP SERPL-CCNC: 33 U/L (ref 39–117)
ALT SERPL-CCNC: 22 U/L (ref 1–33)
AST SERPL-CCNC: 20 U/L (ref 1–32)
BILIRUB SERPL-MCNC: 0.6 MG/DL (ref 0.2–1.2)
BUN SERPL-MCNC: 19 MG/DL (ref 8–23)
BUN/CREAT SERPL: 20.9 (ref 7–25)
CALCIUM SERPL-MCNC: 9.5 MG/DL (ref 8.6–10.5)
CHLORIDE SERPL-SCNC: 104 MMOL/L (ref 98–107)
CO2 SERPL-SCNC: 27.6 MMOL/L (ref 22–29)
CREAT SERPL-MCNC: 0.91 MG/DL (ref 0.57–1)
CREAT UR-MCNC: 123.4 MG/DL
GLOBULIN SER CALC-MCNC: 2.4 GM/DL
GLUCOSE SERPL-MCNC: 160 MG/DL (ref 65–99)
HBA1C MFR BLD: 6.72 % (ref 4.8–5.6)
MICROALBUMIN UR-MCNC: 7.6 UG/ML
POTASSIUM SERPL-SCNC: 4.7 MMOL/L (ref 3.5–5.2)
PROT SERPL-MCNC: 6.4 G/DL (ref 6–8.5)
SODIUM SERPL-SCNC: 139 MMOL/L (ref 136–145)
T4 FREE SERPL-MCNC: 1.6 NG/DL (ref 0.93–1.7)
TSH SERPL DL<=0.005 MIU/L-ACNC: 1.01 UIU/ML (ref 0.27–4.2)

## 2020-05-08 ENCOUNTER — APPOINTMENT (OUTPATIENT)
Dept: WOMENS IMAGING | Facility: HOSPITAL | Age: 70
End: 2020-05-08

## 2020-05-08 ENCOUNTER — PROCEDURE VISIT (OUTPATIENT)
Dept: OBSTETRICS AND GYNECOLOGY | Age: 70
End: 2020-05-08

## 2020-05-08 DIAGNOSIS — Z12.31 VISIT FOR SCREENING MAMMOGRAM: Primary | ICD-10-CM

## 2020-05-08 PROCEDURE — 77067 SCR MAMMO BI INCL CAD: CPT | Performed by: OBSTETRICS & GYNECOLOGY

## 2020-05-08 PROCEDURE — 77067 SCR MAMMO BI INCL CAD: CPT | Performed by: RADIOLOGY

## 2020-05-12 ENCOUNTER — TELEMEDICINE (OUTPATIENT)
Dept: ENDOCRINOLOGY | Age: 70
End: 2020-05-12

## 2020-05-12 ENCOUNTER — RESULTS ENCOUNTER (OUTPATIENT)
Dept: ENDOCRINOLOGY | Age: 70
End: 2020-05-12

## 2020-05-12 DIAGNOSIS — IMO0002 UNCONTROLLED TYPE 2 DIABETES MELLITUS WITH BACKGROUND RETINOPATHY: ICD-10-CM

## 2020-05-12 DIAGNOSIS — IMO0002 UNCONTROLLED TYPE 2 DIABETES MELLITUS WITH COMPLICATION, WITH LONG-TERM CURRENT USE OF INSULIN: Primary | ICD-10-CM

## 2020-05-12 DIAGNOSIS — N39.0 URINARY TRACT INFECTION WITHOUT HEMATURIA, SITE UNSPECIFIED: ICD-10-CM

## 2020-05-12 DIAGNOSIS — E03.9 HYPOTHYROIDISM, UNSPECIFIED TYPE: ICD-10-CM

## 2020-05-12 DIAGNOSIS — E16.1 HYPERINSULINISM: ICD-10-CM

## 2020-05-12 DIAGNOSIS — Z79.4 ENCOUNTER FOR LONG-TERM (CURRENT) USE OF INSULIN (HCC): ICD-10-CM

## 2020-05-12 DIAGNOSIS — E11.69 HYPERLIPIDEMIA ASSOCIATED WITH TYPE 2 DIABETES MELLITUS (HCC): ICD-10-CM

## 2020-05-12 DIAGNOSIS — IMO0002 TYPE II DIABETES MELLITUS WITH NEUROLOGICAL MANIFESTATIONS, UNCONTROLLED: ICD-10-CM

## 2020-05-12 DIAGNOSIS — E78.5 HYPERLIPIDEMIA ASSOCIATED WITH TYPE 2 DIABETES MELLITUS (HCC): ICD-10-CM

## 2020-05-12 DIAGNOSIS — IMO0002 UNCONTROLLED TYPE 2 DIABETES MELLITUS WITH COMPLICATION, WITH LONG-TERM CURRENT USE OF INSULIN: ICD-10-CM

## 2020-05-12 PROCEDURE — 99214 OFFICE O/P EST MOD 30 MIN: CPT | Performed by: INTERNAL MEDICINE

## 2020-05-12 RX ORDER — SULFAMETHOXAZOLE AND TRIMETHOPRIM 800; 160 MG/1; MG/1
1 TABLET ORAL 2 TIMES DAILY
Qty: 14 TABLET | Refills: 0 | Status: SHIPPED | OUTPATIENT
Start: 2020-05-12 | End: 2020-06-29

## 2020-05-12 NOTE — PROGRESS NOTES
69 y.o.    Patient Care Team:  Duncan Domingo MD as PCP - General (Internal Medicine)  Axel Lobato MD as PCP - Claims Attributed  Axel Lobato MD as Consulting Physician (Hematology and Oncology)  Chika Ospina MD as Referring Physician (General Surgery)  Frank Allen MD as Consulting Physician (Endocrinology)    Chief Complaint:    Uncontrolled type 2 diabetes mellitus and hypothyroidism  Subjective   Visit type: Video visit  Total time spent: 19 min  Consent:  You have chosen to receive care through a telehealth visit.  Do you consent to use a video/audio connection for your medical care today? Yes  HPI   Patient is a 69-year-old white female with a history of uncontrolled type 2 diabetes mellitus with complications and hypothyroidism came for follow-up    Uncontrolled type 2 diabetes mellitus  Currently taking Tresiba 16 units at night  She takes NovoLog 1 unit for every 12 carbs and 1 unit for every 25/125 correction  She is also using Dex[employer] CGM  She reports that most of the blood sugars are below 150  She is not getting supplies from [employer] recently and is having trouble  Hypoglycemia  Denies any recent episodes  Uncontrolled type 2 diabetes mellitus with neuropathy  Patient report symptoms of diabetic gastroparesis  Uncontrolled type 2 diabetes mellitus diagnosed diabetic retinopathy  Patient has regular follow-up with ophthalmologist  Patient denies any knowledge of nephropathy  Postmenopausal osteoporosis  Patient is currently getting Prolia every 6 months and her gynecologist is following      Interval History      The following portions of the patient's history were reviewed and updated as appropriate: allergies, current medications, past family history, past medical history, past social history, past surgical history and problem list.    Past Medical History:   Diagnosis Date   • Anemia    • Arthritis    • Back pain    • Background diabetic retinopathy of right eye determined by  examination (CMS/[facility])    • Coronary atherosclerosis     cath 1/2014 with 50% mid LAD, otherwise unremarkable   • Diabetes mellitus type 2, insulin dependent (CMS/[facility])    • Gastroparesis due to secondary diabetes (CMS/[facility])    • GERD (gastroesophageal reflux disease)    • History of spinal stenosis    • History of spondylolisthesis    • History of URI (upper respiratory infection)    • Hypercholesteremia    • Hyperlipidemia    • Hypertension    • Iron deficiency anemia    • Left-sided weakness     ARM AND LEG   • Leukocytosis     MILD   • Osteopenia    • Osteoporosis    • Peripheral neuropathy    • Retinopathy    • Vertigo      Family History   Problem Relation Age of Onset   • Polycythemia Mother         Progressed to secondary myelofibrosis   • Cirrhosis Father    • Diabetes Other    • Hypertension Other    • Cancer Neg Hx      Social History     Socioeconomic History   • Marital status:      Spouse name: Yehuda   • Number of children: Not on file   • Years of education: Not on file   • Highest education level: Not on file   Occupational History   • Occupation: 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Employer: [employer]     Employer: RETIRED   Tobacco Use   • Smoking status: Never Smoker   • Smokeless tobacco: Never Used   Substance and Sexual Activity   • Alcohol use: Yes     Comment: OCCASIONAL   • Drug use: No   • Sexual activity: Defer     Allergies   Allergen Reactions   • Topamax [Topiramate] Unknown - Low Severity       Current Outpatient Medications:   •  ALPRAZolam (XANAX) 0.25 MG tablet, 0.25 mg 3 (Three) Times a Day As Needed., Disp: , Rfl:   •  amLODIPine (NORVASC) 2.5 MG tablet, Take 2.5 mg by mouth Daily., Disp: , Rfl:   •  aspirin 81 MG tablet, , Disp: , Rfl:   •  atenolol (TENORMIN) 50 MG tablet, Take 1 tablet by mouth Daily., Disp: 30 tablet, Rfl: 6  •  atorvastatin (LIPITOR) 80 MG tablet, , Disp: , Rfl:   •  denosumab (PROLIA) 60 MG/ML solution syringe, Inject 60 mg under the skin  Every 6 (Six) Months., Disp: , Rfl:   •  fenofibrate (TRICOR) 145 MG tablet, Take 145 mg by mouth Daily., Disp: , Rfl:   •  insulin aspart (NovoLOG FlexPen) 100 UNIT/ML solution pen-injector sc pen, 15 UNITS SQ TID WITH MEALS, Disp: 15 pen, Rfl: 1  •  Insulin Pen Needle (BD PEN NEEDLE SOCO U/F) 32G X 4 MM misc, 4 times daily, Disp: 200 each, Rfl: 3  •  meloxicam (MOBIC) 15 MG tablet, Take 15 mg by mouth daily., Disp: , Rfl:   •  nitroglycerin (NITROSTAT) 0.4 MG SL tablet, Place 0.4 mg under the tongue Every 5 (Five) Minutes As Needed., Disp: , Rfl:   •  ONETOUCH DELICA LANCETS 33G misc, , Disp: , Rfl:   •  ONETOUCH VERIO test strip, TO TEST BLOOD SUGARS 6 TIMES DAILY E11.8, Disp: 550 each, Rfl: 3  •  sulfamethoxazole-trimethoprim (Bactrim DS) 800-160 MG per tablet, Take 1 tablet by mouth 2 (Two) Times a Day., Disp: 14 tablet, Rfl: 0  •  SYNTHROID 50 MCG tablet, Take 1 tablet by mouth Daily., Disp: 30 tablet, Rfl: 6  •  TRESIBA FLEXTOUCH 200 UNIT/ML solution pen-injector pen injection, INJECT 30 UNITS UNDER THE SKIN DAILY, Disp: 9 mL, Rfl: 1  •  triamterene-hydrochlorothiazide (MAXZIDE-25) 37.5-25 MG per tablet, TAKES ONE HALF TABLET DAILY, Disp: , Rfl:   •  zolpidem (AMBIEN) 10 MG tablet, Take 10 mg by mouth Every Night., Disp: , Rfl:         Review of Systems   Constitutional: Negative.    Respiratory: Negative.    Cardiovascular: Negative.    Gastrointestinal: Negative.    Genitourinary: Positive for dysuria and frequency.   All other systems reviewed and are negative.      Objective       There were no vitals filed for this visit.  There is no height or weight on file to calculate BMI.    Vital signs and physical examination not documented due to video visit  Physical Exam  Results Review:     I reviewed the patient's new clinical results.    Medical records reviewed  Summary:      Lab on 04/13/2020   Component Date Value Ref Range Status   • Glucose 04/13/2020 158* 74 - 124 mg/dL Final   • BUN 04/13/2020 23* 6 -  20 mg/dL Final   • Creatinine 04/13/2020 0.93  0.60 - 1.10 mg/dL Final   • Sodium 04/13/2020 139  134 - 145 mmol/L Final   • Potassium 04/13/2020 4.4  3.5 - 4.7 mmol/L Final   • Chloride 04/13/2020 103  98 - 107 mmol/L Final   • CO2 04/13/2020 25.0  22.0 - 29.0 mmol/L Final   • Calcium 04/13/2020 9.3  8.5 - 10.2 mg/dL Final   • Total Protein 04/13/2020 6.4  6.3 - 8.0 g/dL Final   • Albumin 04/13/2020 3.90  3.50 - 5.20 g/dL Final   • ALT (SGPT) 04/13/2020 20  0 - 33 U/L Final   • AST (SGOT) 04/13/2020 18  0 - 32 U/L Final   • Alkaline Phosphatase 04/13/2020 31* 38 - 116 U/L Final   • Total Bilirubin 04/13/2020 0.4  0.2 - 1.2 mg/dL Final   • eGFR Non African Amer 04/13/2020 59* >60 mL/min/1.73 Final   • Globulin 04/13/2020 2.5  1.8 - 3.5 gm/dL Final   • A/G Ratio 04/13/2020 1.6  1.1 - 2.4 g/dL Final   • BUN/Creatinine Ratio 04/13/2020 24.7  7.3 - 30.0 Final   • Anion Gap 04/13/2020 11.0  5.0 - 15.0 mmol/L Final   • Immature Reticulocyte Fraction 04/13/2020 14.1  3.0 - 15.8 % Final   • Reticulocyte % 04/13/2020 1.69  0.70 - 1.90 % Final   • Reticulocyte Hgb 04/13/2020 32.3  29.8 - 36.1 pg Final   • Iron 04/13/2020 50  37 - 145 mcg/dL Final   • Iron Saturation 04/13/2020 11* 14 - 48 % Final   • Transferrin 04/13/2020 332  200 - 360 mg/dL Final   • TIBC 04/13/2020 465  249 - 505 mcg/dL Final   • Ferritin 04/13/2020 64.00  13.00 - 150.00 ng/mL Final   • Vitamin B-12 04/13/2020 270  211 - 946 pg/mL Final   • WBC 04/13/2020 5.91  3.40 - 10.80 10*3/mm3 Final   • RBC 04/13/2020 4.45  3.77 - 5.28 10*6/mm3 Final   • Hemoglobin 04/13/2020 12.2  12.0 - 15.9 g/dL Final   • Hematocrit 04/13/2020 38.1  34.0 - 46.6 % Final   • MCV 04/13/2020 85.6  79.0 - 97.0 fL Final   • MCH 04/13/2020 27.4  26.6 - 33.0 pg Final   • MCHC 04/13/2020 32.0  31.5 - 35.7 g/dL Final   • RDW 04/13/2020 14.1  12.3 - 15.4 % Final   • RDW-SD 04/13/2020 43.8  37.0 - 54.0 fl Final   • MPV 04/13/2020 10.8  6.0 - 12.0 fL Final   • Platelets 04/13/2020 206  140 -  450 10*3/mm3 Final   • Neutrophil % 04/13/2020 60.5  42.7 - 76.0 % Final   • Lymphocyte % 04/13/2020 21.5  19.6 - 45.3 % Final   • Monocyte % 04/13/2020 7.8  5.0 - 12.0 % Final   • Eosinophil % 04/13/2020 9.0* 0.3 - 6.2 % Final   • Basophil % 04/13/2020 0.7  0.0 - 1.5 % Final   • Immature Grans % 04/13/2020 0.5  0.0 - 0.5 % Final   • Neutrophils, Absolute 04/13/2020 3.58  1.70 - 7.00 10*3/mm3 Final   • Lymphocytes, Absolute 04/13/2020 1.27  0.70 - 3.10 10*3/mm3 Final   • Monocytes, Absolute 04/13/2020 0.46  0.10 - 0.90 10*3/mm3 Final   • Eosinophils, Absolute 04/13/2020 0.53* 0.00 - 0.40 10*3/mm3 Final   • Basophils, Absolute 04/13/2020 0.04  0.00 - 0.20 10*3/mm3 Final   • Immature Grans, Absolute 04/13/2020 0.03  0.00 - 0.05 10*3/mm3 Final   • nRBC 04/13/2020 0.0  0.0 - 0.2 /100 WBC Final     Lab Results   Component Value Date    HGBA1C 6.72 (H) 05/05/2020    HGBA1C 6.40 (H) 10/25/2019    HGBA1C 6.00 (H) 07/11/2019     Lab Results   Component Value Date    MICROALBUR 7.6 05/05/2020    CREATININE 0.91 05/05/2020     Imaging Results (Most Recent)     None                Assessment and Plan:    Diagnoses and all orders for this visit:    Uncontrolled type 2 diabetes mellitus with complication, with long-term current use of insulin (CMS/[facility])  -     Comprehensive Metabolic Panel; Future  -     Hemoglobin A1c; Future  -     Microalbumin / Creatinine Urine Ratio - Urine, Clean Catch; Future  -     TSH; Future  -     T4, Free; Future    Uncontrolled type 2 diabetes mellitus with background retinopathy (CMS/[facility])  -     Comprehensive Metabolic Panel; Future  -     Hemoglobin A1c; Future  -     Microalbumin / Creatinine Urine Ratio - Urine, Clean Catch; Future  -     TSH; Future  -     T4, Free; Future    Type II diabetes mellitus with neurological manifestations, uncontrolled (CMS/[facility])    Hypothyroidism, unspecified type  -     Comprehensive Metabolic Panel; Future  -     Hemoglobin A1c; Future  -     Microalbumin /  "Creatinine Urine Ratio - Urine, Clean Catch; Future  -     TSH; Future  -     T4, Free; Future    Hyperinsulinism    Encounter for long-term (current) use of insulin (CMS/[facility])    Hyperlipidemia associated with type 2 diabetes mellitus (CMS/[facility])    Urinary tract infection without hematuria, site unspecified    Other orders  -     sulfamethoxazole-trimethoprim (Bactrim DS) 800-160 MG per tablet; Take 1 tablet by mouth 2 (Two) Times a Day.    Patient is currently using [employer]  She reported that her blood sugars are reasonably stable  She is running out of sensors and having trouble getting them shipped  I thought Medicare was giving 6 months break for Medicare patients to be seen  I will reach out to Miles and ask him    Hemoglobin A1c 6.7%  TSH stable    Patient is reporting symptoms of UTI for the past few days and would like a prescription  She has used Bactrim in the past  I will prescribe Bactrim 1 twice daily for 7 days  Prescription sent to pharmacy  Patient return to follow-up in 3 months with lab tests      Frank Allen MD. FACE    05/12/20      EMR Dragon / transcription disclaimer:     \"Dictated utilizing Dragon dictation\".         "

## 2020-05-17 ENCOUNTER — RESULTS ENCOUNTER (OUTPATIENT)
Dept: ENDOCRINOLOGY | Age: 70
End: 2020-05-17

## 2020-05-17 DIAGNOSIS — IMO0002 UNCONTROLLED TYPE 2 DIABETES MELLITUS WITH COMPLICATION, WITH LONG-TERM CURRENT USE OF INSULIN: ICD-10-CM

## 2020-05-17 DIAGNOSIS — IMO0002 UNCONTROLLED TYPE 2 DIABETES MELLITUS WITH BACKGROUND RETINOPATHY: ICD-10-CM

## 2020-05-17 DIAGNOSIS — E03.9 HYPOTHYROIDISM, UNSPECIFIED TYPE: ICD-10-CM

## 2020-06-04 ENCOUNTER — INFUSION (OUTPATIENT)
Dept: ONCOLOGY | Facility: HOSPITAL | Age: 70
End: 2020-06-04

## 2020-06-04 VITALS
TEMPERATURE: 99 F | RESPIRATION RATE: 18 BRPM | OXYGEN SATURATION: 99 % | BODY MASS INDEX: 25.29 KG/M2 | SYSTOLIC BLOOD PRESSURE: 163 MMHG | HEART RATE: 73 BPM | DIASTOLIC BLOOD PRESSURE: 83 MMHG | HEIGHT: 60 IN | WEIGHT: 128.8 LBS

## 2020-06-04 DIAGNOSIS — M81.0 OSTEOPOROSIS, POST-MENOPAUSAL: Primary | ICD-10-CM

## 2020-06-04 DIAGNOSIS — M81.0 OSTEOPOROSIS, UNSPECIFIED OSTEOPOROSIS TYPE, UNSPECIFIED PATHOLOGICAL FRACTURE PRESENCE: ICD-10-CM

## 2020-06-04 PROCEDURE — 96372 THER/PROPH/DIAG INJ SC/IM: CPT

## 2020-06-04 PROCEDURE — 25010000002 DENOSUMAB 60 MG/ML SOLUTION PREFILLED SYRINGE: Performed by: INTERNAL MEDICINE

## 2020-06-04 RX ADMIN — DENOSUMAB 60 MG: 60 INJECTION SUBCUTANEOUS at 13:50

## 2020-06-04 NOTE — NURSING NOTE
Arrived  for prolia injection. Indication and side effects reviewed. Denies recent dental work. Labs and medications verified. Prolia administered in left arm without incidence.Patient will try Tums to replete calcium since calcium usually upsets her stomach Instructed to call prescribing MD for any concerns or questions and instructed on how to schedule future appts.  Pt vu and discharged ambulatory.

## 2020-06-11 ENCOUNTER — OFFICE VISIT (OUTPATIENT)
Dept: OBSTETRICS AND GYNECOLOGY | Age: 70
End: 2020-06-11

## 2020-06-11 VITALS
HEIGHT: 60 IN | WEIGHT: 127.6 LBS | SYSTOLIC BLOOD PRESSURE: 146 MMHG | DIASTOLIC BLOOD PRESSURE: 84 MMHG | BODY MASS INDEX: 25.05 KG/M2

## 2020-06-11 DIAGNOSIS — M81.0 AGE RELATED OSTEOPOROSIS, UNSPECIFIED PATHOLOGICAL FRACTURE PRESENCE: ICD-10-CM

## 2020-06-11 DIAGNOSIS — Z01.419 WELL WOMAN EXAM WITH ROUTINE GYNECOLOGICAL EXAM: Primary | ICD-10-CM

## 2020-06-11 PROCEDURE — G0101 CA SCREEN;PELVIC/BREAST EXAM: HCPCS | Performed by: OBSTETRICS & GYNECOLOGY

## 2020-06-11 NOTE — PROGRESS NOTES
"Chief complaint: annual/ new gyn    Subjective   History of Present Illness    Shirin Washington is a 70 y.o.  who presents for annual exam/ new gyn. Prior patient of Dr Baeza with [facility], but they do not take Medicare.  Her menses are absent.  Prior hysterectomy (TVH with ovarian conservation) no estrogen tx. Not sexually active now.  On Prolia for osteoporosis. Last DEXA .   MMG 2020 normal  Obstetric History:  OB History        2    Para   2    Term   2            AB        Living   2       SAB        TAB        Ectopic        Molar        Multiple        Live Births   2               Menstrual History:     No LMP recorded. Patient has had a hysterectomy.         Current contraception: status post hysterectomy  History of abnormal Pap smear: no  Received Gardasil immunization: no  Perform regular self breast exam: yes -    Family history of uterine or ovarian cancer: no  Family History of colon cancer: no  Family history of breast cancer: no    Mammogram: up to date.  Colonoscopy: up to date.  DEXA: ordered.    Exercise: moderately active  Calcium/Vitamin D: adequate intake    The following portions of the patient's history were reviewed and updated as appropriate: allergies, current medications, past family history, past medical history, past social history, past surgical history and problem list.    Review of Systems    Pertinent items are noted in HPI.     Objective   Physical Exam    /84   Ht 152.4 cm (60\")   Wt 57.9 kg (127 lb 9.6 oz)   Breastfeeding No   BMI 24.92 kg/m²     General:   alert, appears stated age and cooperative   Neck:    Heart:    Lungs:    Abdomen: soft, nondistended, nontender and no masses palpated   Breast: inspection negative, no nipple discharge or bleeding, no masses or nodularity palpable   Vulva: Bartholin's, Urethra, [place]'s normal   Vagina: normal mucosa, atrophic   Cervix: absent   Uterus: absent   Adnexa: normal adnexa and no mass, " fullness, tenderness   Rectal: not indicated     Assessment/Plan   Shirin was seen today for gynecologic exam and establish care.    Diagnoses and all orders for this visit:    Well woman exam with routine gynecological exam    Age related osteoporosis, unspecified pathological fracture presence    currently on prolia, plan DEXA next year, encouraged weight bearing exercises, Ca and vit D    Breast self exam technique reviewed and patient encouraged to perform self-exam monthly.  Discussed healthy lifestyle modifications.  Pap smear not needed

## 2020-06-22 ENCOUNTER — LAB (OUTPATIENT)
Dept: LAB | Facility: HOSPITAL | Age: 70
End: 2020-06-22

## 2020-06-22 DIAGNOSIS — D50.9 IRON DEFICIENCY ANEMIA, UNSPECIFIED IRON DEFICIENCY ANEMIA TYPE: ICD-10-CM

## 2020-06-22 DIAGNOSIS — E53.8 B12 DEFICIENCY: ICD-10-CM

## 2020-06-22 LAB
ALBUMIN SERPL-MCNC: 4 G/DL (ref 3.5–5.2)
ALBUMIN/GLOB SERPL: 1.6 G/DL (ref 1.1–2.4)
ALP SERPL-CCNC: 37 U/L (ref 38–116)
ALT SERPL W P-5'-P-CCNC: 19 U/L (ref 0–33)
ANION GAP SERPL CALCULATED.3IONS-SCNC: 11.6 MMOL/L (ref 5–15)
AST SERPL-CCNC: 19 U/L (ref 0–32)
BASOPHILS # BLD AUTO: 0.07 10*3/MM3 (ref 0–0.2)
BASOPHILS NFR BLD AUTO: 0.9 % (ref 0–1.5)
BILIRUB SERPL-MCNC: 0.5 MG/DL (ref 0.2–1.2)
BUN BLD-MCNC: 26 MG/DL (ref 6–20)
BUN/CREAT SERPL: 30.2 (ref 7.3–30)
CALCIUM SPEC-SCNC: 9.6 MG/DL (ref 8.5–10.2)
CHLORIDE SERPL-SCNC: 103 MMOL/L (ref 98–107)
CO2 SERPL-SCNC: 23.4 MMOL/L (ref 22–29)
CREAT BLD-MCNC: 0.86 MG/DL (ref 0.6–1.1)
DEPRECATED RDW RBC AUTO: 46.2 FL (ref 37–54)
EOSINOPHIL # BLD AUTO: 0.58 10*3/MM3 (ref 0–0.4)
EOSINOPHIL NFR BLD AUTO: 7.2 % (ref 0.3–6.2)
ERYTHROCYTE [DISTWIDTH] IN BLOOD BY AUTOMATED COUNT: 14.5 % (ref 12.3–15.4)
FERRITIN SERPL-MCNC: 35.6 NG/ML (ref 13–150)
GFR SERPL CREATININE-BSD FRML MDRD: 65 ML/MIN/1.73
GLOBULIN UR ELPH-MCNC: 2.5 GM/DL (ref 1.8–3.5)
GLUCOSE BLD-MCNC: 192 MG/DL (ref 74–124)
HCT VFR BLD AUTO: 38.9 % (ref 34–46.6)
HGB BLD-MCNC: 12.4 G/DL (ref 12–15.9)
HGB RETIC QN AUTO: 32.1 PG (ref 29.8–36.1)
IMM GRANULOCYTES # BLD AUTO: 0.06 10*3/MM3 (ref 0–0.05)
IMM GRANULOCYTES NFR BLD AUTO: 0.7 % (ref 0–0.5)
IMM RETICS NFR: 15.9 % (ref 3–15.8)
IRON 24H UR-MRATE: 64 MCG/DL (ref 37–145)
IRON SATN MFR SERPL: 14 % (ref 14–48)
LYMPHOCYTES # BLD AUTO: 1.63 10*3/MM3 (ref 0.7–3.1)
LYMPHOCYTES NFR BLD AUTO: 20.1 % (ref 19.6–45.3)
MCH RBC QN AUTO: 27.7 PG (ref 26.6–33)
MCHC RBC AUTO-ENTMCNC: 31.9 G/DL (ref 31.5–35.7)
MCV RBC AUTO: 87 FL (ref 79–97)
MONOCYTES # BLD AUTO: 0.57 10*3/MM3 (ref 0.1–0.9)
MONOCYTES NFR BLD AUTO: 7 % (ref 5–12)
NEUTROPHILS # BLD AUTO: 5.2 10*3/MM3 (ref 1.7–7)
NEUTROPHILS NFR BLD AUTO: 64.1 % (ref 42.7–76)
NRBC BLD AUTO-RTO: 0 /100 WBC (ref 0–0.2)
PLATELET # BLD AUTO: 234 10*3/MM3 (ref 140–450)
PMV BLD AUTO: 10.8 FL (ref 6–12)
POTASSIUM BLD-SCNC: 4.5 MMOL/L (ref 3.5–4.7)
PROT SERPL-MCNC: 6.5 G/DL (ref 6.3–8)
RBC # BLD AUTO: 4.47 10*6/MM3 (ref 3.77–5.28)
RETICS/RBC NFR AUTO: 2.12 % (ref 0.7–1.9)
SODIUM BLD-SCNC: 138 MMOL/L (ref 134–145)
TIBC SERPL-MCNC: 473 MCG/DL (ref 249–505)
TRANSFERRIN SERPL-MCNC: 338 MG/DL (ref 200–360)
VIT B12 BLD-MCNC: 362 PG/ML (ref 211–946)
WBC NRBC COR # BLD: 8.11 10*3/MM3 (ref 3.4–10.8)

## 2020-06-22 PROCEDURE — 80053 COMPREHEN METABOLIC PANEL: CPT

## 2020-06-22 PROCEDURE — 85046 RETICYTE/HGB CONCENTRATE: CPT

## 2020-06-22 PROCEDURE — 82728 ASSAY OF FERRITIN: CPT

## 2020-06-22 PROCEDURE — 82607 VITAMIN B-12: CPT | Performed by: INTERNAL MEDICINE

## 2020-06-22 PROCEDURE — 36415 COLL VENOUS BLD VENIPUNCTURE: CPT

## 2020-06-22 PROCEDURE — 83540 ASSAY OF IRON: CPT

## 2020-06-22 PROCEDURE — 84466 ASSAY OF TRANSFERRIN: CPT

## 2020-06-22 PROCEDURE — 85025 COMPLETE CBC W/AUTO DIFF WBC: CPT

## 2020-06-28 NOTE — PROGRESS NOTES
[facility] GROUP OUTPATIENT FOLLOW UP CLINIC VISIT    REASON FOR FOLLOW-UP:    1.  Iron deficiency anemia.  Her last intravenous Feraheme infusions or in October 2015.  Injectafer administered on 1/4/2019.    HISTORY OF PRESENT ILLNESS:  Shirin Washington is a 70 y.o. female who returns today for follow up of the above issue.    She continues to have some back discomfort.  She is playing golf about 2 times weekly.  Intermittent vertigo persists but she knows what to avoid to prevent exacerbations.  No bleeding.  Energy level is reasonable though she does have intermittent fatigue.  She does not feel like she needs intravenous iron at this point.  She continues an oral vitamin B12 supplement.    PAST MEDICAL, SURGICAL, FAMILY, AND SOCIAL HISTORIES were reviewed with the patient and in the electronic medical record, and were updated if indicated.    ALLERGIES:  Allergies   Allergen Reactions   • Topamax [Topiramate] Unknown - Low Severity       MEDICATIONS:  The medication list has been reviewed with the patient by the medical assistant, and the list has been updated in the electronic medical record, which I reviewed.  Medication dosages and frequencies were confirmed to be accurate.    REVIEW OF SYSTEMS:  PAIN:  See Vital Signs below.  GENERAL:  No fevers, chills, night sweats, or unintended weight loss.  Fatigue.    SKIN:  No rashes or non-healing lesions.  Mild alopecia  HEME/LYMPH:  No abnormal bleeding.  No palpable lymphadenopathy.  EYES:  No vision changes or diplopia.  ENT:  Vertigo which has improved  RESPIRATORY:  No cough, shortness of breath, hemoptysis, or wheezing.  CARDIOVASCULAR:  No chest pain or shortness of breath  GASTROINTESTINAL:  Abdominal bloating .   GENITOURINARY:  No dysuria or hematuria.  MUSCULOSKELETAL: Chronic back pain which has improved overall  NEUROLOGIC:  No dizziness, loss of consciousness, or seizures.  PSYCHIATRIC:  No depression, anxiety, or mood changes.    Vitals:     "06/29/20 0923   BP: 150/68   Pulse: 84   Resp: 16   Temp: 98 °F (36.7 °C)   TempSrc: Oral   SpO2: 97%   Weight: 58.4 kg (128 lb 12.8 oz)   Height: 152.4 cm (60\")   PainSc: 0-No pain  Comment: anemia       PHYSICAL EXAMINATION:  GENERAL:  Well-developed well-nourished female; awake, alert and oriented, in no acute distress.  Lymphatics: No cervical supraclavicular or axillary adenopathy  Chest: Lungs clear to auscultation bilaterally  Heart: No murmurs gallops or rubs.  Regular rate and rhythm.  Extremities: Warm and well perfused with no clubbing cyanosis or edema      DIAGNOSTIC DATA:  Results for orders placed or performed in visit on 06/22/20   Comprehensive Metabolic Panel   Result Value Ref Range    Glucose 192 (H) 74 - 124 mg/dL    BUN 26 (H) 6 - 20 mg/dL    Creatinine 0.86 0.60 - 1.10 mg/dL    Sodium 138 134 - 145 mmol/L    Potassium 4.5 3.5 - 4.7 mmol/L    Chloride 103 98 - 107 mmol/L    CO2 23.4 22.0 - 29.0 mmol/L    Calcium 9.6 8.5 - 10.2 mg/dL    Total Protein 6.5 6.3 - 8.0 g/dL    Albumin 4.00 3.50 - 5.20 g/dL    ALT (SGPT) 19 0 - 33 U/L    AST (SGOT) 19 0 - 32 U/L    Alkaline Phosphatase 37 (L) 38 - 116 U/L    Total Bilirubin 0.5 0.2 - 1.2 mg/dL    eGFR Non African Amer 65 >60 mL/min/1.73    Globulin 2.5 1.8 - 3.5 gm/dL    A/G Ratio 1.6 1.1 - 2.4 g/dL    BUN/Creatinine Ratio 30.2 (H) 7.3 - 30.0    Anion Gap 11.6 5.0 - 15.0 mmol/L   Ferritin   Result Value Ref Range    Ferritin 35.60 13.00 - 150.00 ng/mL   Iron Profile   Result Value Ref Range    Iron 64 37 - 145 mcg/dL    Iron Saturation 14 14 - 48 %    Transferrin 338 200 - 360 mg/dL    TIBC 473 249 - 505 mcg/dL   Retic With IRF & RET-He   Result Value Ref Range    Immature Reticulocyte Fraction 15.9 (H) 3.0 - 15.8 %    Reticulocyte % 2.12 (H) 0.70 - 1.90 %    Reticulocyte Hgb 32.1 29.8 - 36.1 pg   Vitamin B12   Result Value Ref Range    Vitamin B-12 362 211 - 946 pg/mL   CBC Auto Differential   Result Value Ref Range    WBC 8.11 3.40 - 10.80 10*3/mm3 "    RBC 4.47 3.77 - 5.28 10*6/mm3    Hemoglobin 12.4 12.0 - 15.9 g/dL    Hematocrit 38.9 34.0 - 46.6 %    MCV 87.0 79.0 - 97.0 fL    MCH 27.7 26.6 - 33.0 pg    MCHC 31.9 31.5 - 35.7 g/dL    RDW 14.5 12.3 - 15.4 %    RDW-SD 46.2 37.0 - 54.0 fl    MPV 10.8 6.0 - 12.0 fL    Platelets 234 140 - 450 10*3/mm3    Neutrophil % 64.1 42.7 - 76.0 %    Lymphocyte % 20.1 19.6 - 45.3 %    Monocyte % 7.0 5.0 - 12.0 %    Eosinophil % 7.2 (H) 0.3 - 6.2 %    Basophil % 0.9 0.0 - 1.5 %    Immature Grans % 0.7 (H) 0.0 - 0.5 %    Neutrophils, Absolute 5.20 1.70 - 7.00 10*3/mm3    Lymphocytes, Absolute 1.63 0.70 - 3.10 10*3/mm3    Monocytes, Absolute 0.57 0.10 - 0.90 10*3/mm3    Eosinophils, Absolute 0.58 (H) 0.00 - 0.40 10*3/mm3    Basophils, Absolute 0.07 0.00 - 0.20 10*3/mm3    Immature Grans, Absolute 0.06 (H) 0.00 - 0.05 10*3/mm3    nRBC 0.0 0.0 - 0.2 /100 WBC         ASSESSMENT:  This is a 70 y.o. female with:  1.  History of iron deficiency anemia: She required intravenous Feraheme last in October 2015 and we gave 2 more doses in September and October 2017.  No obvious GI blood loss.  I suspect she has some malabsorption.  She has symptoms consistent with gastroparesis.  She also received vitamin B12 injections in the past and she received 2 injections with her Feraheme infusions.  At the end of 2018 her ferritin had dropped considerably.  Therefore, we proceeded with one dose of Injectafer.  Ferritin is down to 35.6 with an iron of 64.  Hemoglobin remains normal at 12.4.    2.  Fatigue: Improved on a thyroid supplement although persists    3.  Gastroparesis    4.  B12 deficiency: B12 level is drifting down a little bit.  B12 level 362 on 6/23/2020.  She continues an oral supplement.    5.  Chronic back pain     PLAN:  No need for intravenous iron at this point.  Continue her oral vitamin B12 supplement.  We will see her back in 3 months with labs done about a week prior.  If she feels like she might be becoming more anemic or iron  deficient before that we can always see her sooner.

## 2020-06-29 ENCOUNTER — OFFICE VISIT (OUTPATIENT)
Dept: ONCOLOGY | Facility: CLINIC | Age: 70
End: 2020-06-29

## 2020-06-29 ENCOUNTER — APPOINTMENT (OUTPATIENT)
Dept: LAB | Facility: HOSPITAL | Age: 70
End: 2020-06-29

## 2020-06-29 VITALS
HEIGHT: 60 IN | WEIGHT: 128.8 LBS | HEART RATE: 84 BPM | BODY MASS INDEX: 25.29 KG/M2 | SYSTOLIC BLOOD PRESSURE: 150 MMHG | TEMPERATURE: 98 F | OXYGEN SATURATION: 97 % | DIASTOLIC BLOOD PRESSURE: 68 MMHG | RESPIRATION RATE: 16 BRPM

## 2020-06-29 DIAGNOSIS — D50.9 IRON DEFICIENCY ANEMIA, UNSPECIFIED IRON DEFICIENCY ANEMIA TYPE: ICD-10-CM

## 2020-06-29 DIAGNOSIS — E53.8 B12 DEFICIENCY: Primary | ICD-10-CM

## 2020-06-29 PROCEDURE — 99213 OFFICE O/P EST LOW 20 MIN: CPT | Performed by: INTERNAL MEDICINE

## 2020-06-30 ENCOUNTER — RESULTS ENCOUNTER (OUTPATIENT)
Dept: ENDOCRINOLOGY | Age: 70
End: 2020-06-30

## 2020-06-30 DIAGNOSIS — IMO0002 UNCONTROLLED TYPE 2 DIABETES MELLITUS WITH COMPLICATION, WITH LONG-TERM CURRENT USE OF INSULIN: ICD-10-CM

## 2020-06-30 DIAGNOSIS — IMO0002 UNCONTROLLED TYPE 2 DIABETES MELLITUS WITH BACKGROUND RETINOPATHY: ICD-10-CM

## 2020-06-30 DIAGNOSIS — E03.9 HYPOTHYROIDISM, UNSPECIFIED TYPE: ICD-10-CM

## 2020-07-09 RX ORDER — INSULIN DEGLUDEC 200 U/ML
30 INJECTION, SOLUTION SUBCUTANEOUS DAILY
Qty: 30 ML | Refills: 1 | Status: SHIPPED | OUTPATIENT
Start: 2020-07-09 | End: 2021-02-18 | Stop reason: SDUPTHER

## 2020-08-17 ENCOUNTER — OFFICE VISIT (OUTPATIENT)
Dept: ENDOCRINOLOGY | Age: 70
End: 2020-08-17

## 2020-08-17 VITALS
OXYGEN SATURATION: 96 % | RESPIRATION RATE: 20 BRPM | HEIGHT: 60 IN | DIASTOLIC BLOOD PRESSURE: 74 MMHG | WEIGHT: 127.6 LBS | SYSTOLIC BLOOD PRESSURE: 132 MMHG | BODY MASS INDEX: 25.05 KG/M2 | HEART RATE: 71 BPM

## 2020-08-17 DIAGNOSIS — E03.9 HYPOTHYROIDISM, UNSPECIFIED TYPE: ICD-10-CM

## 2020-08-17 DIAGNOSIS — Z79.4 CONTROLLED TYPE 2 DIABETES MELLITUS WITH COMPLICATION, WITH LONG-TERM CURRENT USE OF INSULIN (HCC): Primary | ICD-10-CM

## 2020-08-17 DIAGNOSIS — I10 ESSENTIAL HYPERTENSION: ICD-10-CM

## 2020-08-17 DIAGNOSIS — E11.8 CONTROLLED TYPE 2 DIABETES MELLITUS WITH COMPLICATION, WITH LONG-TERM CURRENT USE OF INSULIN (HCC): Primary | ICD-10-CM

## 2020-08-17 DIAGNOSIS — IMO0002 UNCONTROLLED TYPE 2 DIABETES MELLITUS WITH COMPLICATION, WITH LONG-TERM CURRENT USE OF INSULIN: ICD-10-CM

## 2020-08-17 DIAGNOSIS — E78.5 HYPERLIPIDEMIA, UNSPECIFIED HYPERLIPIDEMIA TYPE: ICD-10-CM

## 2020-08-17 PROCEDURE — 95251 CONT GLUC MNTR ANALYSIS I&R: CPT | Performed by: NURSE PRACTITIONER

## 2020-08-17 PROCEDURE — 99214 OFFICE O/P EST MOD 30 MIN: CPT | Performed by: NURSE PRACTITIONER

## 2020-08-17 NOTE — PROGRESS NOTES
"Subjective   Shirin Washington is a 70 y.o. female is here today for follow-up.  Chief Complaint   Patient presents with   • Diabetes     FUP TYPE 2 DIABETES UNCONTROLLED RECENT LABS  RECENT EYE EXAM 05/2020 DR LAYLA JAVIER CHECKS BS 6-7 TIMES DAILY    • Hypothyroidism     /74   Pulse 71   Resp 20   Ht 152.4 cm (60\")   Wt 57.9 kg (127 lb 9.6 oz)   SpO2 96%   BMI 24.92 kg/m²   Current Outpatient Medications on File Prior to Visit   Medication Sig   • ALPRAZolam (XANAX) 0.25 MG tablet 0.25 mg 3 (Three) Times a Day As Needed.   • amLODIPine (NORVASC) 2.5 MG tablet Take 5 mg by mouth Daily.   • aspirin 81 MG tablet    • atenolol (TENORMIN) 50 MG tablet Take 1 tablet by mouth Daily.   • atorvastatin (LIPITOR) 80 MG tablet Take 80 mg by mouth Every Night.   • denosumab (PROLIA) 60 MG/ML solution syringe Inject 60 mg under the skin Every 6 (Six) Months.   • fenofibrate (TRICOR) 145 MG tablet Take 145 mg by mouth Daily.   • Insulin Pen Needle (BD PEN NEEDLE SOCO U/F) 32G X 4 MM misc 4 times daily   • meloxicam (MOBIC) 15 MG tablet Take 15 mg by mouth daily.   • nitroglycerin (NITROSTAT) 0.4 MG SL tablet Place 0.4 mg under the tongue Every 5 (Five) Minutes As Needed.   • ONETOUCH DELICA LANCETS 33G misc    • SYNTHROID 50 MCG tablet Take 1 tablet by mouth Daily.   • TRESIBA FLEXTOUCH 200 UNIT/ML solution pen-injector pen injection INJECT 30 UNITS UNDER THE SKIN DAILY (Patient taking differently: Inject 30 Units under the skin into the appropriate area as directed Daily. 20 UNITS QHS)   • zolpidem (AMBIEN) 10 MG tablet Take 10 mg by mouth Every Night.   • [DISCONTINUED] insulin aspart (NovoLOG FlexPen) 100 UNIT/ML solution pen-injector sc pen 15 UNITS SQ TID WITH MEALS     No current facility-administered medications on file prior to visit.      Family History   Problem Relation Age of Onset   • Polycythemia Mother         Progressed to secondary myelofibrosis   • Cirrhosis Father    • Diabetes Other    • Hypertension " Other    • Cancer Neg Hx    • Breast cancer Neg Hx    • Ovarian cancer Neg Hx    • Uterine cancer Neg Hx    • Colon cancer Neg Hx    • Deep vein thrombosis Neg Hx    • Pulmonary embolism Neg Hx      Social History     Tobacco Use   • Smoking status: Never Smoker   • Smokeless tobacco: Never Used   Substance Use Topics   • Alcohol use: Yes     Comment: OCCASIONAL   • Drug use: No     Allergies   Allergen Reactions   • Topamax [Topiramate] Unknown - Low Severity         History of Present Illness   Encounter Diagnoses   Name Primary?   • Hyperlipidemia, unspecified hyperlipidemia type    • Essential hypertension    • Hypothyroidism, unspecified type    • Uncontrolled type 2 diabetes mellitus with complication, with long-term current use of insulin (CMS/[facility])    • Controlled type 2 diabetes mellitus with complication, with long-term current use of insulin (CMS/[facility]) Yes   70-year-old male patient here today for follow-up visit.  She has been seen for the above-mentioned problems.  She brought with her external labs which were reviewed.  According to her most recent A1c she is at 6.6.  She does have high LDL cholesterol.  She is on atorvastatin maximum dose.  She is taking her medications as prescribed.  She is on a continuous glucose monitoring sensor.  She states she has problems getting her supplies from [facility].  I have asked staff to reach out to them to find out what it is we need to do on our end to assure that patient gets her supplies.  She is on multiple daily insulin injections.  She denies any significant hypoglycemic events.      The following portions of the patient's history were reviewed and updated as appropriate: allergies, current medications, past family history, past medical history, past social history, past surgical history and problem list.    Review of Systems   Constitutional: Positive for fatigue (ANEMIA LOW FERRITIN ). Negative for appetite change and unexpected weight change.    Eyes: Negative for visual disturbance.   Cardiovascular: Negative for chest pain and leg swelling.   Gastrointestinal: Positive for nausea. Negative for constipation and diarrhea.   Endocrine: Negative for cold intolerance, heat intolerance and polydipsia.   Genitourinary: Positive for difficulty urinating (RECENT UTI ) and dysuria (RECENT UTI ). Negative for frequency.   Neurological: Negative for dizziness, numbness and headaches.       Objective   Physical Exam   Constitutional: She is oriented to person, place, and time. She appears well-developed and well-nourished. No distress.   HENT:   Head: Normocephalic and atraumatic.   Right Ear: External ear normal.   Left Ear: External ear normal.   Nose: Nose normal.   Mouth/Throat: Oropharynx is clear and moist. No oropharyngeal exudate.   Eyes: Pupils are equal, round, and reactive to light. EOM are normal. Right eye exhibits no discharge. Left eye exhibits no discharge.   Neck: Trachea normal, normal range of motion and full passive range of motion without pain. Neck supple. No tracheal tenderness present. Carotid bruit is not present. No tracheal deviation, no edema and no erythema present. No thyroid mass and no thyromegaly present.   Cardiovascular: Normal rate, regular rhythm, normal heart sounds and intact distal pulses. Exam reveals no gallop and no friction rub.   No murmur heard.  Pulmonary/Chest: Effort normal and breath sounds normal. No stridor. No respiratory distress. She has no wheezes. She has no rales.   Abdominal: Soft. Bowel sounds are normal. She exhibits no distension.   Musculoskeletal: Normal range of motion. She exhibits no edema or deformity.   Lymphadenopathy:     She has no cervical adenopathy.   Neurological: She is alert and oriented to person, place, and time.   Skin: Skin is warm and dry. No rash noted. She is not diaphoretic. No erythema. No pallor.   Psychiatric: She has a normal mood and affect. Her behavior is normal. Judgment  and thought content normal.   Nursing note and vitals reviewed.    Results for orders placed or performed in visit on 06/22/20   Comprehensive Metabolic Panel   Result Value Ref Range    Glucose 192 (H) 74 - 124 mg/dL    BUN 26 (H) 6 - 20 mg/dL    Creatinine 0.86 0.60 - 1.10 mg/dL    Sodium 138 134 - 145 mmol/L    Potassium 4.5 3.5 - 4.7 mmol/L    Chloride 103 98 - 107 mmol/L    CO2 23.4 22.0 - 29.0 mmol/L    Calcium 9.6 8.5 - 10.2 mg/dL    Total Protein 6.5 6.3 - 8.0 g/dL    Albumin 4.00 3.50 - 5.20 g/dL    ALT (SGPT) 19 0 - 33 U/L    AST (SGOT) 19 0 - 32 U/L    Alkaline Phosphatase 37 (L) 38 - 116 U/L    Total Bilirubin 0.5 0.2 - 1.2 mg/dL    eGFR Non African Amer 65 >60 mL/min/1.73    Globulin 2.5 1.8 - 3.5 gm/dL    A/G Ratio 1.6 1.1 - 2.4 g/dL    BUN/Creatinine Ratio 30.2 (H) 7.3 - 30.0    Anion Gap 11.6 5.0 - 15.0 mmol/L   Ferritin   Result Value Ref Range    Ferritin 35.60 13.00 - 150.00 ng/mL   Iron Profile   Result Value Ref Range    Iron 64 37 - 145 mcg/dL    Iron Saturation 14 14 - 48 %    Transferrin 338 200 - 360 mg/dL    TIBC 473 249 - 505 mcg/dL   Retic With IRF & RET-He   Result Value Ref Range    Immature Reticulocyte Fraction 15.9 (H) 3.0 - 15.8 %    Reticulocyte % 2.12 (H) 0.70 - 1.90 %    Reticulocyte Hgb 32.1 29.8 - 36.1 pg   Vitamin B12   Result Value Ref Range    Vitamin B-12 362 211 - 946 pg/mL   CBC Auto Differential   Result Value Ref Range    WBC 8.11 3.40 - 10.80 10*3/mm3    RBC 4.47 3.77 - 5.28 10*6/mm3    Hemoglobin 12.4 12.0 - 15.9 g/dL    Hematocrit 38.9 34.0 - 46.6 %    MCV 87.0 79.0 - 97.0 fL    MCH 27.7 26.6 - 33.0 pg    MCHC 31.9 31.5 - 35.7 g/dL    RDW 14.5 12.3 - 15.4 %    RDW-SD 46.2 37.0 - 54.0 fl    MPV 10.8 6.0 - 12.0 fL    Platelets 234 140 - 450 10*3/mm3    Neutrophil % 64.1 42.7 - 76.0 %    Lymphocyte % 20.1 19.6 - 45.3 %    Monocyte % 7.0 5.0 - 12.0 %    Eosinophil % 7.2 (H) 0.3 - 6.2 %    Basophil % 0.9 0.0 - 1.5 %    Immature Grans % 0.7 (H) 0.0 - 0.5 %    Neutrophils,  Absolute 5.20 1.70 - 7.00 10*3/mm3    Lymphocytes, Absolute 1.63 0.70 - 3.10 10*3/mm3    Monocytes, Absolute 0.57 0.10 - 0.90 10*3/mm3    Eosinophils, Absolute 0.58 (H) 0.00 - 0.40 10*3/mm3    Basophils, Absolute 0.07 0.00 - 0.20 10*3/mm3    Immature Grans, Absolute 0.06 (H) 0.00 - 0.05 10*3/mm3    nRBC 0.0 0.0 - 0.2 /100 WBC       Assessment/Plan   Problems Addressed this Visit        Cardiovascular and Mediastinum    Essential hypertension    HLD (hyperlipidemia)       Endocrine    Controlled type 2 diabetes mellitus with complication, with long-term current use of insulin (CMS/[facility]) - Primary    Relevant Medications    insulin aspart (NovoLOG FlexPen) 100 UNIT/ML solution pen-injector sc pen    Hypothyroidism      Other Visit Diagnoses     Uncontrolled type 2 diabetes mellitus with complication, with long-term current use of insulin (CMS/[facility])        Relevant Medications    insulin aspart (NovoLOG FlexPen) 100 UNIT/ML solution pen-injector sc pen            Patient was seen and examined.  She has external labs which were reviewed.  I will request they be scanned into her medical record.  Her most recent A1c is 6.6.  Her sensor download was reviewed.  According to her Dexcom her average blood sugar is 149.  She is in target range 83.3% of the time and above target 17% of the time.  She has had no hypoglycemic events.  Metabolically and clinically she is stable.  She gets her Dexcom supplies from [facility].  I requested staff contact them to see if there is anything that they need from us for patient to get her supplies.  She will follow-up in 6 months with myself or another provider of her choice with labs prior.  She has been encouraged to reach out via my chart should she have any questions or concerns prior to her next visit.

## 2020-08-18 ENCOUNTER — TELEPHONE (OUTPATIENT)
Dept: ENDOCRINOLOGY | Age: 70
End: 2020-08-18

## 2020-08-18 NOTE — TELEPHONE ENCOUNTER
----- Message from SHERI Farooq sent at 8/18/2020  8:49 AM EDT -----      ----- Message -----  From: Joan Wen MA  Sent: 8/18/2020   7:14 AM EDT  To: SHERI Farooq    This was sent to the wrong person  ----- Message -----  From: Selma Skaggs APRN  Sent: 8/17/2020  12:35 PM EDT  To: Joan Wen MA    Can you reach out to this company to make sure they have what they need from us to get her dexcom supplies. She has problems with getting them and they tell patient we are not sending the info.,    [facility] [phone]  fax [phone]  ozzy velasquez

## 2020-08-21 ENCOUNTER — TELEPHONE (OUTPATIENT)
Dept: ONCOLOGY | Facility: CLINIC | Age: 70
End: 2020-08-21

## 2020-08-21 ENCOUNTER — TELEPHONE (OUTPATIENT)
Dept: ONCOLOGY | Facility: HOSPITAL | Age: 70
End: 2020-08-21

## 2020-08-21 NOTE — PROGRESS NOTES
Supportive plan entered for Injectafer 750 mg IV x 2 doses per in-basket message from ERICA Keen RN, ZITA Lobato

## 2020-08-21 NOTE — TELEPHONE ENCOUNTER
Ferritin level 23 in PCP office on the 19 th. She wants to know if she can get an iron infusion. In basket message to Dr Lobato. I instructed her we would call her back. She verbalized understanding.

## 2020-08-21 NOTE — TELEPHONE ENCOUNTER
----- Message from Axel Lobato MD sent at 8/21/2020 11:16 AM EDT -----  Technically that's still normal and she's not really anemic, but if she's symptomatic can you arrange two doses of IV Injectafer?  Thanks, [facility]    ----- Message -----  From: Toma Keen RN  Sent: 8/21/2020  10:04 AM EDT  To: Axel Lobato MD    Ferritin level 23 in PCP office 8/19. Is in epic. She wants to know if she should get an iron infusion. She feels week.  Aed  They only did ferritin.

## 2020-08-26 ENCOUNTER — INFUSION (OUTPATIENT)
Dept: ONCOLOGY | Facility: HOSPITAL | Age: 70
End: 2020-08-26

## 2020-08-26 VITALS
OXYGEN SATURATION: 97 % | BODY MASS INDEX: 24.92 KG/M2 | RESPIRATION RATE: 12 BRPM | TEMPERATURE: 97.5 F | WEIGHT: 127.6 LBS | SYSTOLIC BLOOD PRESSURE: 128 MMHG | DIASTOLIC BLOOD PRESSURE: 74 MMHG | HEART RATE: 83 BPM

## 2020-08-26 DIAGNOSIS — D50.9 IRON DEFICIENCY ANEMIA, UNSPECIFIED IRON DEFICIENCY ANEMIA TYPE: ICD-10-CM

## 2020-08-26 DIAGNOSIS — T45.4X5D ADVERSE EFFECT OF IRON, SUBSEQUENT ENCOUNTER: Primary | ICD-10-CM

## 2020-08-26 PROCEDURE — 25010000002 FERRIC CARBOXYMALTOSE 750 MG/15ML SOLUTION 15 ML VIAL: Performed by: INTERNAL MEDICINE

## 2020-08-26 PROCEDURE — 63710000001 PROCHLORPERAZINE MALEATE PER 5 MG: Performed by: INTERNAL MEDICINE

## 2020-08-26 PROCEDURE — 96374 THER/PROPH/DIAG INJ IV PUSH: CPT

## 2020-08-26 RX ORDER — SODIUM CHLORIDE 9 MG/ML
250 INJECTION, SOLUTION INTRAVENOUS ONCE
Status: COMPLETED | OUTPATIENT
Start: 2020-08-26 | End: 2020-08-26

## 2020-08-26 RX ORDER — PROCHLORPERAZINE MALEATE 5 MG/1
10 TABLET ORAL ONCE
Status: COMPLETED | OUTPATIENT
Start: 2020-08-26 | End: 2020-08-26

## 2020-08-26 RX ADMIN — FERRIC CARBOXYMALTOSE INJECTION 750 MG: 50 INJECTION, SOLUTION INTRAVENOUS at 13:03

## 2020-08-26 RX ADMIN — PROCHLORPERAZINE MALEATE 10 MG: 5 TABLET ORAL at 12:59

## 2020-08-26 RX ADMIN — SODIUM CHLORIDE 250 ML: 9 INJECTION, SOLUTION INTRAVENOUS at 13:03

## 2020-09-02 ENCOUNTER — INFUSION (OUTPATIENT)
Dept: ONCOLOGY | Facility: HOSPITAL | Age: 70
End: 2020-09-02

## 2020-09-02 VITALS
SYSTOLIC BLOOD PRESSURE: 147 MMHG | OXYGEN SATURATION: 98 % | DIASTOLIC BLOOD PRESSURE: 74 MMHG | WEIGHT: 127.8 LBS | RESPIRATION RATE: 16 BRPM | TEMPERATURE: 97.3 F | BODY MASS INDEX: 24.96 KG/M2 | HEART RATE: 70 BPM

## 2020-09-02 DIAGNOSIS — T45.4X5D ADVERSE EFFECT OF IRON, SUBSEQUENT ENCOUNTER: Primary | ICD-10-CM

## 2020-09-02 DIAGNOSIS — D50.9 IRON DEFICIENCY ANEMIA, UNSPECIFIED IRON DEFICIENCY ANEMIA TYPE: ICD-10-CM

## 2020-09-02 PROCEDURE — 63710000001 PROCHLORPERAZINE MALEATE PER 5 MG: Performed by: INTERNAL MEDICINE

## 2020-09-02 PROCEDURE — 25010000002 FERRIC CARBOXYMALTOSE 750 MG/15ML SOLUTION 15 ML VIAL: Performed by: INTERNAL MEDICINE

## 2020-09-02 PROCEDURE — 96374 THER/PROPH/DIAG INJ IV PUSH: CPT

## 2020-09-02 RX ORDER — PROCHLORPERAZINE MALEATE 5 MG/1
10 TABLET ORAL ONCE
Status: COMPLETED | OUTPATIENT
Start: 2020-09-02 | End: 2020-09-02

## 2020-09-02 RX ORDER — SODIUM CHLORIDE 9 MG/ML
250 INJECTION, SOLUTION INTRAVENOUS ONCE
Status: COMPLETED | OUTPATIENT
Start: 2020-09-02 | End: 2020-09-02

## 2020-09-02 RX ADMIN — SODIUM CHLORIDE 250 ML: 0.9 INJECTION, SOLUTION INTRAVENOUS at 13:51

## 2020-09-02 RX ADMIN — FERRIC CARBOXYMALTOSE INJECTION 750 MG: 50 INJECTION, SOLUTION INTRAVENOUS at 13:53

## 2020-09-02 RX ADMIN — PROCHLORPERAZINE MALEATE 10 MG: 5 TABLET ORAL at 13:51

## 2020-09-02 NOTE — NURSING NOTE
Pt was scheduled to see Dr. Lobato in 1 month with labs the week before that (9/28).  She wants to know if he wants to see her that soon after her IV iron infusions. I s/w Dr. Lobato. He said we can push MD visit back 2 months from beginning of October with labs the week before.  Informed pt and she v/u. Message sent to scheduling to push appts back 2 months.

## 2020-10-28 ENCOUNTER — LAB (OUTPATIENT)
Dept: LAB | Facility: HOSPITAL | Age: 70
End: 2020-10-28

## 2020-10-28 DIAGNOSIS — E53.8 B12 DEFICIENCY: ICD-10-CM

## 2020-10-28 DIAGNOSIS — D50.9 IRON DEFICIENCY ANEMIA, UNSPECIFIED IRON DEFICIENCY ANEMIA TYPE: ICD-10-CM

## 2020-10-28 LAB
ALBUMIN SERPL-MCNC: 4.1 G/DL (ref 3.5–5.2)
ALBUMIN/GLOB SERPL: 1.9 G/DL (ref 1.1–2.4)
ALP SERPL-CCNC: 34 U/L (ref 38–116)
ALT SERPL W P-5'-P-CCNC: 19 U/L (ref 0–33)
ANION GAP SERPL CALCULATED.3IONS-SCNC: 10 MMOL/L (ref 5–15)
AST SERPL-CCNC: 17 U/L (ref 0–32)
BASOPHILS # BLD AUTO: 0.04 10*3/MM3 (ref 0–0.2)
BASOPHILS NFR BLD AUTO: 0.5 % (ref 0–1.5)
BILIRUB SERPL-MCNC: 0.7 MG/DL (ref 0.2–1.2)
BUN SERPL-MCNC: 23 MG/DL (ref 6–20)
BUN/CREAT SERPL: 23.7 (ref 7.3–30)
CALCIUM SPEC-SCNC: 9.9 MG/DL (ref 8.5–10.2)
CHLORIDE SERPL-SCNC: 104 MMOL/L (ref 98–107)
CO2 SERPL-SCNC: 25 MMOL/L (ref 22–29)
CREAT SERPL-MCNC: 0.97 MG/DL (ref 0.6–1.1)
DEPRECATED RDW RBC AUTO: 47.3 FL (ref 37–54)
EOSINOPHIL # BLD AUTO: 0.62 10*3/MM3 (ref 0–0.4)
EOSINOPHIL NFR BLD AUTO: 8.2 % (ref 0.3–6.2)
ERYTHROCYTE [DISTWIDTH] IN BLOOD BY AUTOMATED COUNT: 14.6 % (ref 12.3–15.4)
FERRITIN SERPL-MCNC: 897.9 NG/ML (ref 13–150)
GFR SERPL CREATININE-BSD FRML MDRD: 56 ML/MIN/1.73
GLOBULIN UR ELPH-MCNC: 2.2 GM/DL (ref 1.8–3.5)
GLUCOSE SERPL-MCNC: 133 MG/DL (ref 74–124)
HCT VFR BLD AUTO: 39.4 % (ref 34–46.6)
HGB BLD-MCNC: 13.3 G/DL (ref 12–15.9)
HGB RETIC QN AUTO: 35.1 PG (ref 29.8–36.1)
IMM GRANULOCYTES # BLD AUTO: 0.05 10*3/MM3 (ref 0–0.05)
IMM GRANULOCYTES NFR BLD AUTO: 0.7 % (ref 0–0.5)
IMM RETICS NFR: 11.5 % (ref 3–15.8)
IRON 24H UR-MRATE: 109 MCG/DL (ref 37–145)
IRON SATN MFR SERPL: 28 % (ref 14–48)
LYMPHOCYTES # BLD AUTO: 1.54 10*3/MM3 (ref 0.7–3.1)
LYMPHOCYTES NFR BLD AUTO: 20.4 % (ref 19.6–45.3)
MCH RBC QN AUTO: 30.1 PG (ref 26.6–33)
MCHC RBC AUTO-ENTMCNC: 33.8 G/DL (ref 31.5–35.7)
MCV RBC AUTO: 89.1 FL (ref 79–97)
MONOCYTES # BLD AUTO: 0.53 10*3/MM3 (ref 0.1–0.9)
MONOCYTES NFR BLD AUTO: 7 % (ref 5–12)
NEUTROPHILS NFR BLD AUTO: 4.76 10*3/MM3 (ref 1.7–7)
NEUTROPHILS NFR BLD AUTO: 63.2 % (ref 42.7–76)
NRBC BLD AUTO-RTO: 0 /100 WBC (ref 0–0.2)
PLATELET # BLD AUTO: 238 10*3/MM3 (ref 140–450)
PMV BLD AUTO: 10.6 FL (ref 6–12)
POTASSIUM SERPL-SCNC: 4.2 MMOL/L (ref 3.5–4.7)
PROT SERPL-MCNC: 6.3 G/DL (ref 6.3–8)
RBC # BLD AUTO: 4.42 10*6/MM3 (ref 3.77–5.28)
RETICS # AUTO: 0.1 10*6/MM3 (ref 0.02–0.13)
RETICS/RBC NFR AUTO: 2.24 % (ref 0.7–1.9)
SODIUM SERPL-SCNC: 139 MMOL/L (ref 134–145)
TIBC SERPL-MCNC: 391 MCG/DL (ref 249–505)
TRANSFERRIN SERPL-MCNC: 279 MG/DL (ref 200–360)
VIT B12 BLD-MCNC: 425 PG/ML (ref 211–946)
WBC # BLD AUTO: 7.54 10*3/MM3 (ref 3.4–10.8)

## 2020-10-28 PROCEDURE — 82607 VITAMIN B-12: CPT | Performed by: INTERNAL MEDICINE

## 2020-10-28 PROCEDURE — 36415 COLL VENOUS BLD VENIPUNCTURE: CPT

## 2020-10-28 PROCEDURE — 85025 COMPLETE CBC W/AUTO DIFF WBC: CPT

## 2020-10-28 PROCEDURE — 83540 ASSAY OF IRON: CPT

## 2020-10-28 PROCEDURE — 84466 ASSAY OF TRANSFERRIN: CPT

## 2020-10-28 PROCEDURE — 82728 ASSAY OF FERRITIN: CPT

## 2020-10-28 PROCEDURE — 80053 COMPREHEN METABOLIC PANEL: CPT

## 2020-10-28 PROCEDURE — 85046 RETICYTE/HGB CONCENTRATE: CPT

## 2020-11-03 NOTE — PROGRESS NOTES
[facility] GROUP OUTPATIENT FOLLOW UP CLINIC VISIT    REASON FOR FOLLOW-UP:    1.  Iron deficiency anemia.  Her last intravenous Feraheme infusions or in October 2015.  Injectafer administered on 1/4/2019.  Recurrent iron deficiency with 2 doses of Injectafer on 8/26/2020 and 9/2/2020.    HISTORY OF PRESENT ILLNESS:  Shirin Washington is a 70 y.o. female who returns today for follow up of the above issue.    Ferritin was 23 with a hgb of 11.8 on 8/19/2020. She received two doses of IV Injectafer on 8/26 and 9/2/2020.    Labs 10/28 with hgb 13.3, ferritin 897, iron 109 with 28% iron saturation. B12 425.      She is having persistent but intermittent issues with vertigo.  She continues to have fatigue which has not improved as much as she would like after the intravenous iron.  She also has worsening early satiety and nausea after eating and wonders if she needs a GI evaluation as it has been hypothesized in the past that she has gastroparesis from diabetes.  She denies any bleeding.    PAST MEDICAL, SURGICAL, FAMILY, AND SOCIAL HISTORIES were reviewed with the patient and in the electronic medical record, and were updated if indicated.    ALLERGIES:  Allergies   Allergen Reactions   • Topamax [Topiramate] Unknown - Low Severity       MEDICATIONS:  The medication list has been reviewed with the patient by the medical assistant, and the list has been updated in the electronic medical record, which I reviewed.  Medication dosages and frequencies were confirmed to be accurate.    REVIEW OF SYSTEMS:  PAIN:  See Vital Signs below.  GENERAL:  No fevers, chills, night sweats, or unintended weight loss.  Fatigue.    SKIN:  No rashes or non-healing lesions.  Mild alopecia  HEME/LYMPH:  No abnormal bleeding.  No palpable lymphadenopathy.  EYES:  No vision changes or diplopia.  ENT:  Vertigo which has improved  RESPIRATORY:  No cough, shortness of breath, hemoptysis, or wheezing.  CARDIOVASCULAR:  No chest pain or  "shortness of breath  GASTROINTESTINAL:  Abdominal bloating and early satiety.   GENITOURINARY:  No dysuria or hematuria.  MUSCULOSKELETAL: Chronic back pain which has improved overall  NEUROLOGIC: Vertigo  PSYCHIATRIC:  No depression, anxiety, or mood changes.    Vitals:    11/04/20 1031   BP: 154/81   Pulse: 71   Resp: 16   Temp: 98.4 °F (36.9 °C)   TempSrc: Temporal   SpO2: 98%   Weight: 58.4 kg (128 lb 11.2 oz)   Height: 152.4 cm (60\")   PainSc: 0-No pain  Comment: B12 deficiency       PHYSICAL EXAMINATION:  GENERAL:  Well-developed well-nourished female; awake, alert and oriented, in no acute distress.  Wearing a facemask.  Lymphatics: No cervical supraclavicular or axillary adenopathy  Chest: Lungs clear to auscultation bilaterally  Heart: No murmurs gallops or rubs.  Regular rate and rhythm.  Abdomen: Soft, nontender, mildly distended, normal active bowel sounds  Extremities: Warm and well perfused with no clubbing cyanosis or edema      DIAGNOSTIC DATA:  Results for orders placed or performed in visit on 10/28/20   Comprehensive Metabolic Panel    Specimen: Blood   Result Value Ref Range    Glucose 133 (H) 74 - 124 mg/dL    BUN 23 (H) 6 - 20 mg/dL    Creatinine 0.97 0.60 - 1.10 mg/dL    Sodium 139 134 - 145 mmol/L    Potassium 4.2 3.5 - 4.7 mmol/L    Chloride 104 98 - 107 mmol/L    CO2 25.0 22.0 - 29.0 mmol/L    Calcium 9.9 8.5 - 10.2 mg/dL    Total Protein 6.3 6.3 - 8.0 g/dL    Albumin 4.10 3.50 - 5.20 g/dL    ALT (SGPT) 19 0 - 33 U/L    AST (SGOT) 17 0 - 32 U/L    Alkaline Phosphatase 34 (L) 38 - 116 U/L    Total Bilirubin 0.7 0.2 - 1.2 mg/dL    eGFR Non African Amer 56 (L) >60 mL/min/1.73    Globulin 2.2 1.8 - 3.5 gm/dL    A/G Ratio 1.9 1.1 - 2.4 g/dL    BUN/Creatinine Ratio 23.7 7.3 - 30.0    Anion Gap 10.0 5.0 - 15.0 mmol/L   Ferritin    Specimen: Blood   Result Value Ref Range    Ferritin 897.90 (H) 13.00 - 150.00 ng/mL   Iron Profile    Specimen: Blood   Result Value Ref Range    Iron 109 37 - 145 " mcg/dL    Iron Saturation 28 14 - 48 %    Transferrin 279 200 - 360 mg/dL    TIBC 391 249 - 505 mcg/dL   Retic With IRF & RET-He    Specimen: Blood   Result Value Ref Range    Immature Reticulocyte Fraction 11.5 3.0 - 15.8 %    Reticulocyte % 2.24 (H) 0.70 - 1.90 %    Reticulocyte Absolute 0.0990 0.0200 - 0.1300 10*6/mm3    Reticulocyte Hgb 35.1 29.8 - 36.1 pg   Vitamin B12    Specimen: Blood   Result Value Ref Range    Vitamin B-12 425 211 - 946 pg/mL   CBC Auto Differential    Specimen: Blood   Result Value Ref Range    WBC 7.54 3.40 - 10.80 10*3/mm3    RBC 4.42 3.77 - 5.28 10*6/mm3    Hemoglobin 13.3 12.0 - 15.9 g/dL    Hematocrit 39.4 34.0 - 46.6 %    MCV 89.1 79.0 - 97.0 fL    MCH 30.1 26.6 - 33.0 pg    MCHC 33.8 31.5 - 35.7 g/dL    RDW 14.6 12.3 - 15.4 %    RDW-SD 47.3 37.0 - 54.0 fl    MPV 10.6 6.0 - 12.0 fL    Platelets 238 140 - 450 10*3/mm3    Neutrophil % 63.2 42.7 - 76.0 %    Lymphocyte % 20.4 19.6 - 45.3 %    Monocyte % 7.0 5.0 - 12.0 %    Eosinophil % 8.2 (H) 0.3 - 6.2 %    Basophil % 0.5 0.0 - 1.5 %    Immature Grans % 0.7 (H) 0.0 - 0.5 %    Neutrophils, Absolute 4.76 1.70 - 7.00 10*3/mm3    Lymphocytes, Absolute 1.54 0.70 - 3.10 10*3/mm3    Monocytes, Absolute 0.53 0.10 - 0.90 10*3/mm3    Eosinophils, Absolute 0.62 (H) 0.00 - 0.40 10*3/mm3    Basophils, Absolute 0.04 0.00 - 0.20 10*3/mm3    Immature Grans, Absolute 0.05 0.00 - 0.05 10*3/mm3    nRBC 0.0 0.0 - 0.2 /100 WBC         ASSESSMENT:  This is a 70 y.o. female with:  *History of iron deficiency anemia: She required intravenous Feraheme last in October 2015 and we gave 2 more doses in September and October 2017.  No obvious GI blood loss.  I suspect she has some malabsorption.  She has symptoms consistent with gastroparesis.  She also received vitamin B12 injections in the past and she received 2 injections with her Feraheme infusions.  At the end of 2018 her ferritin had dropped considerably.  Therefore, we proceeded with one dose of  Injectafer.  · Ferritin was 23 with a hgb of 11.8 on 8/19/2020. She received two doses of IV Injectafer on 8/26 and 9/2/2020.  · Labs 10/28 with hgb 13.3, ferritin 897, iron 109 with 28% iron saturation. B12 425.      *Fatigue: Improved on a thyroid supplement although persists    *Gastroparesis: Symptoms are worsening with nausea after eating and early satiety.    *B12 deficiency: B12 level is drifting down a little bit.  B12 level 362 on 6/23/2020.  She continues an oral supplement. B12 now 425.    *Chronic back pain: Stable    PLAN:  1. I will have her follow-up in 6 months with a CBC, CMP, reticulocyte count, ferritin, iron profile  2. She will follow-up with primary care for potential referral to gastroenterology for further evaluation of her other symptoms.  3. Follow-up with endocrinology as scheduled for her diabetes    25 minutes.  15 minutes spent counseling her regarding her lab values and gastrointestinal issues.

## 2020-11-04 ENCOUNTER — APPOINTMENT (OUTPATIENT)
Dept: LAB | Facility: HOSPITAL | Age: 70
End: 2020-11-04

## 2020-11-04 ENCOUNTER — OFFICE VISIT (OUTPATIENT)
Dept: ONCOLOGY | Facility: CLINIC | Age: 70
End: 2020-11-04

## 2020-11-04 VITALS
HEART RATE: 71 BPM | HEIGHT: 60 IN | TEMPERATURE: 98.4 F | BODY MASS INDEX: 25.27 KG/M2 | SYSTOLIC BLOOD PRESSURE: 154 MMHG | OXYGEN SATURATION: 98 % | DIASTOLIC BLOOD PRESSURE: 81 MMHG | WEIGHT: 128.7 LBS | RESPIRATION RATE: 16 BRPM

## 2020-11-04 DIAGNOSIS — D50.9 IRON DEFICIENCY ANEMIA, UNSPECIFIED IRON DEFICIENCY ANEMIA TYPE: Primary | ICD-10-CM

## 2020-11-04 PROCEDURE — 99214 OFFICE O/P EST MOD 30 MIN: CPT | Performed by: INTERNAL MEDICINE

## 2020-11-04 RX ORDER — INFLUENZA A VIRUS A/MICHIGAN/45/2015 X-275 (H1N1) ANTIGEN (FORMALDEHYDE INACTIVATED), INFLUENZA A VIRUS A/SINGAPORE/INFIMH-16-0019/2016 IVR-186 (H3N2) ANTIGEN (FORMALDEHYDE INACTIVATED), INFLUENZA B VIRUS B/PHUKET/3073/2013 ANTIGEN (FORMALDEHYDE INACTIVATED), AND INFLUENZA B VIRUS B/MARYLAND/15/2016 BX-69A ANTIGEN (FORMALDEHYDE INACTIVATED) 60; 60; 60; 60 UG/.7ML; UG/.7ML; UG/.7ML; UG/.7ML
INJECTION, SUSPENSION INTRAMUSCULAR
COMMUNITY
Start: 2020-10-05

## 2020-11-04 RX ORDER — AMLODIPINE BESYLATE 5 MG/1
TABLET ORAL
COMMUNITY
Start: 2020-10-24 | End: 2020-11-04 | Stop reason: DRUGHIGH

## 2020-12-01 ENCOUNTER — TRANSCRIBE ORDERS (OUTPATIENT)
Dept: ADMINISTRATIVE | Facility: HOSPITAL | Age: 70
End: 2020-12-01

## 2020-12-01 DIAGNOSIS — M81.0 OSTEOPOROSIS OF LUMBAR SPINE: Primary | ICD-10-CM

## 2020-12-03 ENCOUNTER — TELEPHONE (OUTPATIENT)
Dept: ONCOLOGY | Facility: CLINIC | Age: 70
End: 2020-12-03

## 2020-12-03 NOTE — TELEPHONE ENCOUNTER
Pt stated that getting labs done at dr perez's office today, advised no labs needed for Monday, df

## 2020-12-03 NOTE — TELEPHONE ENCOUNTER
Caller: HARJINDER DUNBAR    Relationship to patient: SELF    Best call back number: [phone]     PT IS SCHED FOR LABS AND PROLIA INJECTION 12/7. PT STATES SHE IS GETTING LAB WORK DONE TODAY 12/3 AND ASKS IF SHE NEEDS TO COME IN FOR LABS AT 3P ON 12/7

## 2020-12-07 ENCOUNTER — INFUSION (OUTPATIENT)
Dept: ONCOLOGY | Facility: HOSPITAL | Age: 70
End: 2020-12-07

## 2020-12-07 ENCOUNTER — APPOINTMENT (OUTPATIENT)
Dept: OTHER | Facility: HOSPITAL | Age: 70
End: 2020-12-07

## 2020-12-07 VITALS — TEMPERATURE: 98.2 F | WEIGHT: 129.2 LBS | RESPIRATION RATE: 18 BRPM | HEIGHT: 60 IN | BODY MASS INDEX: 25.36 KG/M2

## 2020-12-07 DIAGNOSIS — M81.0 OSTEOPOROSIS, POST-MENOPAUSAL: Primary | ICD-10-CM

## 2020-12-07 DIAGNOSIS — M81.0 OSTEOPOROSIS, UNSPECIFIED OSTEOPOROSIS TYPE, UNSPECIFIED PATHOLOGICAL FRACTURE PRESENCE: ICD-10-CM

## 2020-12-07 PROCEDURE — 96372 THER/PROPH/DIAG INJ SC/IM: CPT

## 2020-12-07 PROCEDURE — 25010000002 DENOSUMAB 60 MG/ML SOLUTION PREFILLED SYRINGE: Performed by: INTERNAL MEDICINE

## 2020-12-07 RX ADMIN — DENOSUMAB 60 MG: 60 INJECTION SUBCUTANEOUS at 15:45

## 2021-02-04 ENCOUNTER — LAB (OUTPATIENT)
Dept: ENDOCRINOLOGY | Age: 71
End: 2021-02-04

## 2021-02-04 DIAGNOSIS — E55.9 VITAMIN D DEFICIENCY: ICD-10-CM

## 2021-02-04 DIAGNOSIS — I10 ESSENTIAL HYPERTENSION: ICD-10-CM

## 2021-02-04 DIAGNOSIS — E11.8 CONTROLLED TYPE 2 DIABETES MELLITUS WITH COMPLICATION, WITH LONG-TERM CURRENT USE OF INSULIN (HCC): Primary | ICD-10-CM

## 2021-02-04 DIAGNOSIS — E78.5 HYPERLIPIDEMIA, UNSPECIFIED HYPERLIPIDEMIA TYPE: ICD-10-CM

## 2021-02-04 DIAGNOSIS — E03.9 HYPOTHYROIDISM, UNSPECIFIED TYPE: ICD-10-CM

## 2021-02-04 DIAGNOSIS — Z79.4 CONTROLLED TYPE 2 DIABETES MELLITUS WITH COMPLICATION, WITH LONG-TERM CURRENT USE OF INSULIN (HCC): Primary | ICD-10-CM

## 2021-02-04 DIAGNOSIS — Z79.4 CONTROLLED TYPE 2 DIABETES MELLITUS WITH COMPLICATION, WITH LONG-TERM CURRENT USE OF INSULIN (HCC): ICD-10-CM

## 2021-02-04 DIAGNOSIS — E11.8 CONTROLLED TYPE 2 DIABETES MELLITUS WITH COMPLICATION, WITH LONG-TERM CURRENT USE OF INSULIN (HCC): ICD-10-CM

## 2021-02-05 LAB
25(OH)D3+25(OH)D2 SERPL-MCNC: 34.4 NG/ML (ref 30–100)
ALBUMIN SERPL-MCNC: 4 G/DL (ref 3.5–5.2)
ALBUMIN/GLOB SERPL: 1.9 G/DL
ALP SERPL-CCNC: 33 U/L (ref 39–117)
ALT SERPL-CCNC: 19 U/L (ref 1–33)
AST SERPL-CCNC: 18 U/L (ref 1–32)
BILIRUB SERPL-MCNC: 0.6 MG/DL (ref 0–1.2)
BUN SERPL-MCNC: 22 MG/DL (ref 8–23)
BUN/CREAT SERPL: 26.5 (ref 7–25)
C PEPTIDE SERPL-MCNC: 1.9 NG/ML (ref 1.1–4.4)
CALCIUM SERPL-MCNC: 9.4 MG/DL (ref 8.6–10.5)
CHLORIDE SERPL-SCNC: 104 MMOL/L (ref 98–107)
CHOLEST SERPL-MCNC: 155 MG/DL (ref 0–200)
CO2 SERPL-SCNC: 25.2 MMOL/L (ref 22–29)
CREAT SERPL-MCNC: 0.83 MG/DL (ref 0.57–1)
GLOBULIN SER CALC-MCNC: 2.1 GM/DL
GLUCOSE SERPL-MCNC: 152 MG/DL (ref 65–99)
HBA1C MFR BLD: 6.6 % (ref 4.8–5.6)
HDLC SERPL-MCNC: 47 MG/DL (ref 40–60)
INTERPRETATION: NORMAL
LDLC SERPL CALC-MCNC: 87 MG/DL (ref 0–100)
Lab: NORMAL
POTASSIUM SERPL-SCNC: 4.9 MMOL/L (ref 3.5–5.2)
PROT SERPL-MCNC: 6.1 G/DL (ref 6–8.5)
SODIUM SERPL-SCNC: 137 MMOL/L (ref 136–145)
T3FREE SERPL-MCNC: 2.7 PG/ML (ref 2–4.4)
T4 FREE SERPL-MCNC: 1.68 NG/DL (ref 0.93–1.7)
TRIGL SERPL-MCNC: 115 MG/DL (ref 0–150)
TSH SERPL DL<=0.005 MIU/L-ACNC: 0.98 UIU/ML (ref 0.27–4.2)
VLDLC SERPL CALC-MCNC: 21 MG/DL (ref 5–40)

## 2021-02-17 RX ORDER — AMLODIPINE BESYLATE 5 MG/1
TABLET ORAL
COMMUNITY
Start: 2021-01-22 | End: 2021-05-11

## 2021-02-17 NOTE — PROGRESS NOTES
Subjective    Shirin Washington is a 70 y.o. female. she is here today for follow-up.  Chief Complaint   Patient presents with   • Diabetes     type 2 diabetes, recent labs, dexcom, checks BS 4x a day, eye exam 2020   • Hypothyroidism     There were no vitals taken for this visit.  You have chosen to receive care through a telephone visit. Do you consent to use a telephone visit for your medical care today? Yes  Total time 28 min     History of Present Illness   Encounter Diagnoses   Name Primary?   • Controlled type 2 diabetes mellitus with complication, with long-term current use of insulin (CMS/[facility]) Yes   • Hypothyroidism, unspecified type    • Hyperlipidemia, unspecified hyperlipidemia type      70-year-old female patient evaluated today via telehealth for the above diagnoses.  She has a history of type 2 diabetes, hypothyroidism, dyslipidemia.  She had previous labs which were reviewed.  She reports no neuropathy.  She is currently on the Dexcom continuous glucose monitoring sensors.  She is requesting that we reach out to provide advanced diabetes and plan with the need to ensure that she gets her supplies on time.  Her most recent A1c is 6.6.  She denies any hypoglycemic events.  Her lowest blood sugar has been 70.  He does not tolerate any type of supplement of vitamin D.  She does do Prolia injections every 6 months.  She watches her weight and states that she does eat carbs been doing feculent sugars.  She been stressed being at home during the pandemic because her  has been ill.  She normally exercises but has not lately.  Her weight has remained about the same.    The following portions of the patient's history were reviewed and updated as appropriate:   Past Medical History:   Diagnosis Date   • Anemia    • Arthritis    • Back pain    • Background diabetic retinopathy of right eye determined by examination (CMS/[facility])    • Coronary atherosclerosis     cath 1/2014 with 50% mid LAD, otherwise  unremarkable   • Diabetes mellitus type 2, insulin dependent (CMS/HCC)    • Gastroparesis due to secondary diabetes (CMS/HCC)    • GERD (gastroesophageal reflux disease)    • History of spinal stenosis    • History of spondylolisthesis    • History of URI (upper respiratory infection)    • Hypercholesteremia    • Hyperlipidemia    • Hypertension    • Iron deficiency anemia    • Left-sided weakness     ARM AND LEG   • Leukocytosis     MILD   • Osteopenia    • Osteoporosis    • Peripheral neuropathy    • Retinopathy    • Vertigo      Past Surgical History:   Procedure Laterality Date   • BACK SURGERY  2015    L4-L5 laminectomy, L4-L5, L5-S1 fusion by Dr. Urbina   • CARDIAC CATHETERIZATION      ARTERIAL CATHETERIZATION   • CATARACT EXTRACTION     • CHOLECYSTECTOMY     • HYSTERECTOMY     • KNEE SURGERY Right     Arthroscopy   • VAGINAL HYSTERECTOMY      fibroids, abnormal bleeding     Family History   Problem Relation Age of Onset   • Polycythemia Mother         Progressed to secondary myelofibrosis   • Cirrhosis Father    • Diabetes Other    • Hypertension Other    • Cancer Neg Hx    • Breast cancer Neg Hx    • Ovarian cancer Neg Hx    • Uterine cancer Neg Hx    • Colon cancer Neg Hx    • Deep vein thrombosis Neg Hx    • Pulmonary embolism Neg Hx      OB History        2    Para   2    Term   2            AB        Living   2       SAB        TAB        Ectopic        Molar        Multiple        Live Births   2              Current Outpatient Medications   Medication Sig Dispense Refill   • ALPRAZolam (XANAX) 0.25 MG tablet 0.25 mg 3 (Three) Times a Day As Needed.     • amLODIPine (NORVASC) 2.5 MG tablet Take 5 mg by mouth Daily.     • aspirin 81 MG tablet      • atenolol (TENORMIN) 50 MG tablet Take 1 tablet by mouth Daily. 30 tablet 6   • atorvastatin (LIPITOR) 80 MG tablet Take 80 mg by mouth Every Night.     • denosumab (PROLIA) 60 MG/ML solution syringe Inject 60 mg under the skin Every 6  (Six) Months.     • fenofibrate (TRICOR) 145 MG tablet Take 145 mg by mouth Daily.     • Fluzone High-Dose Quadrivalent 0.7 ML suspension prefilled syringe PHARMACY ADMINISTERED     • insulin aspart (NovoLOG FlexPen) 100 UNIT/ML solution pen-injector sc pen 15 UNITS SQ TID WITH MEALS 15 pen 1   • Insulin Pen Needle (BD PEN NEEDLE SOCO U/F) 32G X 4 MM misc 4 times daily 200 each 3   • meloxicam (MOBIC) 15 MG tablet Take 15 mg by mouth daily.     • nitroglycerin (NITROSTAT) 0.4 MG SL tablet Place 0.4 mg under the tongue Every 5 (Five) Minutes As Needed.     • ONETOUCH DELICA LANCETS 33G misc      • SYNTHROID 50 MCG tablet Take 1 tablet by mouth Daily. 30 tablet 6   • TRESIBA FLEXTOUCH 200 UNIT/ML solution pen-injector pen injection INJECT 30 UNITS UNDER THE SKIN DAILY (Patient taking differently: Inject 30 Units under the skin into the appropriate area as directed Daily. 20 UNITS QHS) 30 mL 1   • zolpidem (AMBIEN) 10 MG tablet Take 10 mg by mouth Every Night.       No current facility-administered medications for this visit.      Allergies   Allergen Reactions   • Topamax [Topiramate] Unknown - Low Severity     Social History     Socioeconomic History   • Marital status:      Spouse name: Yehuda   • Number of children: Not on file   • Years of education: Not on file   • Highest education level: Not on file   Occupational History   • Occupation: 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Employer: [employer]     Employer: RETIRED   Tobacco Use   • Smoking status: Never Smoker   • Smokeless tobacco: Never Used   Substance and Sexual Activity   • Alcohol use: Yes     Comment: OCCASIONAL   • Drug use: No   • Sexual activity: Not Currently     Birth control/protection: Surgical       Review of Systems  Review of Systems   Constitutional: Negative for appetite change and fatigue.   Eyes: Negative for visual disturbance.   Respiratory: Negative for cough.    Cardiovascular: Negative for leg swelling.    Gastrointestinal: Negative for constipation and diarrhea.   Endocrine: Negative for cold intolerance, heat intolerance, polydipsia, polyphagia and polyuria.   Genitourinary: Negative for frequency.   Neurological: Negative for numbness.        Objective    There were no vitals taken for this visit.  Physical Exam  Constitutional:       General: She is not in acute distress.     Appearance: She is well-developed.      Comments:  pleasant, no distress   Pulmonary:      Effort: Pulmonary effort is normal. No respiratory distress.   Neurological:      Mental Status: She is alert and oriented to person, place, and time.   Psychiatric:         Thought Content: Thought content normal.         Lab Review  Glucose (mg/dL)   Date Value   10/28/2020 133 (H)   06/22/2020 192 (H)   04/13/2020 158 (H)     Sodium (mmol/L)   Date Value   02/04/2021 137   10/28/2020 139   06/22/2020 138   05/05/2020 139   04/13/2020 139     Potassium (mmol/L)   Date Value   02/04/2021 4.9   10/28/2020 4.2   06/22/2020 4.5   05/05/2020 4.7   04/13/2020 4.4     Chloride (mmol/L)   Date Value   02/04/2021 104   10/28/2020 104   06/22/2020 103   05/05/2020 104   04/13/2020 103     CO2 (mmol/L)   Date Value   10/28/2020 25.0   06/22/2020 23.4   04/13/2020 25.0     Total CO2 (mmol/L)   Date Value   02/04/2021 25.2   05/05/2020 27.6     BUN (mg/dL)   Date Value   02/04/2021 22   10/28/2020 23 (H)   06/22/2020 26 (H)   05/05/2020 19   04/13/2020 23 (H)     Creatinine (mg/dL)   Date Value   02/04/2021 0.83   10/28/2020 0.97   06/22/2020 0.86   05/05/2020 0.91   04/13/2020 0.93     Hemoglobin A1C (%)   Date Value   02/04/2021 6.60 (H)   05/05/2020 6.72 (H)   10/25/2019 6.40 (H)     Triglycerides (mg/dL)   Date Value   02/04/2021 115   07/04/2014 100     LDL Cholesterol  (mg/dL)   Date Value   07/04/2014 88     LDL Chol Calc (NIH) (mg/dL)   Date Value   02/04/2021 87       Assessment/Plan    No diagnosis found..    Medications prescribed:  Outpatient  Encounter Medications as of 2/18/2021   Medication Sig Dispense Refill   • ALPRAZolam (XANAX) 0.25 MG tablet 0.25 mg 3 (Three) Times a Day As Needed.     • amLODIPine (NORVASC) 2.5 MG tablet Take 5 mg by mouth Daily.     • aspirin 81 MG tablet      • atenolol (TENORMIN) 50 MG tablet Take 1 tablet by mouth Daily. 30 tablet 6   • atorvastatin (LIPITOR) 80 MG tablet Take 80 mg by mouth Every Night.     • denosumab (PROLIA) 60 MG/ML solution syringe Inject 60 mg under the skin Every 6 (Six) Months.     • fenofibrate (TRICOR) 145 MG tablet Take 145 mg by mouth Daily.     • Fluzone High-Dose Quadrivalent 0.7 ML suspension prefilled syringe PHARMACY ADMINISTERED     • insulin aspart (NovoLOG FlexPen) 100 UNIT/ML solution pen-injector sc pen 15 UNITS SQ TID WITH MEALS 15 pen 1   • Insulin Pen Needle (BD PEN NEEDLE SOCO U/F) 32G X 4 MM misc 4 times daily 200 each 3   • meloxicam (MOBIC) 15 MG tablet Take 15 mg by mouth daily.     • nitroglycerin (NITROSTAT) 0.4 MG SL tablet Place 0.4 mg under the tongue Every 5 (Five) Minutes As Needed.     • ONETOUCH DELICA LANCETS 33G misc      • SYNTHROID 50 MCG tablet Take 1 tablet by mouth Daily. 30 tablet 6   • TRESIBA FLEXTOUCH 200 UNIT/ML solution pen-injector pen injection INJECT 30 UNITS UNDER THE SKIN DAILY (Patient taking differently: Inject 30 Units under the skin into the appropriate area as directed Daily. 20 UNITS QHS) 30 mL 1   • zolpidem (AMBIEN) 10 MG tablet Take 10 mg by mouth Every Night.       No facility-administered encounter medications on file as of 2/18/2021.        Orders placed during this encounter include:  No orders of the defined types were placed in this encounter.    Patient was evaluated via telehealth.  Her medication list was reviewed for accuracy.  She is currently on Prolia every 6 months.  She does not tolerate vitamin D.  Currently she is lacking any exercise because her  has been ill.  She states her weight is about the same.  She has asked  that we follow-up with advanced diabetes supplies so that she can get her Dexcom supplies.  Reports average blood sugar 170.  Her labs were reviewed and reflect her glycemic control.  Thyroid hormones are in satisfactory range.  Cholesterol is controlled.  Medications have been added or changed.  He will follow-up in the office in 6 months with labs prior

## 2021-02-18 ENCOUNTER — OFFICE VISIT (OUTPATIENT)
Dept: ENDOCRINOLOGY | Age: 71
End: 2021-02-18

## 2021-02-18 DIAGNOSIS — E11.8 CONTROLLED TYPE 2 DIABETES MELLITUS WITH COMPLICATION, WITH LONG-TERM CURRENT USE OF INSULIN (HCC): Primary | ICD-10-CM

## 2021-02-18 DIAGNOSIS — E03.9 HYPOTHYROIDISM, UNSPECIFIED TYPE: ICD-10-CM

## 2021-02-18 DIAGNOSIS — E78.5 HYPERLIPIDEMIA, UNSPECIFIED HYPERLIPIDEMIA TYPE: ICD-10-CM

## 2021-02-18 DIAGNOSIS — Z79.4 CONTROLLED TYPE 2 DIABETES MELLITUS WITH COMPLICATION, WITH LONG-TERM CURRENT USE OF INSULIN (HCC): Primary | ICD-10-CM

## 2021-02-18 PROCEDURE — 99443 PR PHYS/QHP TELEPHONE EVALUATION 21-30 MIN: CPT | Performed by: NURSE PRACTITIONER

## 2021-02-18 RX ORDER — INSULIN DEGLUDEC 200 U/ML
16 INJECTION, SOLUTION SUBCUTANEOUS DAILY
Qty: 7.2 ML | Refills: 1 | Status: SHIPPED | OUTPATIENT
Start: 2021-02-18 | End: 2021-05-19

## 2021-03-09 DIAGNOSIS — Z23 IMMUNIZATION DUE: ICD-10-CM

## 2021-05-10 NOTE — PROGRESS NOTES
"[facility] GROUP OUTPATIENT FOLLOW UP CLINIC VISIT    REASON FOR FOLLOW-UP:    1.  Iron deficiency anemia.  Her last intravenous Feraheme infusions or in October 2015.  Injectafer administered on 1/4/2019.  Recurrent iron deficiency with 2 doses of Injectafer on 8/26/2020 and 9/2/2020.    HISTORY OF PRESENT ILLNESS:  Shirin Washington is a 70 y.o. female who returns today for follow up of the above issue.    She is generally feeling well.  Stable chronic issues.  She is very concerned about her  who has been having significant urinary issues following radiation for prostate cancer.      REVIEW OF SYSTEMS:  PAIN:  See Vital Signs below.  GENERAL:  No fevers, chills, night sweats, or unintended weight loss.  Fatigue.    SKIN:  No rashes or non-healing lesions.  Mild alopecia  HEME/LYMPH:  No abnormal bleeding.  No palpable lymphadenopathy.  EYES:  No vision changes or diplopia.  ENT: History of vertigo but not worse at this point  RESPIRATORY:  No cough, shortness of breath, hemoptysis, or wheezing.  CARDIOVASCULAR:  No chest pain or shortness of breath  GASTROINTESTINAL:  Abdominal bloating and early satiety, chronic.   GENITOURINARY:  No dysuria or hematuria.  MUSCULOSKELETAL: Chronic back pain   NEUROLOGIC: Vertigo which she did not complain about today  PSYCHIATRIC: Some anxiety regarding her 's medical condition    Vitals:    05/11/21 1202   BP: 148/75   Pulse: 70   Resp: 16   Temp: 98 °F (36.7 °C)   SpO2: 99%   Weight: 56.3 kg (124 lb 1.6 oz)   Height: 152.4 cm (60\")   PainSc: 0-No pain       PHYSICAL EXAMINATION:  GENERAL:  Well-developed well-nourished female; awake, alert and oriented, in no acute distress.  Wearing a facemask.  Lymphatics: No cervical supraclavicular or axillary adenopathy  Chest: Lungs clear to auscultation bilaterally  Heart: No murmurs gallops or rubs.  Regular rate and rhythm.  Abdomen: Not examined today  Extremities: Warm and well perfused with no clubbing cyanosis " or edema      DIAGNOSTIC DATA:  Retic With IRF & RET-He (05/11/2021 11:53)  CBC & Differential (05/11/2021 11:53)    ASSESSMENT:  This is a 70 y.o. female with:  *History of iron deficiency anemia: She required intravenous Feraheme last in October 2015 and we gave 2 more doses in September and October 2017.  No obvious GI blood loss.  I suspect she has some malabsorption.  She has symptoms consistent with gastroparesis.  She also received vitamin B12 injections in the past and she received 2 injections with her Feraheme infusions.  At the end of 2018 her ferritin had dropped considerably.  Therefore, we proceeded with one dose of Injectafer.  · Ferritin was 23 with a hgb of 11.8 on 8/19/2020. She received two doses of IV Injectafer on 8/26 and 9/2/2020.  · Labs 10/28/2020 with hgb 13.3, ferritin 897, iron 109 with 28% iron saturation. B12 425.    · 5/11/2021: Hemoglobin 12.9, hematocrit 39.8%, MCV 88.1    *Fatigue: Improved on a thyroid supplement although persists    *Gastroparesis: Due to diabetes    *B12 deficiency: B12 level is drifting down a little bit.  B12 level 362 on 6/23/2020.  She continues an oral supplement. B12 last checked at 425.    *Chronic back pain: Stable    PLAN:  1. Follow-up in 6 months with a CBC reticulocyte count ferritin iron profile and vitamin B12 level.  I encouraged her to call us if she needs anything prior to that.

## 2021-05-11 ENCOUNTER — LAB (OUTPATIENT)
Dept: LAB | Facility: HOSPITAL | Age: 71
End: 2021-05-11

## 2021-05-11 ENCOUNTER — OFFICE VISIT (OUTPATIENT)
Dept: ONCOLOGY | Facility: CLINIC | Age: 71
End: 2021-05-11

## 2021-05-11 VITALS
TEMPERATURE: 98 F | DIASTOLIC BLOOD PRESSURE: 75 MMHG | HEIGHT: 60 IN | RESPIRATION RATE: 16 BRPM | HEART RATE: 70 BPM | BODY MASS INDEX: 24.36 KG/M2 | WEIGHT: 124.1 LBS | OXYGEN SATURATION: 99 % | SYSTOLIC BLOOD PRESSURE: 148 MMHG

## 2021-05-11 DIAGNOSIS — D50.9 IRON DEFICIENCY ANEMIA, UNSPECIFIED IRON DEFICIENCY ANEMIA TYPE: ICD-10-CM

## 2021-05-11 DIAGNOSIS — E53.8 B12 DEFICIENCY: Primary | ICD-10-CM

## 2021-05-11 DIAGNOSIS — IMO0002 UNCONTROLLED TYPE 2 DIABETES MELLITUS WITH COMPLICATION, WITH LONG-TERM CURRENT USE OF INSULIN: ICD-10-CM

## 2021-05-11 PROCEDURE — 99214 OFFICE O/P EST MOD 30 MIN: CPT | Performed by: INTERNAL MEDICINE

## 2021-05-11 PROCEDURE — 85025 COMPLETE CBC W/AUTO DIFF WBC: CPT

## 2021-05-11 PROCEDURE — 82728 ASSAY OF FERRITIN: CPT

## 2021-05-11 PROCEDURE — 83540 ASSAY OF IRON: CPT

## 2021-05-11 PROCEDURE — 84466 ASSAY OF TRANSFERRIN: CPT

## 2021-05-11 PROCEDURE — 80053 COMPREHEN METABOLIC PANEL: CPT

## 2021-05-11 PROCEDURE — 85046 RETICYTE/HGB CONCENTRATE: CPT

## 2021-05-11 PROCEDURE — 36415 COLL VENOUS BLD VENIPUNCTURE: CPT

## 2021-05-11 RX ORDER — INSULIN ASPART 100 [IU]/ML
INJECTION, SOLUTION INTRAVENOUS; SUBCUTANEOUS
Qty: 60 ML | Refills: 1 | Status: SHIPPED | OUTPATIENT
Start: 2021-05-11 | End: 2022-02-11 | Stop reason: SDUPTHER

## 2021-06-15 ENCOUNTER — INFUSION (OUTPATIENT)
Dept: ONCOLOGY | Facility: HOSPITAL | Age: 71
End: 2021-06-15

## 2021-06-15 DIAGNOSIS — M81.0 OSTEOPOROSIS, UNSPECIFIED OSTEOPOROSIS TYPE, UNSPECIFIED PATHOLOGICAL FRACTURE PRESENCE: ICD-10-CM

## 2021-06-15 DIAGNOSIS — M81.0 OSTEOPOROSIS, POST-MENOPAUSAL: Primary | ICD-10-CM

## 2021-06-15 PROCEDURE — 25010000002 DENOSUMAB 60 MG/ML SOLUTION PREFILLED SYRINGE: Performed by: INTERNAL MEDICINE

## 2021-06-15 PROCEDURE — 96372 THER/PROPH/DIAG INJ SC/IM: CPT

## 2021-06-15 RX ADMIN — DENOSUMAB 60 MG: 60 INJECTION SUBCUTANEOUS at 15:14

## 2021-06-15 NOTE — NURSING NOTE
Arrived  for prolia injection. Indication and side effects reviewed. Denies recent dental work. Labs and medications verified.Pt is taking Calcium and Vit D daily. Prolia administered in  arm without incidence. Instructed to call prescribing MD for any concerns or questions and instructed on how to schedule future appts.  Pt vu and discharged ambulatory.

## 2021-08-31 ENCOUNTER — TELEPHONE (OUTPATIENT)
Dept: ENDOCRINOLOGY | Age: 71
End: 2021-08-31

## 2021-08-31 NOTE — TELEPHONE ENCOUNTER
Patient called and stated that she needed the Dexcom g6 refilled at [facility] [phone]

## 2021-09-08 ENCOUNTER — PROCEDURE VISIT (OUTPATIENT)
Dept: OBSTETRICS AND GYNECOLOGY | Age: 71
End: 2021-09-08

## 2021-09-08 ENCOUNTER — APPOINTMENT (OUTPATIENT)
Dept: WOMENS IMAGING | Facility: HOSPITAL | Age: 71
End: 2021-09-08

## 2021-09-08 DIAGNOSIS — Z12.31 VISIT FOR SCREENING MAMMOGRAM: ICD-10-CM

## 2021-09-08 DIAGNOSIS — Z78.0 POST-MENOPAUSAL: Primary | ICD-10-CM

## 2021-09-08 PROCEDURE — 77067 SCR MAMMO BI INCL CAD: CPT | Performed by: RADIOLOGY

## 2021-09-08 PROCEDURE — 77067 SCR MAMMO BI INCL CAD: CPT | Performed by: OBSTETRICS & GYNECOLOGY

## 2021-09-08 PROCEDURE — 77063 BREAST TOMOSYNTHESIS BI: CPT | Performed by: OBSTETRICS & GYNECOLOGY

## 2021-09-08 PROCEDURE — 77063 BREAST TOMOSYNTHESIS BI: CPT | Performed by: RADIOLOGY

## 2021-09-08 PROCEDURE — 77080 DXA BONE DENSITY AXIAL: CPT | Performed by: OBSTETRICS & GYNECOLOGY

## 2021-10-11 ENCOUNTER — OFFICE VISIT (OUTPATIENT)
Dept: ENDOCRINOLOGY | Age: 71
End: 2021-10-11

## 2021-10-11 VITALS
HEIGHT: 60 IN | HEART RATE: 83 BPM | WEIGHT: 125.5 LBS | RESPIRATION RATE: 18 BRPM | DIASTOLIC BLOOD PRESSURE: 66 MMHG | OXYGEN SATURATION: 97 % | SYSTOLIC BLOOD PRESSURE: 138 MMHG | BODY MASS INDEX: 24.64 KG/M2

## 2021-10-11 DIAGNOSIS — E11.8 CONTROLLED TYPE 2 DIABETES MELLITUS WITH COMPLICATION, WITH LONG-TERM CURRENT USE OF INSULIN (HCC): Primary | ICD-10-CM

## 2021-10-11 DIAGNOSIS — M81.0 AGE RELATED OSTEOPOROSIS, UNSPECIFIED PATHOLOGICAL FRACTURE PRESENCE: ICD-10-CM

## 2021-10-11 DIAGNOSIS — Z79.4 CONTROLLED TYPE 2 DIABETES MELLITUS WITH COMPLICATION, WITH LONG-TERM CURRENT USE OF INSULIN (HCC): Primary | ICD-10-CM

## 2021-10-11 DIAGNOSIS — E03.9 HYPOTHYROIDISM, UNSPECIFIED TYPE: ICD-10-CM

## 2021-10-11 DIAGNOSIS — I10 ESSENTIAL HYPERTENSION: ICD-10-CM

## 2021-10-11 DIAGNOSIS — E78.5 HYPERLIPIDEMIA, UNSPECIFIED HYPERLIPIDEMIA TYPE: ICD-10-CM

## 2021-10-11 PROCEDURE — 99214 OFFICE O/P EST MOD 30 MIN: CPT | Performed by: NURSE PRACTITIONER

## 2021-10-11 RX ORDER — EZETIMIBE 10 MG/1
10 TABLET ORAL DAILY
COMMUNITY
Start: 2021-09-22

## 2021-10-11 NOTE — PROGRESS NOTES
Chief Complaint  Chief Complaint   Patient presents with   • Diabetes       Subjective          History of Present Illness    Shirin Washington 71 y.o. presents for a follow-up evaluation for type 2 DM    She has been diabetic over 25 years.    Pt is on the following medications for their DM: Tresiba 16 units HS and Novolog 2 units for every 10 carbs      Denies Fatigue, diarrhea or constipation, difficulty breathing,chest pain, palpitations, vision changes, numbness and tingling in feet/hands.    Weight stable    Pt does not have a history of DM retinopathy.  Last eye exam was 06/16/2021    Pt does not have a history of nephropathy.  Patient is not currently taking ACE/ARB    Pt does not have neuropathy.    Pt does not have a history of CAD or CVA.    Last A1C in 02/21 was 6.60     Last microalbumin in 05/20 was normal      Blood Sugars    Blood glucoses are checked via Dexcom.    Problem with downloading Dexcom today - patient to download and send via my chart    Pt has a couple episodes of hypoglycemia over the past month.    Patient tries to follow a DM diet for the most part.        Hypothyroid    Current treatment is levothyroxine 50 mcg daily    Last labs in 02/21 showed TSH 0.982, FT4 1.68 and FT3 2.7    PCP treats        Hyperlipidemia     Pt denies any muscle/body aches, chest pain, or shortness of breath    Pt is currently taking atorvastation 80 mg HS and fenofibrate 145 mg daily and zetia 10 daily    Last lipid panel in 02/21 showed Total 155, Triglycerides 115, HDL 47 and LDL 87          Hypertension    Pt denies any chest pain, palpitations, shortness of breath, or headache    Current regimen includes atenolol 50 mg daily and norvasc 2.5 mg daily        Osteoporosis      Denies any recent fracture, bone/joint pain, muscle pain, chest pain or shortness of breath    Current treatment includes Prolia every 6 months from Dr. Toma Ricketts (GYN)    Last Ca was 9.5 in 05/21    Last Vitamin D was 34.4 in  "02/21    Last DEXA scan was in 09/21 and showed osteopenia          I have reviewed the patient's allergies, medicines, past medical hx, family hx and social hx.    Objective   Vital Signs:   /66   Pulse 83   Resp 18   Ht 152.4 cm (60\")   Wt 56.9 kg (125 lb 8 oz)   SpO2 97%   BMI 24.51 kg/m²       Physical Exam   Physical Exam  Constitutional:       General: She is not in acute distress.     Appearance: Normal appearance. She is not diaphoretic.   HENT:      Head: Normocephalic and atraumatic.   Eyes:      General:         Right eye: No discharge.         Left eye: No discharge.   Cardiovascular:      Rate and Rhythm: Normal rate and regular rhythm.      Heart sounds: Normal heart sounds. No murmur heard.  No friction rub. No gallop.    Pulmonary:      Effort: Pulmonary effort is normal. No respiratory distress.      Breath sounds: Normal breath sounds. No wheezing.   Skin:     General: Skin is warm and dry.   Neurological:      Mental Status: She is alert.   Psychiatric:         Mood and Affect: Mood normal.         Behavior: Behavior normal.                    Results Review:   Hemoglobin A1C   Date Value Ref Range Status   02/04/2021 6.60 (H) 4.80 - 5.60 % Final     Comment:     Hemoglobin A1C Ranges:  Increased Risk for Diabetes  5.7% to 6.4%  Diabetes                     >= 6.5%  Diabetic Goal                < 7.0%       Triglycerides   Date Value Ref Range Status   02/04/2021 115 0 - 150 mg/dL Final     HDL Cholesterol   Date Value Ref Range Status   02/04/2021 47 40 - 60 mg/dL Final     LDL Chol Calc (NIH)   Date Value Ref Range Status   02/04/2021 87 0 - 100 mg/dL Final     VLDL Cholesterol Harshad   Date Value Ref Range Status   02/04/2021 21 5 - 40 mg/dL Final         Assessment and Plan {CC Problem List  Visit Diagnosis  ROS  Review (Popup)  Health Maintenance  Quality  BestPractice  Medications  SmartSets  SnapShot Encounters  Media :23  Diagnoses and all orders for this visit:    1. " Controlled type 2 diabetes mellitus with complication, with long-term current use of insulin (HCC) (Primary)  -     Insulin Degludec (TRESIBA FLEXTOUCH) 200 UNIT/ML solution pen-injector pen injection; Inject 16 Units under the skin into the appropriate area as directed every night at bedtime. 16 units SQ at bedtime  Dispense: 2 pen; Refill: 4  -     Hemoglobin A1c  -     Microalbumin / Creatinine Urine Ratio - Urine, Clean Catch    Lows after breakfast which is a small meal- adjust carb ratio to 2 units for every 12 carbs to see if that helps  Continue with Tresiba 16 units HS and Novolog 2 units for every 10 carbs for lunch and dinner  Check labs  Patient to send dexcom code through my chart for us to download readings      2. Hypothyroidism, unspecified type    PCP treats  Stable  Monitor      3. Essential hypertension    Stable  Continue with current medication regimen  Monitor      4. Hyperlipidemia, unspecified hyperlipidemia type    Continue with statin, zetia, and fenofibrate  Monitor      5. Age related osteoporosis, unspecified pathological fracture presence    On Prolia  GYN is managing treatment  Up to date on dexa scan           Refills sent to pharmacy      RTC in 4 months      Follow Up     Patient was given instructions and counseling regarding her condition or for health maintenance advice. Please see specific information pulled into the AVS if appropriate.              Graciela Amador, SHERI  10/11/21

## 2021-10-12 LAB
ALBUMIN/CREAT UR: 9 MG/G CREAT (ref 0–29)
CREAT UR-MCNC: 148.2 MG/DL
HBA1C MFR BLD: 6.8 % (ref 4.8–5.6)
MICROALBUMIN UR-MCNC: 12.6 UG/ML

## 2021-10-13 NOTE — PROGRESS NOTES
Called and talked to patient-    A1C 6.8 which is at goal.    Urine showed no protein - we will continue to monitor

## 2021-11-15 NOTE — PROGRESS NOTES
"[facility] GROUP OUTPATIENT FOLLOW UP CLINIC VISIT    REASON FOR FOLLOW-UP:    1.  Iron deficiency anemia.  Her last intravenous Feraheme infusions or in October 2015.  Injectafer administered on 1/4/2019.  Recurrent iron deficiency with 2 doses of Injectafer on 8/26/2020 and 9/2/2020.    HISTORY OF PRESENT ILLNESS:  Shirin Washington is a 71 y.o. female who returns today for follow up of the above issue.    She continues to have intermittent vertigo and gastrointestinal issues related to gastroparesis.  She has been taking care of her  who has been having severe issues following his prostate radiation.  Thankfully, he has made some improvement.      REVIEW OF SYSTEMS:  See the HPI    Vitals:    11/16/21 1030   BP: 148/73   Pulse: 78   Resp: 16   Temp: 98 °F (36.7 °C)   TempSrc: Temporal   SpO2: 98%   Weight: 56.5 kg (124 lb 9.6 oz)   Height: 152.4 cm (60\")   PainSc: 0-No pain  Comment: b12 deficiency       PHYSICAL EXAMINATION:  GENERAL:  Well-developed well-nourished female; awake, alert and oriented, in no acute distress.  Wearing a facemask.  Lymphatics: No cervical supraclavicular or axillary adenopathy  Chest: Lungs clear to auscultation bilaterally  Heart: No murmurs gallops or rubs.  Regular rate and rhythm.  Abdomen: Soft, nontender, nondistended, normal active bowel sounds  Extremities: Warm and well perfused with no clubbing cyanosis or edema      DIAGNOSTIC DATA:  CBC & Differential (11/16/2021 10:10)  Retic With IRF & RET-He (11/16/2021 10:10)        ASSESSMENT:  This is a 71 y.o. female with:    *History of iron deficiency anemia: She required intravenous Feraheme last in October 2015 and we gave 2 more doses in September and October 2017.  No obvious GI blood loss.  I suspect she has some malabsorption.  She has symptoms consistent with gastroparesis.  She also received vitamin B12 injections in the past and she received 2 injections with her Feraheme infusions.  At the end of 2018 her " ferritin had dropped considerably.  Therefore, we proceeded with one dose of Injectafer.  · Ferritin was 23 with a hgb of 11.8 on 8/19/2020. She received two doses of IV Injectafer on 8/26 and 9/2/2020.  · Labs 10/28/2020 with hgb 13.3, ferritin 897, iron 109 with 28% iron saturation. B12 425.    · 5/11/2021: Hemoglobin 12.9, hematocrit 39.8%, MCV 88.1  · 11/16/2021: Hemoglobin normal at 13.5, normal MCV at 86.    *Fatigue: Improved on a thyroid supplement although persists    *Gastroparesis: Due to diabetes.  Stable symptoms.    *B12 deficiency: B12 level was previously drifting down a little bit.  B12 level 362 on 6/23/2020.  She continues an oral supplement. B12 last checked at 425.  Vitamin B12 level pending today.    *Chronic back pain: Stable    PLAN:  1. Follow-up pending labs today  2. Assuming everything is stable, follow-up in six months with a CBC reticulocyte count ferritin iron profile and vitamin B12 level.  3. I encouraged her to call us with any needs prior to that

## 2021-11-16 ENCOUNTER — LAB (OUTPATIENT)
Dept: LAB | Facility: HOSPITAL | Age: 71
End: 2021-11-16

## 2021-11-16 ENCOUNTER — DOCUMENTATION (OUTPATIENT)
Dept: ONCOLOGY | Facility: CLINIC | Age: 71
End: 2021-11-16

## 2021-11-16 ENCOUNTER — OFFICE VISIT (OUTPATIENT)
Dept: ONCOLOGY | Facility: CLINIC | Age: 71
End: 2021-11-16

## 2021-11-16 VITALS
RESPIRATION RATE: 16 BRPM | TEMPERATURE: 98 F | OXYGEN SATURATION: 98 % | HEIGHT: 60 IN | SYSTOLIC BLOOD PRESSURE: 148 MMHG | WEIGHT: 124.6 LBS | DIASTOLIC BLOOD PRESSURE: 73 MMHG | BODY MASS INDEX: 24.46 KG/M2 | HEART RATE: 78 BPM

## 2021-11-16 DIAGNOSIS — E53.8 B12 DEFICIENCY: ICD-10-CM

## 2021-11-16 DIAGNOSIS — D50.9 IRON DEFICIENCY ANEMIA, UNSPECIFIED IRON DEFICIENCY ANEMIA TYPE: ICD-10-CM

## 2021-11-16 DIAGNOSIS — D50.9 IRON DEFICIENCY ANEMIA, UNSPECIFIED IRON DEFICIENCY ANEMIA TYPE: Primary | ICD-10-CM

## 2021-11-16 LAB
BASOPHILS # BLD AUTO: 0.07 10*3/MM3 (ref 0–0.2)
BASOPHILS NFR BLD AUTO: 0.9 % (ref 0–1.5)
DEPRECATED RDW RBC AUTO: 42 FL (ref 37–54)
EOSINOPHIL # BLD AUTO: 0.57 10*3/MM3 (ref 0–0.4)
EOSINOPHIL NFR BLD AUTO: 7.5 % (ref 0.3–6.2)
ERYTHROCYTE [DISTWIDTH] IN BLOOD BY AUTOMATED COUNT: 13.3 % (ref 12.3–15.4)
FERRITIN SERPL-MCNC: 471.6 NG/ML (ref 13–150)
HCT VFR BLD AUTO: 41.2 % (ref 34–46.6)
HGB BLD-MCNC: 13.5 G/DL (ref 12–15.9)
HGB RETIC QN AUTO: 32.8 PG (ref 29.8–36.1)
IMM GRANULOCYTES # BLD AUTO: 0.05 10*3/MM3 (ref 0–0.05)
IMM GRANULOCYTES NFR BLD AUTO: 0.7 % (ref 0–0.5)
IMM RETICS NFR: 13.8 % (ref 3–15.8)
IRON 24H UR-MRATE: 100 MCG/DL (ref 37–145)
IRON SATN MFR SERPL: 22 % (ref 14–48)
LYMPHOCYTES # BLD AUTO: 1.5 10*3/MM3 (ref 0.7–3.1)
LYMPHOCYTES NFR BLD AUTO: 19.6 % (ref 19.6–45.3)
MCH RBC QN AUTO: 28.2 PG (ref 26.6–33)
MCHC RBC AUTO-ENTMCNC: 32.8 G/DL (ref 31.5–35.7)
MCV RBC AUTO: 86 FL (ref 79–97)
MONOCYTES # BLD AUTO: 0.55 10*3/MM3 (ref 0.1–0.9)
MONOCYTES NFR BLD AUTO: 7.2 % (ref 5–12)
NEUTROPHILS NFR BLD AUTO: 4.91 10*3/MM3 (ref 1.7–7)
NEUTROPHILS NFR BLD AUTO: 64.1 % (ref 42.7–76)
NRBC BLD AUTO-RTO: 0 /100 WBC (ref 0–0.2)
PLATELET # BLD AUTO: 275 10*3/MM3 (ref 140–450)
PMV BLD AUTO: 10.7 FL (ref 6–12)
RBC # BLD AUTO: 4.79 10*6/MM3 (ref 3.77–5.28)
RETICS # AUTO: 0.1 10*6/MM3 (ref 0.02–0.13)
RETICS/RBC NFR AUTO: 2.15 % (ref 0.7–1.9)
TIBC SERPL-MCNC: 451 MCG/DL (ref 249–505)
TRANSFERRIN SERPL-MCNC: 322 MG/DL (ref 200–360)
VIT B12 BLD-MCNC: 383 PG/ML (ref 211–946)
WBC # BLD AUTO: 7.65 10*3/MM3 (ref 3.4–10.8)

## 2021-11-16 PROCEDURE — 82728 ASSAY OF FERRITIN: CPT

## 2021-11-16 PROCEDURE — 99214 OFFICE O/P EST MOD 30 MIN: CPT | Performed by: INTERNAL MEDICINE

## 2021-11-16 PROCEDURE — 82607 VITAMIN B-12: CPT | Performed by: INTERNAL MEDICINE

## 2021-11-16 PROCEDURE — 85046 RETICYTE/HGB CONCENTRATE: CPT

## 2021-11-16 PROCEDURE — 84466 ASSAY OF TRANSFERRIN: CPT

## 2021-11-16 PROCEDURE — 36415 COLL VENOUS BLD VENIPUNCTURE: CPT

## 2021-11-16 PROCEDURE — 85025 COMPLETE CBC W/AUTO DIFF WBC: CPT

## 2021-11-16 PROCEDURE — 83540 ASSAY OF IRON: CPT

## 2021-11-16 NOTE — PROGRESS NOTES
Oncology Social Work    OSW aided pt in completing her Living Will. Pt had already completed the majority of the document, and felt confident in her choices.  Pt selected her , Yehuda mAador, as her primary health care surrogate, and her son, Yehuda Washington, as her secondary surrogate.  Pt also selected the following:   -Direct that treatment be withheld or withdrawn,. And that I be permitted to die naturally with only the administration of medication or the performance of any medical treatment deemed necessary to alleviate pain.   - Authorize rhe withholding or withdrawal of artificially provided food, water, or other artificially provided nourishment or fluids.  - Authorize the giving of all or any needed organs, tissues, and eyes/corneas upon death for any purpose specified.     OSW provided pt with original and photocopies of her document, and submitted a copy to medical records.    OSW encouraged pt to reach out with any additional needs that may arise.    Catherine Worthy, W  Oncology Social Worker

## 2021-12-15 ENCOUNTER — TELEPHONE (OUTPATIENT)
Dept: ONCOLOGY | Facility: OTHER | Age: 71
End: 2021-12-15

## 2021-12-15 DIAGNOSIS — M81.0 AGE-RELATED OSTEOPOROSIS WITHOUT CURRENT PATHOLOGICAL FRACTURE: Primary | ICD-10-CM

## 2021-12-15 NOTE — TELEPHONE ENCOUNTER
PT CALLED, SAID SHE JUST RECVD A VM STATING SHE HAD LABS TOMORROW. LAB APPT SHOWS FRI 12/17 STILL ON HER CHART. W/T PT TO OFFICE TO FURTHER ASSIST.

## 2021-12-16 ENCOUNTER — TELEPHONE (OUTPATIENT)
Dept: ONCOLOGY | Facility: HOSPITAL | Age: 71
End: 2021-12-16

## 2021-12-16 NOTE — TELEPHONE ENCOUNTER
Pt had labs done at Dr. Domingo's office 12/8/21 with calcium level of 10.1.  Requested copy to be faxed.  No additional labs needs.  Ok to proceed with prolia.

## 2021-12-17 ENCOUNTER — INFUSION (OUTPATIENT)
Dept: ONCOLOGY | Facility: HOSPITAL | Age: 71
End: 2021-12-17

## 2021-12-17 DIAGNOSIS — M81.0 OSTEOPOROSIS, POST-MENOPAUSAL: Primary | ICD-10-CM

## 2021-12-17 DIAGNOSIS — M81.0 OSTEOPOROSIS, UNSPECIFIED OSTEOPOROSIS TYPE, UNSPECIFIED PATHOLOGICAL FRACTURE PRESENCE: ICD-10-CM

## 2021-12-17 PROCEDURE — 96372 THER/PROPH/DIAG INJ SC/IM: CPT

## 2021-12-17 PROCEDURE — 25010000002 DENOSUMAB 60 MG/ML SOLUTION PREFILLED SYRINGE: Performed by: INTERNAL MEDICINE

## 2021-12-17 RX ADMIN — DENOSUMAB 60 MG: 60 INJECTION SUBCUTANEOUS at 14:12

## 2022-02-11 ENCOUNTER — OFFICE VISIT (OUTPATIENT)
Dept: ENDOCRINOLOGY | Age: 72
End: 2022-02-11

## 2022-02-11 VITALS
BODY MASS INDEX: 23.8 KG/M2 | HEART RATE: 76 BPM | DIASTOLIC BLOOD PRESSURE: 72 MMHG | WEIGHT: 121.2 LBS | HEIGHT: 60 IN | OXYGEN SATURATION: 99 % | SYSTOLIC BLOOD PRESSURE: 139 MMHG

## 2022-02-11 DIAGNOSIS — M81.0 AGE RELATED OSTEOPOROSIS, UNSPECIFIED PATHOLOGICAL FRACTURE PRESENCE: ICD-10-CM

## 2022-02-11 DIAGNOSIS — E11.8 CONTROLLED TYPE 2 DIABETES MELLITUS WITH COMPLICATION, WITH LONG-TERM CURRENT USE OF INSULIN: Primary | ICD-10-CM

## 2022-02-11 DIAGNOSIS — E03.9 HYPOTHYROIDISM, UNSPECIFIED TYPE: ICD-10-CM

## 2022-02-11 DIAGNOSIS — Z79.4 CONTROLLED TYPE 2 DIABETES MELLITUS WITH COMPLICATION, WITH LONG-TERM CURRENT USE OF INSULIN: Primary | ICD-10-CM

## 2022-02-11 DIAGNOSIS — E78.5 HYPERLIPIDEMIA, UNSPECIFIED HYPERLIPIDEMIA TYPE: ICD-10-CM

## 2022-02-11 DIAGNOSIS — I10 ESSENTIAL HYPERTENSION: ICD-10-CM

## 2022-02-11 PROCEDURE — 99214 OFFICE O/P EST MOD 30 MIN: CPT | Performed by: NURSE PRACTITIONER

## 2022-02-11 RX ORDER — METOCLOPRAMIDE 10 MG/1
10 TABLET ORAL
COMMUNITY
End: 2022-07-14

## 2022-02-11 RX ORDER — INSULIN ASPART 100 [IU]/ML
INJECTION, SOLUTION INTRAVENOUS; SUBCUTANEOUS
Qty: 60 ML | Refills: 1 | Status: SHIPPED | OUTPATIENT
Start: 2022-02-11 | End: 2022-02-21 | Stop reason: SDUPTHER

## 2022-02-11 NOTE — PROGRESS NOTES
"Chief Complaint  Chief Complaint   Patient presents with   • Diabetes       Subjective          History of Present Illness    Shirin Washington 71 y.o. presents for a follow-up evaluation for type 2 DM     She has been diabetic over 25 years.     Pt is on the following medications for their DM: Tresiba 14 units HS and Novolog 2 meals carb ratio to 2 units for every 24 carbs    Only taking Novolog when BG are higher for meals      Pt complains of diarrhea. Patient is seeing PCP for \"stomach issue\", bloating and feeling full all the time.  She is eating less and     Denies constipation, difficulty breathing,chest pain, shortness of breath, vision changes, numbness and tingling in feet/hands.    Weight loss of 4 lbs since last visit    Pt does not have a history of DM retinopathy.  Last eye exam was 06/16/2021     Pt does not have a history of nephropathy.  Patient is not currently taking ACE/ARB     Pt does not have neuropathy.     Pt does not have a history of CAD or CVA.    Last A1C in 12/9/21 was 6.1    Last microalbumin in 10/21 was negative      Blood Sugars    Blood glucoses are checked via dexcom. - she doesn't remember code to get into her linn to generate code for download    Pt has several episodes of hypoglycemia - she has been changing diet which has caused her to go low a couple hours after eating.            Hypothyroid    PCP is managing - she is going to ask about checking labs to her PCP    Current treatment is levothyroxine 50 mcg daily    Last labs in 02/21 showed TSH 0.982 and FT4 1.68            Hyperlipidemia     Pt denies any muscle/body aches, chest pain, or shortness of breath    Pt is currently taking atorvastation 80 mg HS and fenofibrate 145 mg daily and zetia 10 daily    Last lipid panel in 12/09/21 showed Total 143, Triglycerides 93, HDL 56 and LDL 69          Hypertension    Pt denies any chest pain, palpitations, shortness of breath, or headache    Current regimen includes atenolol 50 mg " "daily and norvasc 2.5 mg daily          Osteoporosis        Denies any recent fracture, bone/joint pain, muscle pain, chest pain or shortness of breath     Current treatment includes Prolia every 6 months from Dr. Toma Ricketts (GYN)     Last Ca was 9.5 in 05/21     Last Vitamin D was 34.4 in 02/21     Last DEXA scan was in 09/21 and showed osteopenia        I have reviewed the patient's allergies, medicines, past medical hx, family hx and social hx.    Objective   Vital Signs:   /72   Pulse 76   Ht 152.4 cm (60\")   Wt 55 kg (121 lb 3.2 oz)   SpO2 99%   BMI 23.67 kg/m²       Physical Exam   Physical Exam  Constitutional:       General: She is not in acute distress.     Appearance: Normal appearance. She is not diaphoretic.   HENT:      Head: Normocephalic and atraumatic.   Eyes:      General:         Right eye: No discharge.         Left eye: No discharge.   Cardiovascular:      Rate and Rhythm: Normal rate and regular rhythm.      Heart sounds: Normal heart sounds. No murmur heard.  No friction rub. No gallop.    Pulmonary:      Effort: Pulmonary effort is normal. No respiratory distress.      Breath sounds: Normal breath sounds. No wheezing.   Skin:     General: Skin is warm and dry.   Neurological:      Mental Status: She is alert.   Psychiatric:         Mood and Affect: Mood normal.         Behavior: Behavior normal.                    Results Review:   Hemoglobin A1C   Date Value Ref Range Status   10/11/2021 6.80 (H) 4.80 - 5.60 % Final     Comment:     Hemoglobin A1C Ranges:  Increased Risk for Diabetes  5.7% to 6.4%  Diabetes                     >= 6.5%  Diabetic Goal                < 7.0%       Triglycerides   Date Value Ref Range Status   02/04/2021 115 0 - 150 mg/dL Final     HDL Cholesterol   Date Value Ref Range Status   02/04/2021 47 40 - 60 mg/dL Final     LDL Chol Calc (NIH)   Date Value Ref Range Status   02/04/2021 87 0 - 100 mg/dL Final     VLDL Cholesterol Harshad   Date Value Ref Range " Status   02/04/2021 21 5 - 40 mg/dL Final         Assessment and Plan {CC Problem List  Visit Diagnosis  ROS  Review (Popup)  Health Maintenance  Quality  BestPractice  Medications  SmartSets  SnapShot Encounters  Media :23  Diagnoses and all orders for this visit:    1. Controlled type 2 diabetes mellitus with complication, with long-term current use of insulin (HCC) (Primary)  -     insulin aspart (NovoLOG FlexPen) 100 UNIT/ML solution pen-injector sc pen; 15 UNITS SQ TID WITH MEALS  Dispense: 60 mL; Refill: 1    A1C in Dec 6.1 with some lows, but she is adjusting insulin for this  Continue with Tresiba 14 units HS  Continue Novolog 2 meals carb ratio to 2 units for every 24 carbs  Continue with Dexcom      2. Hypothyroidism, unspecified type    PCP managing  She is going to ask about check labs  Defer management to PCP    3. Hyperlipidemia, unspecified hyperlipidemia type    Dec labs are good  Continue with statin    4. Essential hypertension    Stable  Continue with current medication regimen  Defer management to PCP    5. Age related osteoporosis, unspecified pathological fracture presence    GYN is giving Proila  Defer treatment to GYN        Refills sent to pharmacy      RTC in 4 months with me and 8 months with Dr. Swann      Follow Up     Patient was given instructions and counseling regarding her condition or for health maintenance advice. Please see specific information pulled into the AVS if appropriate.              Graciela Amador, SHERI  02/11/22

## 2022-02-21 DIAGNOSIS — E11.8 CONTROLLED TYPE 2 DIABETES MELLITUS WITH COMPLICATION, WITH LONG-TERM CURRENT USE OF INSULIN: ICD-10-CM

## 2022-02-21 DIAGNOSIS — Z79.4 CONTROLLED TYPE 2 DIABETES MELLITUS WITH COMPLICATION, WITH LONG-TERM CURRENT USE OF INSULIN: ICD-10-CM

## 2022-02-21 RX ORDER — INSULIN ASPART 100 [IU]/ML
INJECTION, SOLUTION INTRAVENOUS; SUBCUTANEOUS
Qty: 60 ML | Refills: 1 | Status: SHIPPED | OUTPATIENT
Start: 2022-02-21

## 2022-04-07 NOTE — PROGRESS NOTES
[facility] GROUP OUTPATIENT FOLLOW UP CLINIC VISIT    REASON FOR FOLLOW-UP:    1.  Iron deficiency anemia.  Her last intravenous Feraheme infusions or in October 2015.    HISTORY OF PRESENT ILLNESS:  Shirin Washington is a 68 y.o. female who returns today for follow up of the above issue.    Overall she is doing better with less vertigo and less back pain.  She is playing golf 1-2 days per week.  She continues to have some fatigue and is taking frequent naps.  She continues to have some abdominal bloating with certain foods.  Blood glucose levels are well controlled and she has had some hypoglycemia.  She is on thyroid supplement and has more energy after starting this.    PAST MEDICAL, SURGICAL, FAMILY, AND SOCIAL HISTORIES were reviewed with the patient and in the electronic medical record, and were updated if indicated.    ALLERGIES:  Allergies   Allergen Reactions   • Topamax [Topiramate]        MEDICATIONS:  The medication list has been reviewed with the patient by the medical assistant, and the list has been updated in the electronic medical record, which I reviewed.  Medication dosages and frequencies were confirmed to be accurate.    REVIEW OF SYSTEMS:  PAIN:  See Vital Signs below.  GENERAL:  No fevers, chills, night sweats, or unintended weight loss.  Fatigue.    SKIN:  No rashes or non-healing lesions.  HEME/LYMPH:  No abnormal bleeding.  No palpable lymphadenopathy.  EYES:  No vision changes or diplopia.  ENT:  Vertigo which has improved  RESPIRATORY:  No cough, shortness of breath, hemoptysis, or wheezing.  CARDIOVASCULAR:  No chest pain or shortness of breath  GASTROINTESTINAL:  Abdominal bloating with certain foods  GENITOURINARY:  No dysuria or hematuria.  MUSCULOSKELETAL: Chronic back pain which has improved  NEUROLOGIC:  No dizziness, loss of consciousness, or seizures.  PSYCHIATRIC:  No depression, anxiety, or mood changes.    Vitals:    09/10/18 0848   BP: 128/70   Pulse: 71   Resp: 12  Patient family members instructed on how to care for patient drains. Patient family verbalized understanding. "  Temp: 98.4 °F (36.9 °C)   TempSrc: Oral   SpO2: 99%   Weight: 58.8 kg (129 lb 9.6 oz)   Height: 152.1 cm (59.88\")   PainSc: 0-No pain       PHYSICAL EXAMINATION:  GENERAL:  Well-developed well-nourished female; awake, alert and oriented, in no acute distress.  SKIN:  Warm and dry, without rashes, purpura, or petechiae.  HEAD:  Normocephalic, atraumatic.  EYES:  Pupils equal, round and reactive to light.  Extraocular movements intact.  Conjunctivae normal.  EARS:  Hearing intact.  NOSE:  Septum midline.  No excoriations or nasal discharge.  MOUTH:  No stomatitis or ulcers.  Lips are normal.  THROAT:  Oropharynx without lesions or exudates.  NECK:  Supple with good range of motion; no thyromegaly or masses; no JVD or bruits.  LYMPHATICS:  No cervical, supraclavicular, axillary, or inguinal lymphadenopathy.  CHEST:  Lungs are clear to auscultation bilaterally.  No wheezes, rales, or rhonchi.  HEART:  Regular rate; normal rhythm.  No murmurs, gallops or rubs.  ABDOMEN:  Soft, nontender, normal active bowel sounds, nondistended  EXTREMITIES:  No clubbing, cyanosis, or edema.  NEUROLOGICAL:  No focal neurologic deficits.    DIAGNOSTIC DATA:  Results for orders placed or performed in visit on 08/31/18   Comprehensive Metabolic Panel   Result Value Ref Range    Glucose 156 (H) 74 - 124 mg/dL    BUN 29 (H) 6 - 20 mg/dL    Creatinine 1.05 0.60 - 1.10 mg/dL    Sodium 139 134 - 145 mmol/L    Potassium 4.5 3.5 - 4.7 mmol/L    Chloride 103 98 - 107 mmol/L    CO2 27.0 22.0 - 29.0 mmol/L    Calcium 9.5 8.5 - 10.2 mg/dL    Total Protein 6.6 6.3 - 8.0 g/dL    Albumin 4.10 3.50 - 5.20 g/dL    ALT (SGPT) 15 0 - 33 U/L    AST (SGOT) 16 0 - 32 U/L    Alkaline Phosphatase 32 (L) 38 - 116 U/L    Total Bilirubin 0.4 0.1 - 1.2 mg/dL    eGFR Non African Amer 52 (L) >60 mL/min/1.73    Globulin 2.5 1.8 - 3.5 gm/dL    A/G Ratio 1.6 1.1 - 2.4 g/dL    BUN/Creatinine Ratio 27.6 7.3 - 30.0    Anion Gap 9.0 mmol/L   Ferritin   Result Value Ref " Range    Ferritin 75.90 ng/mL   Iron Profile   Result Value Ref Range    Iron 62 37 - 145 mcg/dL    Iron Saturation 15 14 - 48 %    Transferrin 305 200 - 360 mg/dL    TIBC 427 249 - 505 mcg/dL   Retic With IRF & RET-He   Result Value Ref Range    Immature Reticulocyte Fraction 12.4 3.0 - 15.8 %    Reticulocyte % 1.84 0.60 - 2.00 %    Reticulocyte Hgb 31.4 29.8 - 36.1 pg   Vitamin B12   Result Value Ref Range    Vitamin B-12 383 211 - 946 pg/mL   CBC Auto Differential   Result Value Ref Range    WBC 6.99 4.00 - 10.00 10*3/mm3    RBC 4.34 3.90 - 5.00 10*6/mm3    Hemoglobin 12.1 11.5 - 14.9 g/dL    Hematocrit 37.6 34.0 - 45.0 %    MCV 86.6 83.0 - 97.0 fL    MCH 27.9 27.0 - 33.0 pg    MCHC 32.2 32.0 - 35.0 g/dL    RDW 14.0 11.7 - 14.5 %    RDW-SD 44.5 37.0 - 49.0 fl    MPV 11.0 8.9 - 12.1 fL    Platelets 234 150 - 375 10*3/mm3    Neutrophil % 66.0 39.0 - 75.0 %    Lymphocyte % 17.2 (L) 20.0 - 49.0 %    Monocyte % 6.7 4.0 - 12.0 %    Eosinophil % 9.0 (H) 1.0 - 5.0 %    Basophil % 0.7 0.0 - 1.1 %    Immature Grans % 0.4 0.0 - 0.5 %    Neutrophils, Absolute 4.61 1.50 - 7.00 10*3/mm3    Lymphocytes, Absolute 1.20 1.00 - 3.50 10*3/mm3    Monocytes, Absolute 0.47 0.25 - 0.80 10*3/mm3    Eosinophils, Absolute 0.63 (H) 0.00 - 0.36 10*3/mm3    Basophils, Absolute 0.05 0.00 - 0.10 10*3/mm3    Immature Grans, Absolute 0.03 0.00 - 0.03 10*3/mm3    nRBC 0.0 0.0 - 0.0 /100 WBC         ASSESSMENT:  This is a 68 y.o. female with:  1.  History of iron deficiency anemia: She required intravenous Feraheme last in October 2015 and we gave 2 more doses in September and October 2017.  No obvious GI blood loss.  I suspect she has some malabsorption.  She has symptoms consistent with gastroparesis.  She also received vitamin B12 injections in the past and she received 2 injections with her Feraheme infusions.  She is taking oral vitamin B12 about 3 days per week.  Her labs look great at this point with no evidence for iron or vitamin B12  deficiency.  Her ferritin has dropped some.  We will continue to monitor and I will see her back in 4 months for close follow-up.  2.  Fatigue: Improved on a thyroid supplement  3.  Gastroparesis      PLAN:  1.  I will see her back in 4 months for follow-up with labs in 1 week prior.  2.  She will continue her oral vitamin B12 supplement several days per week.

## 2022-05-16 NOTE — PROGRESS NOTES
"[facility] OUTPATIENT FOLLOW UP CLINIC VISIT    REASON FOR FOLLOW-UP:    1.  Iron deficiency anemia.  Her last intravenous Feraheme infusions or in October 2015.  Injectafer administered on 1/4/2019.  Recurrent iron deficiency with 2 doses of Injectafer on 8/26/2020 and 9/2/2020.    HISTORY OF PRESENT ILLNESS:  Shirin Washington is a 71 y.o. female who returns today for follow up of the above issue.    Chronic gastrointestinal issues with early satiety and postprandial abdominal discomfort persist.    She does note worsening dyspnea on exertion for the past several months.  She is concerned about worsening anemia.  She denies any bleeding.  She remains reasonably active.  She is now only playing 9 holes of golf at a time because of her fatigue and dyspnea on exertion.      REVIEW OF SYSTEMS:  See the HPI    Vitals:    05/17/22 1007   BP: 163/73   Pulse: 67   Resp: 16   Temp: 97.7 °F (36.5 °C)   TempSrc: Temporal   SpO2: 98%   Weight: 53.8 kg (118 lb 11.2 oz)   Height: 152.4 cm (60\")   PainSc: 0-No pain  Comment: anemia       PHYSICAL EXAMINATION:  GENERAL:  Well-developed well-nourished female; awake, alert and oriented, in no acute distress.  Wearing a facemask.  Lymphatics: No cervical supraclavicular or axillary adenopathy palpable today  Chest: Lungs clear to auscultation bilaterally with excellent air movement  Heart: No murmurs gallops or rubs.  Regular rate and rhythm.  Abdomen: Not examined today  Extremities: Warm and well perfused with no clubbing cyanosis or edema    DIAGNOSTIC DATA:  CBC & Differential (05/17/2022 10:03)  Retic With IRF & RET-He (05/17/2022 10:03)    ASSESSMENT:  This is a 71 y.o. female with:    *History of iron deficiency anemia: She required intravenous Feraheme last in October 2015 and we gave 2 more doses in September and October 2017.  No obvious GI blood loss.  I suspect she has some malabsorption.  She has symptoms consistent with gastroparesis.  She also received " vitamin B12 injections in the past and she received 2 injections with her Feraheme infusions.  At the end of 2018 her ferritin had dropped considerably.  Therefore, we proceeded with one dose of Injectafer.  · Ferritin was 23 with a hgb of 11.8 on 8/19/2020. She received two doses of IV Injectafer on 8/26 and 9/2/2020.  · Labs 10/28/2020 with hgb 13.3, ferritin 897, iron 109 with 28% iron saturation. B12 425.    · 5/11/2021: Hemoglobin 12.9, hematocrit 39.8%, MCV 88.1  · 11/16/2021: Hemoglobin normal at 13.5, normal MCV at 86.  · 5/17/2022: Hemoglobin remains normal at 13.1 with a normal MCV at 88.5.  Iron studies and ferritin pending.    *Dyspnea on exertion  · Unclear etiology.  She will follow-up with Dr. Domingo regarding this.  She is not anemic.    *Fatigue: Improved on a thyroid supplement although persists    *Gastroparesis: Due to diabetes.  Stable but persistent symptoms.    *B12 deficiency: B12 level was previously drifting down a little bit.  B12 level 362 on 6/23/2020.  She continues an oral supplement. B12 last checked at 425.  Vitamin B12 level remains pending today.    *Chronic back pain: Stable    PLAN:  1. Follow-up pending labs from today   2. Follow-up with Dr. Domingo regarding dyspnea on exertion  3. IV iron if needed for worsening iron deficiency  4. If everything is stable, follow-up in six months with a CBC reticulocyte count ferritin iron profile and vitamin B12 level.  5. Carotid duplex today per vascular surgery for carotid stenosis  6. I advised her to let us know if she needs anything prior to that

## 2022-05-17 ENCOUNTER — OFFICE VISIT (OUTPATIENT)
Dept: ONCOLOGY | Facility: CLINIC | Age: 72
End: 2022-05-17

## 2022-05-17 ENCOUNTER — LAB (OUTPATIENT)
Dept: LAB | Facility: HOSPITAL | Age: 72
End: 2022-05-17

## 2022-05-17 VITALS
BODY MASS INDEX: 23.3 KG/M2 | TEMPERATURE: 97.7 F | OXYGEN SATURATION: 98 % | WEIGHT: 118.7 LBS | DIASTOLIC BLOOD PRESSURE: 73 MMHG | HEIGHT: 60 IN | SYSTOLIC BLOOD PRESSURE: 163 MMHG | RESPIRATION RATE: 16 BRPM | HEART RATE: 67 BPM

## 2022-05-17 DIAGNOSIS — E53.8 B12 DEFICIENCY: ICD-10-CM

## 2022-05-17 DIAGNOSIS — D50.9 IRON DEFICIENCY ANEMIA, UNSPECIFIED IRON DEFICIENCY ANEMIA TYPE: ICD-10-CM

## 2022-05-17 DIAGNOSIS — D50.9 IRON DEFICIENCY ANEMIA, UNSPECIFIED IRON DEFICIENCY ANEMIA TYPE: Primary | ICD-10-CM

## 2022-05-17 LAB
ALBUMIN SERPL-MCNC: 4.4 G/DL (ref 3.5–5.2)
ALBUMIN/GLOB SERPL: 1.8 G/DL (ref 1.1–2.4)
ALP SERPL-CCNC: 34 U/L (ref 38–116)
ALT SERPL W P-5'-P-CCNC: 21 U/L (ref 0–33)
ANION GAP SERPL CALCULATED.3IONS-SCNC: 10.2 MMOL/L (ref 5–15)
AST SERPL-CCNC: 22 U/L (ref 0–32)
BASOPHILS # BLD AUTO: 0.07 10*3/MM3 (ref 0–0.2)
BASOPHILS NFR BLD AUTO: 1 % (ref 0–1.5)
BILIRUB SERPL-MCNC: 0.8 MG/DL (ref 0.2–1.2)
BUN SERPL-MCNC: 22 MG/DL (ref 6–20)
BUN/CREAT SERPL: 25 (ref 7.3–30)
CALCIUM SPEC-SCNC: 10 MG/DL (ref 8.5–10.2)
CHLORIDE SERPL-SCNC: 104 MMOL/L (ref 98–107)
CO2 SERPL-SCNC: 25.8 MMOL/L (ref 22–29)
CREAT SERPL-MCNC: 0.88 MG/DL (ref 0.6–1.1)
DEPRECATED RDW RBC AUTO: 45.7 FL (ref 37–54)
EGFRCR SERPLBLD CKD-EPI 2021: 69.5 ML/MIN/1.73
EOSINOPHIL # BLD AUTO: 0.51 10*3/MM3 (ref 0–0.4)
EOSINOPHIL NFR BLD AUTO: 6.9 % (ref 0.3–6.2)
ERYTHROCYTE [DISTWIDTH] IN BLOOD BY AUTOMATED COUNT: 14.1 % (ref 12.3–15.4)
FERRITIN SERPL-MCNC: 334.9 NG/ML (ref 13–150)
GLOBULIN UR ELPH-MCNC: 2.5 GM/DL (ref 1.8–3.5)
GLUCOSE SERPL-MCNC: 126 MG/DL (ref 74–124)
HCT VFR BLD AUTO: 40.9 % (ref 34–46.6)
HGB BLD-MCNC: 13.1 G/DL (ref 12–15.9)
HGB RETIC QN AUTO: 33.8 PG (ref 29.8–36.1)
IMM GRANULOCYTES # BLD AUTO: 0.04 10*3/MM3 (ref 0–0.05)
IMM GRANULOCYTES NFR BLD AUTO: 0.5 % (ref 0–0.5)
IMM RETICS NFR: 9.2 % (ref 3–15.8)
IRON 24H UR-MRATE: 98 MCG/DL (ref 37–145)
IRON SATN MFR SERPL: 22 % (ref 14–48)
LYMPHOCYTES # BLD AUTO: 1.43 10*3/MM3 (ref 0.7–3.1)
LYMPHOCYTES NFR BLD AUTO: 19.5 % (ref 19.6–45.3)
MCH RBC QN AUTO: 28.4 PG (ref 26.6–33)
MCHC RBC AUTO-ENTMCNC: 32 G/DL (ref 31.5–35.7)
MCV RBC AUTO: 88.5 FL (ref 79–97)
MONOCYTES # BLD AUTO: 0.58 10*3/MM3 (ref 0.1–0.9)
MONOCYTES NFR BLD AUTO: 7.9 % (ref 5–12)
NEUTROPHILS NFR BLD AUTO: 4.72 10*3/MM3 (ref 1.7–7)
NEUTROPHILS NFR BLD AUTO: 64.2 % (ref 42.7–76)
NRBC BLD AUTO-RTO: 0 /100 WBC (ref 0–0.2)
PLATELET # BLD AUTO: 281 10*3/MM3 (ref 140–450)
PMV BLD AUTO: 10.9 FL (ref 6–12)
POTASSIUM SERPL-SCNC: 4.1 MMOL/L (ref 3.5–4.7)
PROT SERPL-MCNC: 6.9 G/DL (ref 6.3–8)
RBC # BLD AUTO: 4.62 10*6/MM3 (ref 3.77–5.28)
RETICS # AUTO: 0.09 10*6/MM3 (ref 0.02–0.13)
RETICS/RBC NFR AUTO: 1.85 % (ref 0.7–1.9)
SODIUM SERPL-SCNC: 140 MMOL/L (ref 134–145)
TIBC SERPL-MCNC: 441 MCG/DL (ref 249–505)
TRANSFERRIN SERPL-MCNC: 315 MG/DL (ref 200–360)
VIT B12 BLD-MCNC: 548 PG/ML (ref 211–946)
WBC NRBC COR # BLD: 7.35 10*3/MM3 (ref 3.4–10.8)

## 2022-05-17 PROCEDURE — 85046 RETICYTE/HGB CONCENTRATE: CPT

## 2022-05-17 PROCEDURE — 82728 ASSAY OF FERRITIN: CPT

## 2022-05-17 PROCEDURE — 84466 ASSAY OF TRANSFERRIN: CPT

## 2022-05-17 PROCEDURE — 82607 VITAMIN B-12: CPT | Performed by: INTERNAL MEDICINE

## 2022-05-17 PROCEDURE — 85025 COMPLETE CBC W/AUTO DIFF WBC: CPT

## 2022-05-17 PROCEDURE — 83540 ASSAY OF IRON: CPT

## 2022-05-17 PROCEDURE — 80053 COMPREHEN METABOLIC PANEL: CPT

## 2022-05-17 PROCEDURE — 36415 COLL VENOUS BLD VENIPUNCTURE: CPT

## 2022-05-17 PROCEDURE — 99214 OFFICE O/P EST MOD 30 MIN: CPT | Performed by: INTERNAL MEDICINE

## 2022-05-17 RX ORDER — AMLODIPINE BESYLATE 5 MG/1
5 TABLET ORAL DAILY
COMMUNITY
Start: 2022-04-22

## 2022-06-07 ENCOUNTER — OFFICE VISIT (OUTPATIENT)
Dept: CARDIOLOGY | Facility: CLINIC | Age: 72
End: 2022-06-07

## 2022-06-07 VITALS
SYSTOLIC BLOOD PRESSURE: 122 MMHG | WEIGHT: 118 LBS | HEART RATE: 74 BPM | DIASTOLIC BLOOD PRESSURE: 80 MMHG | HEIGHT: 60 IN | BODY MASS INDEX: 23.16 KG/M2

## 2022-06-07 DIAGNOSIS — E78.5 HYPERLIPIDEMIA, UNSPECIFIED HYPERLIPIDEMIA TYPE: ICD-10-CM

## 2022-06-07 DIAGNOSIS — R06.02 SOB (SHORTNESS OF BREATH): ICD-10-CM

## 2022-06-07 DIAGNOSIS — I25.119 ATHEROSCLEROSIS OF NATIVE CORONARY ARTERY OF NATIVE HEART WITH ANGINA PECTORIS: ICD-10-CM

## 2022-06-07 DIAGNOSIS — I10 ESSENTIAL HYPERTENSION: ICD-10-CM

## 2022-06-07 DIAGNOSIS — R07.2 PRECORDIAL PAIN: ICD-10-CM

## 2022-06-07 DIAGNOSIS — I51.7 LVH (LEFT VENTRICULAR HYPERTROPHY): ICD-10-CM

## 2022-06-07 DIAGNOSIS — I25.10 CORONARY ARTERY CALCIFICATION SEEN ON CT SCAN: Primary | ICD-10-CM

## 2022-06-07 PROCEDURE — 93000 ELECTROCARDIOGRAM COMPLETE: CPT | Performed by: INTERNAL MEDICINE

## 2022-06-07 PROCEDURE — 99204 OFFICE O/P NEW MOD 45 MIN: CPT | Performed by: INTERNAL MEDICINE

## 2022-06-07 NOTE — PROGRESS NOTES
Subjective:     Encounter Date:06/07/22      Patient ID: Shirin Washington is a 72 y.o. female.    Chief Complaint:  History of Present Illness    Dear Dr. Domingo,    I had the pleasure of seeing this patient in the office today for initial evaluation and consultation.  I appreciate that you sent her in to see us.  They come in today to be seen for evaluation of shortness of breath with activity, chest tightness, abnormal coronary calcium scan.    Patient has been seen by Dr. Araujo in the past, most recent visit was 2017.  She had a CT calcium score at that time of 655 and she had a stress test performed that was unremarkable.  Prior to that she was seen in 2014 with symptoms concerning for unstable angina and had a cardiac catheterization that revealed diffuse extraluminal coronary artery calcification and a 50% lesion in the mid LAD.    Recently patient's been having more complaints of exertional dyspnea.  Worsening fatigability.  She has been having mid precordial chest tightness although that sometimes is with shortness of breath and sometimes not.  No radiation of the chest discomfort.  She can bring the dyspnea on easily with walking now.  No lower extremity edema.  No orthopnea or PND.  She has been have some dizziness at times.    She just had a coronary artery calcium scan performed with a total Agatston CAC score of 930.  This represents the 90th percentile.  Scoring was left main equals 5, LAD equals 395, left circumflex equals 122, RCA equals 480.  Calcification of the aortic valve was also noted.    The following portions of the patient's history were reviewed and updated as appropriate: allergies, current medications, past family history, past medical history, past social history, past surgical history and problem list.      ECG 12 Lead    Date/Time: 6/7/2022 2:12 PM  Performed by: Nav Pacheco III, MD  Authorized by: Nav Pacheco III, MD   Comparison: compared with previous ECG   Similar to  "previous ECG  Rhythm: sinus rhythm  Rate: normal  Conduction: conduction normal  ST Segments: ST segments normal  T Waves: T waves normal  QRS axis: normal  Other findings: left ventricular hypertrophy    Clinical impression: normal ECG and non-specific ECG               Objective:     Vitals:    06/07/22 0953   BP: 122/80   Pulse: 74   Weight: 53.5 kg (118 lb)   Height: 152.4 cm (60\")     Body mass index is 23.05 kg/m².      Vitals reviewed.   Constitutional:       General: Not in acute distress.     Appearance: Well-developed. Not diaphoretic.   Eyes:      General:         Right eye: No discharge.         Left eye: No discharge.      Conjunctiva/sclera: Conjunctivae normal.      Pupils: Pupils are equal, round, and reactive to light.   HENT:      Head: Normocephalic and atraumatic.      Nose: Nose normal.   Neck:      Thyroid: No thyromegaly.      Trachea: No tracheal deviation.      Lymphadenopathy: No cervical adenopathy.   Pulmonary:      Effort: Pulmonary effort is normal. No respiratory distress.      Breath sounds: Normal breath sounds. No stridor.   Chest:      Chest wall: Not tender to palpatation.   Cardiovascular:      Normal rate. Regular rhythm.      Murmurs: There is a grade 1/6 mid frequency midsystolic murmur at the URSB, radiating to the neck.      . No S3 gallop. No click. No rub.   Pulses:     Intact distal pulses.   Abdominal:      General: Bowel sounds are normal. There is no distension.      Palpations: Abdomen is soft. There is no abdominal mass.      Tenderness: There is no abdominal tenderness. There is no guarding or rebound.   Musculoskeletal: Normal range of motion.         General: No tenderness or deformity.      Cervical back: Normal range of motion and neck supple. Skin:     General: Skin is warm and dry.      Findings: No erythema or rash.   Neurological:      Mental Status: Alert and oriented to person, place, and time.      Deep Tendon Reflexes: Reflexes are normal and symmetric. "   Psychiatric:         Thought Content: Thought content normal.         Data and records reviewed:     Lab Results   Component Value Date    GLUCOSE 126 (H) 05/17/2022    BUN 22 (H) 05/17/2022    CREATININE 0.88 05/17/2022    EGFRIFNONA 60 (L) 05/11/2021    EGFRIFAFRI 82 02/04/2021    BCR 25.0 05/17/2022    K 4.1 05/17/2022    CO2 25.8 05/17/2022    CALCIUM 10.0 05/17/2022    PROTENTOTREF 6.1 02/04/2021    ALBUMIN 4.40 05/17/2022    LABIL2 1.9 02/04/2021    AST 22 05/17/2022    ALT 21 05/17/2022     No results found for: CHOL  Lab Results   Component Value Date    TRIG 115 02/04/2021    TRIG 100 07/04/2014     Lab Results   Component Value Date    HDL 47 02/04/2021    HDL 47 07/04/2014     Lab Results   Component Value Date    LDL 87 02/04/2021    LDL 88 07/04/2014     Lab Results   Component Value Date    VLDL 21 02/04/2021     No results found for: LDLHDL  CBC    CBC 11/16/21 5/17/22   WBC 7.65 7.35   RBC 4.79 4.62   Hemoglobin 13.5 13.1   Hematocrit 41.2 40.9   MCV 86.0 88.5   MCH 28.2 28.4   MCHC 32.8 32.0   RDW 13.3 14.1   Platelets 275 281           No radiology results for the last 90 days.          Assessment:          Diagnosis Plan   1. Coronary artery calcification seen on CT scan  Stress Test With Myocardial Perfusion One Day    Adult Transthoracic Echo Complete W/ Cont if Necessary Per Protocol    ECG 12 Lead   2. Atherosclerosis of native coronary artery of native heart with angina pectoris (HCC)  Stress Test With Myocardial Perfusion One Day    Adult Transthoracic Echo Complete W/ Cont if Necessary Per Protocol    ECG 12 Lead   3. SOB (shortness of breath)  Stress Test With Myocardial Perfusion One Day    Adult Transthoracic Echo Complete W/ Cont if Necessary Per Protocol    ECG 12 Lead   4. Precordial pain  Stress Test With Myocardial Perfusion One Day    Adult Transthoracic Echo Complete W/ Cont if Necessary Per Protocol    ECG 12 Lead   5. Essential hypertension  Stress Test With Myocardial  Perfusion One Day    Adult Transthoracic Echo Complete W/ Cont if Necessary Per Protocol    ECG 12 Lead   6. Hyperlipidemia, unspecified hyperlipidemia type  Stress Test With Myocardial Perfusion One Day    Adult Transthoracic Echo Complete W/ Cont if Necessary Per Protocol    ECG 12 Lead   7. LVH (left ventricular hypertrophy)  Stress Test With Myocardial Perfusion One Day    Adult Transthoracic Echo Complete W/ Cont if Necessary Per Protocol    ECG 12 Lead          Plan:       1.  Exertional dyspnea, chest discomfort, abnormal coronary artery calcium scan, with known coronary artery disease dating back to 2014-we will arrange for stress Cardiolite to be performed.  2.  Dyspnea, murmur, calcification noted on the aorta on the CT scan, LVH on the EKG, an echocardiogram will be obtained  3.  Mixed hyperlipidemia on lipid-lowering therapy, continue same  4.  Hypertension, blood pressure reasonably well controlled, will await the results on the echocardiogram and we will see her blood pressure response with exercise.    Thank you very much for allowing us to participate in the care of this pleasant patient.  Please don't hesitate to call if I can be of assistance in any way.      Current Outpatient Medications:   •  ALPRAZolam (XANAX) 0.25 MG tablet, 0.25 mg 3 (Three) Times a Day As Needed., Disp: , Rfl:   •  amLODIPine (NORVASC) 5 MG tablet, Take 5 mg by mouth Daily., Disp: , Rfl:   •  aspirin 81 MG tablet, Take 81 mg by mouth Daily., Disp: , Rfl:   •  atenolol (TENORMIN) 50 MG tablet, Take 1 tablet by mouth Daily., Disp: 30 tablet, Rfl: 6  •  atorvastatin (LIPITOR) 80 MG tablet, Take 80 mg by mouth 3 (Three) Times a Week., Disp: , Rfl:   •  ezetimibe (ZETIA) 10 MG tablet, Take 10 mg by mouth Daily., Disp: , Rfl:   •  fenofibrate (TRICOR) 145 MG tablet, Take 145 mg by mouth Daily., Disp: , Rfl:   •  Fluzone High-Dose Quadrivalent 0.7 ML suspension prefilled syringe, PHARMACY ADMINISTERED, Disp: , Rfl:   •  Insulin  Degludec (TRESIBA FLEXTOUCH) 200 UNIT/ML solution pen-injector pen injection, Inject 16 Units under the skin into the appropriate area as directed every night at bedtime. 16 units SQ at bedtime, Disp: 2 pen, Rfl: 4  •  Insulin Pen Needle (BD PEN NEEDLE SOCO U/F) 32G X 4 MM misc, 4 times daily, Disp: 200 each, Rfl: 3  •  meloxicam (MOBIC) 15 MG tablet, Take 15 mg by mouth daily., Disp: , Rfl:   •  metoclopramide (REGLAN) 10 MG tablet, Take 10 mg by mouth 4 (Four) Times a Day Before Meals & at Bedtime. Take 1/2 tablet 30 minutes before dinner. PRN, Disp: , Rfl:   •  NovoLOG FlexPen 100 UNIT/ML solution pen-injector sc pen, 15 UNITS SQ TID WITH MEALS, Disp: 60 mL, Rfl: 1  •  ONETOUCH DELICA LANCETS 33G misc, , Disp: , Rfl:   •  SYNTHROID 50 MCG tablet, Take 1 tablet by mouth Daily., Disp: 30 tablet, Rfl: 6  •  zolpidem (AMBIEN) 10 MG tablet, Take 10 mg by mouth Every Night., Disp: , Rfl:   •  amLODIPine (NORVASC) 2.5 MG tablet, Take 5 mg by mouth Daily., Disp: , Rfl:          No follow-ups on file.

## 2022-06-20 ENCOUNTER — INFUSION (OUTPATIENT)
Dept: ONCOLOGY | Facility: HOSPITAL | Age: 72
End: 2022-06-20

## 2022-06-20 VITALS — HEIGHT: 60 IN | RESPIRATION RATE: 18 BRPM | BODY MASS INDEX: 23.05 KG/M2 | TEMPERATURE: 97.7 F

## 2022-06-20 DIAGNOSIS — M81.0 OSTEOPOROSIS, UNSPECIFIED OSTEOPOROSIS TYPE, UNSPECIFIED PATHOLOGICAL FRACTURE PRESENCE: ICD-10-CM

## 2022-06-20 DIAGNOSIS — M81.0 OSTEOPOROSIS, POST-MENOPAUSAL: Primary | ICD-10-CM

## 2022-06-20 PROCEDURE — 96372 THER/PROPH/DIAG INJ SC/IM: CPT

## 2022-06-20 PROCEDURE — 25010000002 DENOSUMAB 60 MG/ML SOLUTION PREFILLED SYRINGE: Performed by: INTERNAL MEDICINE

## 2022-06-20 RX ADMIN — DENOSUMAB 60 MG: 60 INJECTION SUBCUTANEOUS at 14:15

## 2022-06-20 NOTE — NURSING NOTE
Arrived  for prolia injection. Indication and side effects reviewed. Denies recent dental work. Labs and medications verified. Prolia administered in left arm without incidence. Instructed to call prescribing MD for any concerns or questions and instructed on how to schedule future appts.  Pt vu and discharged in stable condition.

## 2022-06-29 ENCOUNTER — HOSPITAL ENCOUNTER (OUTPATIENT)
Dept: CARDIOLOGY | Facility: HOSPITAL | Age: 72
Discharge: HOME | End: 2022-06-29

## 2022-06-29 VITALS
HEIGHT: 60 IN | DIASTOLIC BLOOD PRESSURE: 72 MMHG | HEART RATE: 66 BPM | WEIGHT: 118 LBS | BODY MASS INDEX: 23.16 KG/M2 | SYSTOLIC BLOOD PRESSURE: 138 MMHG

## 2022-06-29 DIAGNOSIS — E78.5 HYPERLIPIDEMIA, UNSPECIFIED HYPERLIPIDEMIA TYPE: ICD-10-CM

## 2022-06-29 DIAGNOSIS — I25.10 CORONARY ARTERY CALCIFICATION SEEN ON CT SCAN: ICD-10-CM

## 2022-06-29 DIAGNOSIS — I25.119 ATHEROSCLEROSIS OF NATIVE CORONARY ARTERY OF NATIVE HEART WITH ANGINA PECTORIS: ICD-10-CM

## 2022-06-29 DIAGNOSIS — I51.7 LVH (LEFT VENTRICULAR HYPERTROPHY): ICD-10-CM

## 2022-06-29 DIAGNOSIS — R07.2 PRECORDIAL PAIN: ICD-10-CM

## 2022-06-29 DIAGNOSIS — R06.02 SOB (SHORTNESS OF BREATH): ICD-10-CM

## 2022-06-29 DIAGNOSIS — I10 ESSENTIAL HYPERTENSION: ICD-10-CM

## 2022-06-29 LAB
AORTIC ARCH: 2.8 CM
ASCENDING AORTA: 3 CM
BH CV ECHO MEAS - ACS: 1.7 CM
BH CV ECHO MEAS - AI P1/2T: 470.5 MSEC
BH CV ECHO MEAS - AO MAX PG: 6.5 MMHG
BH CV ECHO MEAS - AO MEAN PG: 3.1 MMHG
BH CV ECHO MEAS - AO ROOT DIAM: 3 CM
BH CV ECHO MEAS - AO V2 MAX: 127.9 CM/SEC
BH CV ECHO MEAS - AO V2 VTI: 31.2 CM
BH CV ECHO MEAS - AVA(I,D): 2.32 CM2
BH CV ECHO MEAS - EDV(CUBED): 103.8 ML
BH CV ECHO MEAS - EDV(MOD-SP2): 68 ML
BH CV ECHO MEAS - EDV(MOD-SP4): 56 ML
BH CV ECHO MEAS - EF(MOD-BP): 53.8 %
BH CV ECHO MEAS - EF(MOD-SP2): 52.9 %
BH CV ECHO MEAS - EF(MOD-SP4): 51.8 %
BH CV ECHO MEAS - ESV(CUBED): 27 ML
BH CV ECHO MEAS - ESV(MOD-SP2): 32 ML
BH CV ECHO MEAS - ESV(MOD-SP4): 27 ML
BH CV ECHO MEAS - FS: 36.2 %
BH CV ECHO MEAS - IVS/LVPW: 1.14 CM
BH CV ECHO MEAS - IVSD: 0.9 CM
BH CV ECHO MEAS - LAT PEAK E' VEL: 7.8 CM/SEC
BH CV ECHO MEAS - LV DIASTOLIC VOL/BSA (35-75): 37.5 CM2
BH CV ECHO MEAS - LV MASS(C)D: 131.1 GRAMS
BH CV ECHO MEAS - LV MAX PG: 3 MMHG
BH CV ECHO MEAS - LV MEAN PG: 1.58 MMHG
BH CV ECHO MEAS - LV SYSTOLIC VOL/BSA (12-30): 18.1 CM2
BH CV ECHO MEAS - LV V1 MAX: 86.5 CM/SEC
BH CV ECHO MEAS - LV V1 VTI: 24 CM
BH CV ECHO MEAS - LVIDD: 4.7 CM
BH CV ECHO MEAS - LVIDS: 3 CM
BH CV ECHO MEAS - LVOT AREA: 3 CM2
BH CV ECHO MEAS - LVOT DIAM: 1.96 CM
BH CV ECHO MEAS - LVPWD: 0.79 CM
BH CV ECHO MEAS - MED PEAK E' VEL: 3.8 CM/SEC
BH CV ECHO MEAS - MR MAX PG: 85.2 MMHG
BH CV ECHO MEAS - MR MAX VEL: 461.5 CM/SEC
BH CV ECHO MEAS - MV A DUR: 0.19 SEC
BH CV ECHO MEAS - MV A MAX VEL: 116.1 CM/SEC
BH CV ECHO MEAS - MV DEC SLOPE: 278.9 CM/SEC2
BH CV ECHO MEAS - MV DEC TIME: 0.26 MSEC
BH CV ECHO MEAS - MV E MAX VEL: 82.7 CM/SEC
BH CV ECHO MEAS - MV E/A: 0.71
BH CV ECHO MEAS - MV MAX PG: 5.8 MMHG
BH CV ECHO MEAS - MV MEAN PG: 2.28 MMHG
BH CV ECHO MEAS - MV P1/2T: 100 MSEC
BH CV ECHO MEAS - MV V2 VTI: 34.6 CM
BH CV ECHO MEAS - MVA(P1/2T): 2.2 CM2
BH CV ECHO MEAS - MVA(VTI): 2.09 CM2
BH CV ECHO MEAS - PA ACC TIME: 0.13 SEC
BH CV ECHO MEAS - PA PR(ACCEL): 22 MMHG
BH CV ECHO MEAS - PA V2 MAX: 78.7 CM/SEC
BH CV ECHO MEAS - PULM A REVS DUR: 0.22 SEC
BH CV ECHO MEAS - PULM A REVS VEL: 36 CM/SEC
BH CV ECHO MEAS - PULM DIAS VEL: 28.3 CM/SEC
BH CV ECHO MEAS - PULM S/D: 1.64
BH CV ECHO MEAS - PULM SYS VEL: 46.3 CM/SEC
BH CV ECHO MEAS - QP/QS: 0.42
BH CV ECHO MEAS - RAP SYSTOLE: 3 MMHG
BH CV ECHO MEAS - RV MAX PG: 1.11 MMHG
BH CV ECHO MEAS - RV V1 MAX: 52.7 CM/SEC
BH CV ECHO MEAS - RV V1 VTI: 13.9 CM
BH CV ECHO MEAS - RVOT DIAM: 1.68 CM
BH CV ECHO MEAS - RVSP: 21.4 MMHG
BH CV ECHO MEAS - SI(MOD-SP2): 24.1 ML/M2
BH CV ECHO MEAS - SI(MOD-SP4): 19.4 ML/M2
BH CV ECHO MEAS - SUP REN AO DIAM: 1.8 CM
BH CV ECHO MEAS - SV(LVOT): 72.4 ML
BH CV ECHO MEAS - SV(MOD-SP2): 36 ML
BH CV ECHO MEAS - SV(MOD-SP4): 29 ML
BH CV ECHO MEAS - SV(RVOT): 30.7 ML
BH CV ECHO MEAS - TAPSE (>1.6): 2.5 CM
BH CV ECHO MEAS - TR MAX PG: 18.4 MMHG
BH CV ECHO MEAS - TR MAX VEL: 214.5 CM/SEC
BH CV ECHO MEASUREMENTS AVERAGE E/E' RATIO: 14.26
BH CV NUCLEAR PRIOR STUDY: 2
BH CV REST NUCLEAR ISOTOPE DOSE: 28.6 MCI
BH CV STRESS BP STAGE 1: NORMAL
BH CV STRESS COMMENTS STAGE 1: NORMAL
BH CV STRESS DOSE REGADENOSON STAGE 1: 0.4
BH CV STRESS DURATION MIN STAGE 1: 0
BH CV STRESS DURATION SEC STAGE 1: 10
BH CV STRESS HR STAGE 1: 122
BH CV STRESS NUCLEAR ISOTOPE DOSE: 28.6 MCI
BH CV STRESS PROTOCOL 1: NORMAL
BH CV STRESS RECOVERY BP: NORMAL MMHG
BH CV STRESS RECOVERY HR: 91 BPM
BH CV STRESS STAGE 1: 1
BH CV XLRA - RV BASE: 2.6 CM
BH CV XLRA - RV LENGTH: 5.9 CM
BH CV XLRA - RV MID: 1.34 CM
BH CV XLRA - TDI S': 12.2 CM/SEC
LEFT ATRIUM VOLUME INDEX: 18 ML/M2
LV EF NUC BP: 78 %
MAXIMAL PREDICTED HEART RATE: 146 BPM
MAXIMAL PREDICTED HEART RATE: 146 BPM
PERCENT MAX PREDICTED HR: 83.56 %
SINUS: 2.5 CM
STJ: 1.89 CM
STRESS BASELINE BP: NORMAL MMHG
STRESS BASELINE HR: 70 BPM
STRESS PERCENT HR: 98 %
STRESS POST EXERCISE DUR SEC: 10 SEC
STRESS POST PEAK BP: NORMAL MMHG
STRESS POST PEAK HR: 122 BPM
STRESS TARGET HR: 124 BPM
STRESS TARGET HR: 124 BPM

## 2022-06-29 PROCEDURE — 93306 TTE W/DOPPLER COMPLETE: CPT | Performed by: INTERNAL MEDICINE

## 2022-06-29 PROCEDURE — 93306 TTE W/DOPPLER COMPLETE: CPT

## 2022-06-29 PROCEDURE — 93017 CV STRESS TEST TRACING ONLY: CPT

## 2022-06-29 PROCEDURE — 78492 MYOCRD IMG PET MLT RST&STRS: CPT

## 2022-06-29 PROCEDURE — 0 RUBIDIUM CHLORIDE: Performed by: INTERNAL MEDICINE

## 2022-06-29 PROCEDURE — 78492 MYOCRD IMG PET MLT RST&STRS: CPT | Performed by: INTERNAL MEDICINE

## 2022-06-29 PROCEDURE — 25010000002 REGADENOSON 0.4 MG/5ML SOLUTION: Performed by: INTERNAL MEDICINE

## 2022-06-29 PROCEDURE — 93018 CV STRESS TEST I&R ONLY: CPT | Performed by: INTERNAL MEDICINE

## 2022-06-29 PROCEDURE — 93016 CV STRESS TEST SUPVJ ONLY: CPT | Performed by: INTERNAL MEDICINE

## 2022-06-29 PROCEDURE — A9555 RB82 RUBIDIUM: HCPCS | Performed by: INTERNAL MEDICINE

## 2022-06-29 PROCEDURE — 25010000002 AMINOPHYLLINE PER 250 MG: Performed by: INTERNAL MEDICINE

## 2022-06-29 RX ORDER — AMINOPHYLLINE DIHYDRATE 25 MG/ML
125 INJECTION, SOLUTION INTRAVENOUS ONCE
Status: COMPLETED | OUTPATIENT
Start: 2022-06-29 | End: 2022-06-29

## 2022-06-29 RX ADMIN — REGADENOSON 0.4 MG: 0.08 INJECTION, SOLUTION INTRAVENOUS at 08:37

## 2022-06-29 RX ADMIN — AMINOPHYLLINE 125 MG: 25 INJECTION, SOLUTION INTRAVENOUS at 09:05

## 2022-06-30 RX ORDER — ISOSORBIDE MONONITRATE 30 MG/1
30 TABLET, EXTENDED RELEASE ORAL DAILY
Qty: 30 TABLET | Refills: 11 | Status: SHIPPED | OUTPATIENT
Start: 2022-06-30 | End: 2022-07-14 | Stop reason: SDUPTHER

## 2022-07-05 ENCOUNTER — TELEPHONE (OUTPATIENT)
Dept: CARDIOLOGY | Facility: CLINIC | Age: 72
End: 2022-07-05

## 2022-07-05 NOTE — TELEPHONE ENCOUNTER
----- Message from Nav Pacheco III, MD sent at 6/30/2022  4:14 PM EDT -----  Please call patient and arrange a follow-up appointment in 4 weeks

## 2022-07-14 ENCOUNTER — TELEPHONE (OUTPATIENT)
Dept: CARDIOLOGY | Facility: CLINIC | Age: 72
End: 2022-07-14

## 2022-07-14 RX ORDER — ISOSORBIDE MONONITRATE 30 MG/1
15 TABLET, EXTENDED RELEASE ORAL DAILY
Qty: 15 TABLET | Refills: 0
Start: 2022-07-14

## 2022-07-14 NOTE — TELEPHONE ENCOUNTER
----- Message from Darrel Cline sent at 7/5/2022  2:07 PM EDT -----  Regarding: reschedule appt  Flor,  7/26/22 pt's appt is to be rescheduled due to Dr. Pacheco being out of the office. This appt was an add on by you. Please find another opening for pt.    Dr. Pacheco,  I spoke to pt today she c/o her Isosorbide is making her very fatigued and giving her headaches.      Her phone number is .      Thanks,  Teagan

## 2022-07-14 NOTE — TELEPHONE ENCOUNTER
Please call patient.  Ask her to decrease isosorbide to half tablet and see if that helps her headaches and fatigue.  If not, ask her to call Dr. Pacheco back.    I am confused about the previous message below.  If she needs to reschedule the appointment with Dr. Pacheco, can you do so?  Thanks so much.

## 2022-07-14 NOTE — TELEPHONE ENCOUNTER
Spoke with patient about recommendations from Luisa to decrease isosorbide in half to see if that helps with her symptoms of headaches and fatigue.  Patient verbalizes understanding.  Rescheduled her appointment with NP because Dr. Pacheco will be out of town that day.  Appointment scheduled for the same day 7/26 but with Luisa instead of Dr. Pacheco.      Notified patient of results, patient verbalizes understanding.    Ynei Wen RN  [facility] Triage  07/14/22 13:48 EDT

## 2022-07-15 ENCOUNTER — APPOINTMENT (OUTPATIENT)
Dept: GENERAL RADIOLOGY | Facility: HOSPITAL | Age: 72
End: 2022-07-15

## 2022-07-15 ENCOUNTER — HOSPITAL ENCOUNTER (EMERGENCY)
Facility: HOSPITAL | Age: 72
Discharge: HOME | End: 2022-07-15
Attending: EMERGENCY MEDICINE | Admitting: EMERGENCY MEDICINE

## 2022-07-15 VITALS
RESPIRATION RATE: 18 BRPM | DIASTOLIC BLOOD PRESSURE: 70 MMHG | SYSTOLIC BLOOD PRESSURE: 139 MMHG | TEMPERATURE: 99.2 F | OXYGEN SATURATION: 97 % | HEART RATE: 105 BPM

## 2022-07-15 DIAGNOSIS — U07.1 COVID: Primary | ICD-10-CM

## 2022-07-15 DIAGNOSIS — J02.9 SORE THROAT: ICD-10-CM

## 2022-07-15 DIAGNOSIS — R50.9 FEVER, UNSPECIFIED FEVER CAUSE: ICD-10-CM

## 2022-07-15 LAB
ALBUMIN SERPL-MCNC: 4 G/DL (ref 3.5–5.2)
ALBUMIN/GLOB SERPL: 1.5 G/DL
ALP SERPL-CCNC: 36 U/L (ref 39–117)
ALT SERPL W P-5'-P-CCNC: 33 U/L (ref 1–33)
ANION GAP SERPL CALCULATED.3IONS-SCNC: 9.5 MMOL/L (ref 5–15)
AST SERPL-CCNC: 33 U/L (ref 1–32)
B PARAPERT DNA SPEC QL NAA+PROBE: NOT DETECTED
B PERT DNA SPEC QL NAA+PROBE: NOT DETECTED
BASOPHILS # BLD AUTO: 0.03 10*3/MM3 (ref 0–0.2)
BASOPHILS NFR BLD AUTO: 0.3 % (ref 0–1.5)
BILIRUB SERPL-MCNC: 0.8 MG/DL (ref 0–1.2)
BUN SERPL-MCNC: 23 MG/DL (ref 8–23)
BUN/CREAT SERPL: 25.8 (ref 7–25)
C PNEUM DNA NPH QL NAA+NON-PROBE: NOT DETECTED
CALCIUM SPEC-SCNC: 8.9 MG/DL (ref 8.6–10.5)
CHLORIDE SERPL-SCNC: 106 MMOL/L (ref 98–107)
CO2 SERPL-SCNC: 22.5 MMOL/L (ref 22–29)
CREAT SERPL-MCNC: 0.89 MG/DL (ref 0.57–1)
D-LACTATE SERPL-SCNC: 1.1 MMOL/L (ref 0.5–2)
DEPRECATED RDW RBC AUTO: 40 FL (ref 37–54)
EGFRCR SERPLBLD CKD-EPI 2021: 68.1 ML/MIN/1.73
EOSINOPHIL # BLD AUTO: 0 10*3/MM3 (ref 0–0.4)
EOSINOPHIL NFR BLD AUTO: 0 % (ref 0.3–6.2)
ERYTHROCYTE [DISTWIDTH] IN BLOOD BY AUTOMATED COUNT: 12.9 % (ref 12.3–15.4)
FLUAV SUBTYP SPEC NAA+PROBE: NOT DETECTED
FLUBV RNA ISLT QL NAA+PROBE: NOT DETECTED
GLOBULIN UR ELPH-MCNC: 2.6 GM/DL
GLUCOSE SERPL-MCNC: 137 MG/DL (ref 65–99)
HADV DNA SPEC NAA+PROBE: NOT DETECTED
HCOV 229E RNA SPEC QL NAA+PROBE: NOT DETECTED
HCOV HKU1 RNA SPEC QL NAA+PROBE: NOT DETECTED
HCOV NL63 RNA SPEC QL NAA+PROBE: NOT DETECTED
HCOV OC43 RNA SPEC QL NAA+PROBE: NOT DETECTED
HCT VFR BLD AUTO: 40.3 % (ref 34–46.6)
HGB BLD-MCNC: 13.4 G/DL (ref 12–15.9)
HMPV RNA NPH QL NAA+NON-PROBE: NOT DETECTED
HPIV1 RNA ISLT QL NAA+PROBE: NOT DETECTED
HPIV2 RNA SPEC QL NAA+PROBE: NOT DETECTED
HPIV3 RNA NPH QL NAA+PROBE: NOT DETECTED
HPIV4 P GENE NPH QL NAA+PROBE: NOT DETECTED
IMM GRANULOCYTES # BLD AUTO: 0.03 10*3/MM3 (ref 0–0.05)
IMM GRANULOCYTES NFR BLD AUTO: 0.3 % (ref 0–0.5)
LYMPHOCYTES # BLD AUTO: 0.82 10*3/MM3 (ref 0.7–3.1)
LYMPHOCYTES NFR BLD AUTO: 9.1 % (ref 19.6–45.3)
M PNEUMO IGG SER IA-ACNC: NOT DETECTED
MCH RBC QN AUTO: 28.8 PG (ref 26.6–33)
MCHC RBC AUTO-ENTMCNC: 33.3 G/DL (ref 31.5–35.7)
MCV RBC AUTO: 86.5 FL (ref 79–97)
MONOCYTES # BLD AUTO: 0.89 10*3/MM3 (ref 0.1–0.9)
MONOCYTES NFR BLD AUTO: 9.9 % (ref 5–12)
NEUTROPHILS NFR BLD AUTO: 7.23 10*3/MM3 (ref 1.7–7)
NEUTROPHILS NFR BLD AUTO: 80.4 % (ref 42.7–76)
NRBC BLD AUTO-RTO: 0 /100 WBC (ref 0–0.2)
PLATELET # BLD AUTO: 173 10*3/MM3 (ref 140–450)
PMV BLD AUTO: 11.6 FL (ref 6–12)
POTASSIUM SERPL-SCNC: 3.8 MMOL/L (ref 3.5–5.2)
PROCALCITONIN SERPL-MCNC: 0.06 NG/ML (ref 0–0.25)
PROT SERPL-MCNC: 6.6 G/DL (ref 6–8.5)
QT INTERVAL: 332 MS
RBC # BLD AUTO: 4.66 10*6/MM3 (ref 3.77–5.28)
RHINOVIRUS RNA SPEC NAA+PROBE: NOT DETECTED
RSV RNA NPH QL NAA+NON-PROBE: NOT DETECTED
SARS-COV-2 RNA NPH QL NAA+NON-PROBE: DETECTED
SODIUM SERPL-SCNC: 138 MMOL/L (ref 136–145)
TROPONIN T SERPL-MCNC: <0.01 NG/ML (ref 0–0.03)
WBC NRBC COR # BLD: 9 10*3/MM3 (ref 3.4–10.8)

## 2022-07-15 PROCEDURE — 25010000002 ONDANSETRON PER 1 MG: Performed by: EMERGENCY MEDICINE

## 2022-07-15 PROCEDURE — M0222 HC INJECTION BEBTELOVIMAB: HCPCS | Performed by: EMERGENCY MEDICINE

## 2022-07-15 PROCEDURE — 93010 ELECTROCARDIOGRAM REPORT: CPT | Performed by: INTERNAL MEDICINE

## 2022-07-15 PROCEDURE — 25010000002 INJECTION, BEBTELOVIMAB, 175 MG: Performed by: EMERGENCY MEDICINE

## 2022-07-15 PROCEDURE — 0202U NFCT DS 22 TRGT SARS-COV-2: CPT | Performed by: EMERGENCY MEDICINE

## 2022-07-15 PROCEDURE — 84145 PROCALCITONIN (PCT): CPT | Performed by: EMERGENCY MEDICINE

## 2022-07-15 PROCEDURE — 96374 THER/PROPH/DIAG INJ IV PUSH: CPT

## 2022-07-15 PROCEDURE — 93005 ELECTROCARDIOGRAM TRACING: CPT | Performed by: EMERGENCY MEDICINE

## 2022-07-15 PROCEDURE — 85025 COMPLETE CBC W/AUTO DIFF WBC: CPT | Performed by: EMERGENCY MEDICINE

## 2022-07-15 PROCEDURE — 84484 ASSAY OF TROPONIN QUANT: CPT | Performed by: EMERGENCY MEDICINE

## 2022-07-15 PROCEDURE — 83605 ASSAY OF LACTIC ACID: CPT | Performed by: EMERGENCY MEDICINE

## 2022-07-15 PROCEDURE — 80053 COMPREHEN METABOLIC PANEL: CPT | Performed by: EMERGENCY MEDICINE

## 2022-07-15 PROCEDURE — 87040 BLOOD CULTURE FOR BACTERIA: CPT | Performed by: EMERGENCY MEDICINE

## 2022-07-15 PROCEDURE — 71045 X-RAY EXAM CHEST 1 VIEW: CPT

## 2022-07-15 PROCEDURE — 93005 ELECTROCARDIOGRAM TRACING: CPT

## 2022-07-15 PROCEDURE — 99283 EMERGENCY DEPT VISIT LOW MDM: CPT

## 2022-07-15 RX ORDER — BEBTELOVIMAB 87.5 MG/ML
175 INJECTION, SOLUTION INTRAVENOUS ONCE
Status: COMPLETED | OUTPATIENT
Start: 2022-07-15 | End: 2022-07-15

## 2022-07-15 RX ORDER — DIPHENHYDRAMINE HYDROCHLORIDE 50 MG/ML
50 INJECTION INTRAMUSCULAR; INTRAVENOUS ONCE AS NEEDED
Status: DISCONTINUED | OUTPATIENT
Start: 2022-07-15 | End: 2022-07-15 | Stop reason: HOSPADM

## 2022-07-15 RX ORDER — METHYLPREDNISOLONE SODIUM SUCCINATE 125 MG/2ML
125 INJECTION, POWDER, LYOPHILIZED, FOR SOLUTION INTRAMUSCULAR; INTRAVENOUS ONCE AS NEEDED
Status: DISCONTINUED | OUTPATIENT
Start: 2022-07-15 | End: 2022-07-15 | Stop reason: HOSPADM

## 2022-07-15 RX ORDER — SODIUM CHLORIDE 9 MG/ML
30 INJECTION, SOLUTION INTRAVENOUS ONCE
Status: DISCONTINUED | OUTPATIENT
Start: 2022-07-15 | End: 2022-07-15 | Stop reason: HOSPADM

## 2022-07-15 RX ORDER — ONDANSETRON 2 MG/ML
4 INJECTION INTRAMUSCULAR; INTRAVENOUS ONCE
Status: COMPLETED | OUTPATIENT
Start: 2022-07-15 | End: 2022-07-15

## 2022-07-15 RX ORDER — DIPHENHYDRAMINE HCL 25 MG
50 CAPSULE ORAL ONCE AS NEEDED
Status: DISCONTINUED | OUTPATIENT
Start: 2022-07-15 | End: 2022-07-15 | Stop reason: HOSPADM

## 2022-07-15 RX ORDER — ONDANSETRON 4 MG/1
4 TABLET, ORALLY DISINTEGRATING ORAL 4 TIMES DAILY PRN
Qty: 12 TABLET | Refills: 0 | Status: SHIPPED | OUTPATIENT
Start: 2022-07-15 | End: 2022-07-26

## 2022-07-15 RX ORDER — SODIUM CHLORIDE 0.9 % (FLUSH) 0.9 %
10 SYRINGE (ML) INJECTION AS NEEDED
Status: DISCONTINUED | OUTPATIENT
Start: 2022-07-15 | End: 2022-07-15 | Stop reason: HOSPADM

## 2022-07-15 RX ORDER — ACETAMINOPHEN 500 MG
1000 TABLET ORAL ONCE
Status: COMPLETED | OUTPATIENT
Start: 2022-07-15 | End: 2022-07-15

## 2022-07-15 RX ADMIN — ONDANSETRON 4 MG: 2 INJECTION INTRAMUSCULAR; INTRAVENOUS at 08:54

## 2022-07-15 RX ADMIN — Medication 10 ML: at 10:54

## 2022-07-15 RX ADMIN — BEBTELOVIMAB 175 MG: 87.5 INJECTION, SOLUTION INTRAVENOUS at 10:51

## 2022-07-15 RX ADMIN — ACETAMINOPHEN 1000 MG: 500 TABLET ORAL at 08:53

## 2022-07-15 RX ADMIN — SODIUM CHLORIDE 1000 ML: 9 INJECTION, SOLUTION INTRAVENOUS at 08:54

## 2022-07-15 NOTE — DISCHARGE INSTRUCTIONS
Return here immediately for any worsening shortness of breath, uncontrolled fevers, uncontrolled vomiting.  Take Tylenol to help control your fever.

## 2022-07-15 NOTE — ED PROVIDER NOTES
EMERGENCY DEPARTMENT ENCOUNTER    Room Number:  10/10  Date of encounter:  7/15/2022  PCP: Duncan Domingo MD  Patient Care Team:  Duncan Domingo MD as PCP - General (Internal Medicine)  Axel Lobato MD as Consulting Physician (Hematology and Oncology)  Chika Ospina MD as Referring Physician (General Surgery)  Frank Allen MD as Consulting Physician (Endocrinology)   Historian: Patient    HPI:  Chief Complaint: Cough, shortness of breath  A complete HPI/ROS/PMH/PSH/SH/FH are unobtainable due to: Nothing    Context: Shirin Washington is a 72 y.o. female who presents to the ED c/o developing cough, shortness of breath on Wednesday.  She reports she has had generalized fatigue.  She states that she went to the urgent care center yesterday and was tested for COVID but they have not yet given her results.  She reports that they gave her cough medicine but she is having a difficult time swallowing the pills.  She has not taken any Tylenol or ibuprofen.  She has a history of diabetes.  She has had 2 COVID vaccinations.  Nothing makes her symptoms worse or better.  She states her symptoms been constant since Wednesday.    Prior record review: Urgent care center note dated yesterday with COVID swab results pending.    PAST MEDICAL HISTORY  Active Ambulatory Problems     Diagnosis Date Noted   • Controlled type 2 diabetes mellitus with complication, with long-term current use of insulin ([facility]) 04/25/2016   • Type II diabetes mellitus with neurological manifestations, uncontrolled 04/25/2016   • Encounter for long-term (current) use of insulin ([facility]) 04/25/2016   • Hyperinsulinism 04/25/2016   • Essential hypertension 04/25/2016   • HLD (hyperlipidemia) 04/25/2016   • Iron deficiency anemia 04/28/2016   • Coronary atherosclerosis    • Osteoporosis 05/18/2017   • Adverse effect of iron 09/22/2017   • B12 deficiency 09/27/2017   • Postmenopausal osteoporosis 11/09/2017   • Osteoporosis, post-menopausal  2018   • Hypothyroidism 2018   • Hyperlipidemia associated with type 2 diabetes mellitus (HCC) 2019   • Dizziness 2019   • Primary insomnia 2019   • LVH (left ventricular hypertrophy) 2022     Resolved Ambulatory Problems     Diagnosis Date Noted   • Diabetes mellitus type 2, insulin dependent ([facility])      Past Medical History:   Diagnosis Date   • Anemia    • Arrhythmia 20 yrs ago   • Arthritis    • Asthma As a child   • Back pain    • Background diabetic retinopathy of right eye determined by examination ([facility])    • Gastroparesis due to secondary diabetes ([facility])    • GERD (gastroesophageal reflux disease)    • History of spinal stenosis    • History of spondylolisthesis    • History of URI (upper respiratory infection)    • Hypercholesteremia    • Hyperlipidemia    • Hypertension    • Left-sided weakness    • Leukocytosis    • Osteopenia    • Peripheral neuropathy    • Retinopathy    • Vertigo    • Vitamin D deficiency        The patient has started, but not completed, their COVID-19 vaccination series.    PAST SURGICAL HISTORY  Past Surgical History:   Procedure Laterality Date   • BACK SURGERY  2015    L4-L5 laminectomy, L4-L5, L5-S1 fusion by Dr. Urbina   • CARDIAC CATHETERIZATION      ARTERIAL CATHETERIZATION   • CATARACT EXTRACTION     • CHOLECYSTECTOMY     • HYSTERECTOMY     • KNEE SURGERY Right     Arthroscopy   • VAGINAL HYSTERECTOMY      fibroids, abnormal bleeding         FAMILY HISTORY  Family History   Problem Relation Age of Onset   • Polycythemia Mother         Progressed to secondary myelofibrosis   • Cancer Mother          of mylofibrosis. Has polycythemia & then mylo   • Hypertension Mother    • Hyperlipidemia Mother    • Cirrhosis Father    • Diabetes Other    • Hypertension Other    • Cancer Sister         Squamish cell on back   • Diabetes Maternal Grandfather    • Cancer Neg Hx    • Breast cancer Neg Hx    • Ovarian cancer Neg Hx    • Uterine cancer  Neg Hx    • Colon cancer Neg Hx    • Deep vein thrombosis Neg Hx    • Pulmonary embolism Neg Hx          SOCIAL HISTORY  Social History     Socioeconomic History   • Marital status:      Spouse name: Yehuda   Tobacco Use   • Smoking status: Never Smoker   • Smokeless tobacco: Never Used   Vaping Use   • Vaping Use: Never used   Substance and Sexual Activity   • Alcohol use: Yes     Comment: rarely   • Drug use: No   • Sexual activity: Not Currently     Partners: Male     Birth control/protection: None         ALLERGIES  Topamax [topiramate]        REVIEW OF SYSTEMS  Review of Systems   Positive fever, positive chills, positive cough, positive shortness of breath, positive nausea, no vomiting, no abdominal pain  All systems reviewed and negative except for those discussed in HPI.       PHYSICAL EXAM    I have reviewed the triage vital signs and nursing notes.    ED Triage Vitals [07/15/22 0741]   Temp Heart Rate Resp BP SpO2   (!) 101.4 °F (38.6 °C) 105 18 -- 97 %      Temp src Heart Rate Source Patient Position BP Location FiO2 (%)   Tympanic Monitor -- -- --       Physical Exam  GENERAL: Awake, alert, no acute distress, febrile  SKIN: Warm, dry  HENT: Normocephalic, atraumatic, no pharyngeal asymmetry.  No stridor or drooling.   EYES: no scleral icterus  CV: regular rhythm, regular rate  RESPIRATORY: normal effort, lungs clear  ABDOMEN: soft, nontender, nondistended  MUSCULOSKELETAL: no deformity  NEURO: alert, moves all extremities, follows commands          LAB RESULTS  Recent Results (from the past 24 hour(s))   ECG 12 Lead    Collection Time: 07/15/22  7:51 AM   Result Value Ref Range    QT Interval 332 ms   Comprehensive Metabolic Panel    Collection Time: 07/15/22  8:51 AM    Specimen: Blood   Result Value Ref Range    Glucose 137 (H) 65 - 99 mg/dL    BUN 23 8 - 23 mg/dL    Creatinine 0.89 0.57 - 1.00 mg/dL    Sodium 138 136 - 145 mmol/L    Potassium 3.8 3.5 - 5.2 mmol/L    Chloride 106 98 - 107 mmol/L     CO2 22.5 22.0 - 29.0 mmol/L    Calcium 8.9 8.6 - 10.5 mg/dL    Total Protein 6.6 6.0 - 8.5 g/dL    Albumin 4.00 3.50 - 5.20 g/dL    ALT (SGPT) 33 1 - 33 U/L    AST (SGOT) 33 (H) 1 - 32 U/L    Alkaline Phosphatase 36 (L) 39 - 117 U/L    Total Bilirubin 0.8 0.0 - 1.2 mg/dL    Globulin 2.6 gm/dL    A/G Ratio 1.5 g/dL    BUN/Creatinine Ratio 25.8 (H) 7.0 - 25.0    Anion Gap 9.5 5.0 - 15.0 mmol/L    eGFR 68.1 >60.0 mL/min/1.73   Lactic Acid, Plasma    Collection Time: 07/15/22  8:51 AM    Specimen: Blood   Result Value Ref Range    Lactate 1.1 0.5 - 2.0 mmol/L   Procalcitonin    Collection Time: 07/15/22  8:51 AM    Specimen: Blood   Result Value Ref Range    Procalcitonin 0.06 0.00 - 0.25 ng/mL   Troponin    Collection Time: 07/15/22  8:51 AM    Specimen: Blood   Result Value Ref Range    Troponin T <0.010 0.000 - 0.030 ng/mL   CBC Auto Differential    Collection Time: 07/15/22  8:51 AM    Specimen: Blood   Result Value Ref Range    WBC 9.00 3.40 - 10.80 10*3/mm3    RBC 4.66 3.77 - 5.28 10*6/mm3    Hemoglobin 13.4 12.0 - 15.9 g/dL    Hematocrit 40.3 34.0 - 46.6 %    MCV 86.5 79.0 - 97.0 fL    MCH 28.8 26.6 - 33.0 pg    MCHC 33.3 31.5 - 35.7 g/dL    RDW 12.9 12.3 - 15.4 %    RDW-SD 40.0 37.0 - 54.0 fl    MPV 11.6 6.0 - 12.0 fL    Platelets 173 140 - 450 10*3/mm3    Neutrophil % 80.4 (H) 42.7 - 76.0 %    Lymphocyte % 9.1 (L) 19.6 - 45.3 %    Monocyte % 9.9 5.0 - 12.0 %    Eosinophil % 0.0 (L) 0.3 - 6.2 %    Basophil % 0.3 0.0 - 1.5 %    Immature Grans % 0.3 0.0 - 0.5 %    Neutrophils, Absolute 7.23 (H) 1.70 - 7.00 10*3/mm3    Lymphocytes, Absolute 0.82 0.70 - 3.10 10*3/mm3    Monocytes, Absolute 0.89 0.10 - 0.90 10*3/mm3    Eosinophils, Absolute 0.00 0.00 - 0.40 10*3/mm3    Basophils, Absolute 0.03 0.00 - 0.20 10*3/mm3    Immature Grans, Absolute 0.03 0.00 - 0.05 10*3/mm3    nRBC 0.0 0.0 - 0.2 /100 WBC   Respiratory Panel PCR w/COVID-19(SARS-CoV-2) CECILIA/MARS/OSCAR/PAD/COR/MAD/SUZETTE In-House, NP Swab in [facility]TM/[facility], [place], 3-4 HR TAT -  Swab, Nasopharynx    Collection Time: 07/15/22  8:52 AM    Specimen: Nasopharynx; Swab   Result Value Ref Range    ADENOVIRUS, PCR Not Detected Not Detected    Coronavirus 229E Not Detected Not Detected    Coronavirus HKU1 Not Detected Not Detected    Coronavirus NL63 Not Detected Not Detected    Coronavirus OC43 Not Detected Not Detected    COVID19 Detected (C) Not Detected - Ref. Range    Human Metapneumovirus Not Detected Not Detected    Human Rhinovirus/Enterovirus Not Detected Not Detected    Influenza A PCR Not Detected Not Detected    Influenza B PCR Not Detected Not Detected    Parainfluenza Virus 1 Not Detected Not Detected    Parainfluenza Virus 2 Not Detected Not Detected    Parainfluenza Virus 3 Not Detected Not Detected    Parainfluenza Virus 4 Not Detected Not Detected    RSV, PCR Not Detected Not Detected    Bordetella pertussis pcr Not Detected Not Detected    Bordetella parapertussis PCR Not Detected Not Detected    Chlamydophila pneumoniae PCR Not Detected Not Detected    Mycoplasma pneumo by PCR Not Detected Not Detected       Ordered the above labs and independently reviewed the results.        RADIOLOGY  XR Chest 1 View    Result Date: 7/15/2022  ONE VIEW PORTABLE CHEST  HISTORY: Cough and shortness of breath.  FINDINGS: The lungs are moderately well-expanded and clear except for a calcified granuloma at the left base. The heart size is normal. There is no acute disease or change from 07/03/2014.  This report was finalized on 7/15/2022 8:35 AM by Dr. Nam Jackson M.D.        I ordered the above noted radiological studies. Reviewed by me and discussed with radiologist.  See dictation for official radiology interpretation.      PROCEDURES    Procedures      MEDICATIONS GIVEN IN ER    Medications   sodium chloride 0.9 % flush 10 mL (10 mL Intravenous Given 7/15/22 1054)   EPINEPHrine (ANAPHYLAXIS) 1 mg/ml injection kit (has no administration in time range)   diphenhydrAMINE (BENADRYL) capsule 50  mg (has no administration in time range)     Or   diphenhydrAMINE (BENADRYL) injection 50 mg (has no administration in time range)   methylPREDNISolone sodium succinate (SOLU-Medrol) injection 125 mg (has no administration in time range)   sodium chloride 0.9 % infusion 30 mL (has no administration in time range)   sodium chloride 0.9 % bolus 1,000 mL (0 mL Intravenous Stopped 7/15/22 1054)   acetaminophen (TYLENOL) tablet 1,000 mg (1,000 mg Oral Given 7/15/22 0853)   ondansetron (ZOFRAN) injection 4 mg (4 mg Intravenous Given 7/15/22 0854)   bebtelovimab injection 175 mg (175 mg Intravenous Given 7/15/22 1051)         PROGRESS, DATA ANALYSIS, CONSULTS, AND MEDICAL DECISION MAKING    All labs have been independently reviewed by me.  All radiology studies have been reviewed by me and discussed with radiologist dictating the report.   EKG's independently viewed and interpreted by me.  Discussion below represents my analysis of pertinent findings related to patient's condition, differential diagnosis, treatment plan and final disposition.    Differential diagnosis includes but is not limited to COVID, pneumonia, viral syndrome, fever, sepsis, PE.    ED Course as of 07/15/22 1159   Fri Jul 15, 2022   0846 EKG          EKG time: 751  Rhythm/Rate: Normal sinus, rate 95  P waves and MA: Normal P, normal MA  QRS, axis: Narrow QRS, normal axis  ST and T waves: Normal ST and T waves    Interpreted Contemporaneously by me, independently viewed  Not significantly changed compared to prior 1/30/2015   [TR]   1032 Lactate: 1.1 [TR]   1032 Troponin T: <0.010 [TR]   1032 WBC: 9.00 [TR]   1032 COVID19(!!): Detected [TR]   1032 Creatinine: 0.89 [TR]   1036 I reviewed work-up findings with the patient at the bedside.  She is COVID-positive.  She has medication contraindications to Paxlovid.  She is agreeable to monoclonal antibody infusion.  I advised her that this is approved under emergency authorization and provide her education.   She is agreeable to proceed. [TR]   0523 The patient has had no allergic reactions or side effects. [TR]      ED Course User Index  [TR] Anibal Hernandez MD           PPE: The patient wore a mask and I wore an N95 mask throughout the entire patient encounter.  I wore a gown.  I wore goggles.      AS OF 11:59 EDT VITALS:    BP - 139/70  HR - 105  TEMP - 99.2 °F (37.3 °C)  O2 SATS - 97%        DIAGNOSIS  Final diagnoses:   COVID   Sore throat   Fever, unspecified fever cause         DISPOSITION  ED Disposition     ED Disposition   Discharge    Condition   Stable    Comment   --                   Anibal Hernandez MD  07/15/22 9033

## 2022-07-20 LAB
BACTERIA SPEC AEROBE CULT: NORMAL
BACTERIA SPEC AEROBE CULT: NORMAL

## 2022-07-26 ENCOUNTER — OFFICE VISIT (OUTPATIENT)
Dept: CARDIOLOGY | Facility: CLINIC | Age: 72
End: 2022-07-26

## 2022-07-26 VITALS
SYSTOLIC BLOOD PRESSURE: 142 MMHG | HEART RATE: 68 BPM | WEIGHT: 115 LBS | HEIGHT: 60 IN | BODY MASS INDEX: 22.58 KG/M2 | DIASTOLIC BLOOD PRESSURE: 88 MMHG

## 2022-07-26 DIAGNOSIS — I10 ESSENTIAL HYPERTENSION: ICD-10-CM

## 2022-07-26 DIAGNOSIS — I65.23 BILATERAL CAROTID ARTERY STENOSIS: ICD-10-CM

## 2022-07-26 DIAGNOSIS — R07.89 CHEST PRESSURE: ICD-10-CM

## 2022-07-26 DIAGNOSIS — E78.2 MIXED HYPERLIPIDEMIA: ICD-10-CM

## 2022-07-26 DIAGNOSIS — R53.83 OTHER FATIGUE: ICD-10-CM

## 2022-07-26 DIAGNOSIS — I25.118 ATHEROSCLEROSIS OF NATIVE CORONARY ARTERY OF NATIVE HEART WITH STABLE ANGINA PECTORIS: Primary | ICD-10-CM

## 2022-07-26 DIAGNOSIS — U07.1 COVID-19: ICD-10-CM

## 2022-07-26 DIAGNOSIS — R06.09 DYSPNEA ON EXERTION: ICD-10-CM

## 2022-07-26 PROBLEM — R42 DIZZINESS: Status: RESOLVED | Noted: 2019-04-01 | Resolved: 2022-07-26

## 2022-07-26 PROBLEM — M81.0 POSTMENOPAUSAL OSTEOPOROSIS: Status: RESOLVED | Noted: 2017-11-09 | Resolved: 2022-07-26

## 2022-07-26 PROCEDURE — 99214 OFFICE O/P EST MOD 30 MIN: CPT | Performed by: NURSE PRACTITIONER

## 2022-07-26 PROCEDURE — 93000 ELECTROCARDIOGRAM COMPLETE: CPT | Performed by: NURSE PRACTITIONER

## 2022-07-26 NOTE — PROGRESS NOTES
Date of Office Visit: 2022  Encounter Provider: SHERI Ortiz  Place of Service: [facility] CARDIOLOGY  Patient Name: Shirin Washington  :1950  Primary Cardiologist: Dr. Nav Pacheco     Chief Complaint   Patient presents with   • Chest Pain   • Shortness of Breath   :     HPI: Shirin Washington is a pleasant 72 y.o. female who presents today for follow-up on chest pressure, shortness of breath, and fatigue.  She is new patient to me and I have reviewed her medical records.    In , she had symptoms concerning for unstable angina.  Cardiac catheterization revealed diffuse extraluminal coronary artery calcification and 50% lesion in the mid LAD.    In , she saw Dr. Araujo.  CT calcium score at that time was 655.  Stress test was unremarkable.    In May 2021, she had a carotid duplex completed which showed mild right stenosis and mild to moderate on the left.    In May 2022, coronary artery calcium score showed total Agatston score of 930 (left main 5, , left circumflex 122, , and aortic valve calcification).     In 2022, she saw Dr. Pacheco with complaints of fatigue, exertional dyspnea, and mid precordial chest tightness/pressure.    On 2022, 2D echocardiogram showed normal LVEF, aortic valve calcification, mild aortic regurgitation, severe mitral annular calcification, severe calcification of the mitral valve posterior leaflet, mild mitral valve regurgitation, and mild tricuspid regurgitation.  Myocardial perfusion study indicated a small sized, mildly severe area of ischemia in the mid to distal anterior wall consistent with an intermediate risk study.  Medical treatment was recommended with isosorbide 30 mg daily.    She presents today with her  accompanying her.  She is taking isosorbide 15 mg daily and her headaches have resolved.  She had COVID-19 virus a couple weeks ago so she is really not been exerting herself or exercising.  " This is a follow-up to reassess her chest pressure and shortness of breath and its hard to do so with COVID-19 muddying the browne.    She continues to experience some mild chest pressure that she feels has improved with isosorbide.  She experiences some occasional dyspnea with inclines.  She continues to feel more tired.  She is a \"golfer\" and its been hard to play the game and to keep up with her .  She denies PND, orthopnea, palpitations, edema, dizziness, syncope, or bleeding.  Her blood pressure is borderline elevated today.      Past Medical History:   Diagnosis Date   • Anemia    • Arrhythmia 20 yrs ago    Fast heartbeat take atenolol   • Arthritis    • Asthma As a child    Outgrew it as an adult   • Back pain    • Background diabetic retinopathy of right eye determined by examination ([facility])    • Bilateral carotid artery stenosis    • Coronary atherosclerosis     cath 1/2014 with 50% mid LAD, otherwise unremarkable   • COVID-19 7/15/2022   • Diabetes mellitus type 2, insulin dependent ([facility])    • Gastroparesis due to secondary diabetes ([facility])    • GERD (gastroesophageal reflux disease)    • History of spinal stenosis    • History of spondylolisthesis    • History of URI (upper respiratory infection)    • Hypercholesteremia    • Hyperlipidemia    • Hypertension    • Hypothyroidism Approx 2017   • Iron deficiency anemia    • Left-sided weakness     ARM AND LEG   • Leukocytosis     MILD   • Osteopenia    • Osteoporosis    • Peripheral neuropathy    • Postmenopausal osteoporosis 11/9/2017   • Retinopathy    • Vertigo    • Vitamin D deficiency        Past Surgical History:   Procedure Laterality Date   • BACK SURGERY  02/2015    L4-L5 laminectomy, L4-L5, L5-S1 fusion by Dr. Urbina   • CARDIAC CATHETERIZATION      ARTERIAL CATHETERIZATION   • CATARACT EXTRACTION     • CHOLECYSTECTOMY     • HYSTERECTOMY     • KNEE SURGERY Right 2009    Arthroscopy   • VAGINAL HYSTERECTOMY      fibroids, abnormal bleeding "       Social History     Socioeconomic History   • Marital status:      Spouse name: Yehuda   Tobacco Use   • Smoking status: Never Smoker   • Smokeless tobacco: Never Used   Vaping Use   • Vaping Use: Never used   Substance and Sexual Activity   • Alcohol use: Yes     Comment: rarely   • Drug use: No   • Sexual activity: Not Currently     Partners: Male     Birth control/protection: None       Family History   Problem Relation Age of Onset   • Polycythemia Mother         Progressed to secondary myelofibrosis   • Cancer Mother          of mylofibrosis. Has polycythemia & then mylo   • Hypertension Mother    • Hyperlipidemia Mother    • Cirrhosis Father    • Diabetes Other    • Hypertension Other    • Cancer Sister         Squamish cell on back   • Diabetes Maternal Grandfather    • Cancer Neg Hx    • Breast cancer Neg Hx    • Ovarian cancer Neg Hx    • Uterine cancer Neg Hx    • Colon cancer Neg Hx    • Deep vein thrombosis Neg Hx    • Pulmonary embolism Neg Hx        The following portion of the patient's history were reviewed and updated as appropriate: past medical history, past surgical history, past social history, past family history, allergies, current medications, and problem list.    Review of Systems   Constitutional: Positive for malaise/fatigue.   Cardiovascular: Positive for chest pain and dyspnea on exertion.   Respiratory: Negative.    Hematologic/Lymphatic: Negative.    Neurological: Negative.        Allergies   Allergen Reactions   • Topamax [Topiramate] Unknown - Low Severity         Current Outpatient Medications:   •  ALPRAZolam (XANAX) 0.25 MG tablet, 0.25 mg 3 (Three) Times a Day As Needed., Disp: , Rfl:   •  amLODIPine (NORVASC) 5 MG tablet, Take 5 mg by mouth Daily., Disp: , Rfl:   •  aspirin 81 MG tablet, Take 81 mg by mouth Daily., Disp: , Rfl:   •  atenolol (TENORMIN) 50 MG tablet, Take 1 tablet by mouth Daily., Disp: 30 tablet, Rfl: 6  •  atorvastatin (LIPITOR) 80 MG tablet,  "Take 80 mg by mouth 3 (Three) Times a Week., Disp: , Rfl:   •  ezetimibe (ZETIA) 10 MG tablet, Take 10 mg by mouth Daily., Disp: , Rfl:   •  fenofibrate (TRICOR) 145 MG tablet, Take 145 mg by mouth Daily., Disp: , Rfl:   •  Fluzone High-Dose Quadrivalent 0.7 ML suspension prefilled syringe, PHARMACY ADMINISTERED, Disp: , Rfl:   •  Insulin Degludec (TRESIBA FLEXTOUCH) 200 UNIT/ML solution pen-injector pen injection, Inject 16 Units under the skin into the appropriate area as directed every night at bedtime. 16 units SQ at bedtime, Disp: 2 pen, Rfl: 4  •  Insulin Pen Needle (BD PEN NEEDLE SOCO U/F) 32G X 4 MM misc, 4 times daily, Disp: 200 each, Rfl: 3  •  isosorbide mononitrate (IMDUR) 30 MG 24 hr tablet, Take 0.5 tablets by mouth Daily., Disp: 15 tablet, Rfl: 0  •  meloxicam (MOBIC) 15 MG tablet, Take 15 mg by mouth daily., Disp: , Rfl:   •  NovoLOG FlexPen 100 UNIT/ML solution pen-injector sc pen, 15 UNITS SQ TID WITH MEALS, Disp: 60 mL, Rfl: 1  •  ONETOUCH DELICA LANCETS 33G misc, , Disp: , Rfl:   •  SYNTHROID 50 MCG tablet, Take 1 tablet by mouth Daily., Disp: 30 tablet, Rfl: 6  •  vitamin D3 125 MCG (5000 UT) capsule capsule, Take 5,000 Units by mouth Daily., Disp: , Rfl:   •  zolpidem (AMBIEN) 10 MG tablet, Take 10 mg by mouth Every Night., Disp: , Rfl:         Objective:     Vitals:    07/26/22 0928   BP: 142/88   BP Location: Left arm   Patient Position: Sitting   Pulse: 68   Weight: 52.2 kg (115 lb)   Height: 152.4 cm (60\")     Body mass index is 22.46 kg/m².    PHYSICAL EXAM:    Vitals Reviewed.   General Appearance: No acute distress, well developed and well nourished.  Thin.  Eyes: Conjunctiva and lids: No erythema, swelling, or discharge. Sclera non-icteric. Glasses.   HENT: Atraumatic, normocephalic. External eyes, ears, and nose normal. No hearing loss noted. Mucous membranes normal. Lips not cyanotic. Neck supple with no tenderness. Wearing mask.   Respiratory: No signs of respiratory distress. " Respiration rhythm and depth normal.   Clear to auscultation. No rales, crackles, rhonchi, or wheezing auscultated.   Cardiovascular:  Jugular Venous Pressure: Normal  Heart Rate and Rhythm: Normal, Heart Sounds: Normal S1 and S2. No S3 or S4 noted.  Murmurs: No murmurs noted. No rubs, thrills, or gallops.   Lower Extremities: No edema noted.  Gastrointestinal:  Abdomen soft, non-distended, non-tender.    Musculoskeletal: Normal movement of extremities.  Skin and Nails: General appearance normal. No pallor, cyanosis, diaphoresis. Skin temperature normal. No clubbing of fingernails.   Psychiatric: Patient alert and oriented to person, place, and time. Speech and behavior appropriate. Normal mood and affect.       ECG 12 Lead    Date/Time: 7/26/2022 9:30 AM  Performed by: Luisa Avelar APRN  Authorized by: Luisa Avelar APRN   Comparison: compared with previous ECG from 7/15/2022  Similar to previous ECG  Rhythm: sinus rhythm  Rate: normal  BPM: 68  Conduction: conduction normal  ST Segments: ST segments normal  T Waves: T waves normal  QRS axis: normal  Other: no other findings    Clinical impression: normal ECG              Assessment:       Diagnosis Plan   1. Atherosclerosis of native coronary artery of native heart with stable angina pectoris (HCC)     2. Chest pressure     3. Dyspnea on exertion     4. Other fatigue     5. COVID-19     6. Essential hypertension     7. Mixed hyperlipidemia     8. Bilateral carotid artery stenosis            Plan:       1/2/3/4.  Coronary Artery Disease: Noted to have 50% LAD stenosis per cath in 2014.  She has a large burden of coronary artery calcification.  Recently having fatigue, dyspnea, and chest pain.  Recent Myoview stress test showed mildly severe area of ischemia in the mid to distal anterior wall.  She was started on isosorbide 30 mg daily and this was decreased to 50 mg daily because of headache.  She feels like the chest pressure has improved although she  continues to feel very fatigued and recently had COVID.  Her symptoms are quite vague today and I feel like the COVID-19 symptoms are making it hard to assess the clinical situation.  She will remain on isosorbide for another 6 weeks and reassess her exertional symptoms.  If she continues to have symptoms, we will most likely set her up for a heart catheterization.     5.  COVID-19: Diagnosed in January 2022.    6.  Hypertension: Blood pressure borderline elevated today.  I recommended that she monitor her blood pressure at home.  If her blood pressure is greater than 120s/80s, we will either increase amlodipine or add another antihypertensive agent on the next visit.    7.  Hyperlipidemia: With her large burden of calcium, I really think she would benefit from statin therapy daily for plaque stabilization.  She is having a repeat lipid panel completed this Friday at her PCP office and will defer to Dr. Domingo.    8.  Carotid Artery Stenosis: I requested the last office note from Dr. Sohail Burdick.  In May 2022, less than 50% stenosis on the right and 50-60% stenosis on the left; both unchanged.  Continue aspirin and statin.  She will follow-up with Dr. Burdick in 1 year.    9.  If she has any worsening symptoms or concerns, I asked her to call the office.  She will follow-up in 6 weeks with myself or Dr. Pacheco.    As always, it has been a pleasure to participate in your patient's care. Thank you.       Sincerely,         SHERI Dyer  [facility] Cardiology      · Dictated utilizing Dragon Dictation  · COVID-19 Precautions - Patient was compliant in wearing a mask. When I saw the patient, I used appropriate personal protective equipment (PPE) including mask and eye shield (standard procedure).  Additionally, I used gown and gloves if indicated.  Hand hygiene was completed before and after seeing the patient.  · I spent 30 minutes reviewing her medical records/testing/previous office  notes/labs, face-to-face interaction with patient, physical examination, formulating the plan of care, and discussion of plan of care with patient.

## 2022-09-09 ENCOUNTER — PROCEDURE VISIT (OUTPATIENT)
Dept: OBSTETRICS AND GYNECOLOGY | Age: 72
End: 2022-09-09

## 2022-09-09 ENCOUNTER — APPOINTMENT (OUTPATIENT)
Dept: WOMENS IMAGING | Facility: HOSPITAL | Age: 72
End: 2022-09-09

## 2022-09-09 DIAGNOSIS — Z12.31 VISIT FOR SCREENING MAMMOGRAM: Primary | ICD-10-CM

## 2022-09-09 PROCEDURE — 77063 BREAST TOMOSYNTHESIS BI: CPT | Performed by: RADIOLOGY

## 2022-09-09 PROCEDURE — 77063 BREAST TOMOSYNTHESIS BI: CPT | Performed by: STUDENT IN AN ORGANIZED HEALTH CARE EDUCATION/TRAINING PROGRAM

## 2022-09-09 PROCEDURE — 77067 SCR MAMMO BI INCL CAD: CPT | Performed by: RADIOLOGY

## 2022-09-09 PROCEDURE — 77067 SCR MAMMO BI INCL CAD: CPT | Performed by: STUDENT IN AN ORGANIZED HEALTH CARE EDUCATION/TRAINING PROGRAM

## 2022-09-15 DIAGNOSIS — R92.8 ABNORMAL MAMMOGRAM: Primary | ICD-10-CM

## 2022-09-20 ENCOUNTER — APPOINTMENT (OUTPATIENT)
Dept: WOMENS IMAGING | Facility: HOSPITAL | Age: 72
End: 2022-09-20

## 2022-09-20 PROCEDURE — G0279 TOMOSYNTHESIS, MAMMO: HCPCS | Performed by: RADIOLOGY

## 2022-09-20 PROCEDURE — 77065 DX MAMMO INCL CAD UNI: CPT | Performed by: RADIOLOGY

## 2022-09-22 ENCOUNTER — TELEPHONE (OUTPATIENT)
Dept: OBSTETRICS AND GYNECOLOGY | Age: 72
End: 2022-09-22

## 2022-09-22 NOTE — TELEPHONE ENCOUNTER
Zena from [facility] called stating pt needs right stereotactic biopsy, they are sending results today

## 2022-09-23 DIAGNOSIS — R92.8 ABNORMAL MAMMOGRAM: Primary | ICD-10-CM

## 2022-09-29 ENCOUNTER — APPOINTMENT (OUTPATIENT)
Dept: WOMENS IMAGING | Facility: HOSPITAL | Age: 72
End: 2022-09-29

## 2022-09-29 PROCEDURE — A4648 IMPLANTABLE TISSUE MARKER: HCPCS | Performed by: RADIOLOGY

## 2022-09-29 PROCEDURE — 88305 TISSUE EXAM BY PATHOLOGIST: CPT

## 2022-09-29 PROCEDURE — 19081 BX BREAST 1ST LESION STRTCTC: CPT | Performed by: RADIOLOGY

## 2022-10-28 ENCOUNTER — OFFICE VISIT (OUTPATIENT)
Dept: CARDIOLOGY | Facility: CLINIC | Age: 72
End: 2022-10-28

## 2022-10-28 VITALS
SYSTOLIC BLOOD PRESSURE: 136 MMHG | DIASTOLIC BLOOD PRESSURE: 72 MMHG | HEART RATE: 77 BPM | BODY MASS INDEX: 22.97 KG/M2 | WEIGHT: 117 LBS | HEIGHT: 60 IN

## 2022-10-28 DIAGNOSIS — I25.118 ATHEROSCLEROSIS OF NATIVE CORONARY ARTERY OF NATIVE HEART WITH STABLE ANGINA PECTORIS: Primary | ICD-10-CM

## 2022-10-28 DIAGNOSIS — Z79.4 CONTROLLED TYPE 2 DIABETES MELLITUS WITH COMPLICATION, WITH LONG-TERM CURRENT USE OF INSULIN: ICD-10-CM

## 2022-10-28 DIAGNOSIS — I65.23 BILATERAL CAROTID ARTERY STENOSIS: ICD-10-CM

## 2022-10-28 DIAGNOSIS — E78.2 MIXED HYPERLIPIDEMIA: ICD-10-CM

## 2022-10-28 DIAGNOSIS — I10 ESSENTIAL HYPERTENSION: ICD-10-CM

## 2022-10-28 DIAGNOSIS — E11.8 CONTROLLED TYPE 2 DIABETES MELLITUS WITH COMPLICATION, WITH LONG-TERM CURRENT USE OF INSULIN: ICD-10-CM

## 2022-10-28 DIAGNOSIS — U07.1 COVID-19: ICD-10-CM

## 2022-10-28 PROCEDURE — 99214 OFFICE O/P EST MOD 30 MIN: CPT | Performed by: INTERNAL MEDICINE

## 2022-10-28 NOTE — PROGRESS NOTES
Subjective:     Encounter Date:10/28/22      Patient ID: Shirin Washington is a 72 y.o. female.    Chief Complaint:  History of Present Illness    Dear Dr. Domingo,    I had the pleasure of seeing this patient in the office today.    Since her last visit she is doing much better.  She had COVID just before her last visit and so it was difficult to really assess symptoms at that time.  She says now that she has had a few months out that she is feeling better.  She is no longer having any the pressure discomfort she was having in her chest.  Still some shortness of breath this been present ever since she had COVID.  Still has a little bit of a cough but that is improving.  Still has some generalized fatigue that has been present ever since she had COVID.    Patient has been seen by Dr. Araujo in the past, most recent visit was 2017.  She had a CT calcium score at that time of 655 and she had a stress test performed that was unremarkable.  Prior to that she was seen in 2014 with symptoms concerning for unstable angina and had a cardiac catheterization that revealed diffuse extraluminal coronary artery calcification and a 50% lesion in the mid LAD.    She had a coronary artery calcium scan performed May 2022 with a total Agatston CAC score of 930.  This represents the 90th percentile.  Scoring was left main equals 5, LAD equals 395, left circumflex equals 122, RCA equals 480.  Calcification of the aortic valve was also noted.    On 6/29/2022, 2D echocardiogram showed normal LVEF, aortic valve calcification, mild aortic regurgitation, severe mitral annular calcification, severe calcification of the mitral valve posterior leaflet, mild mitral valve regurgitation, and mild tricuspid regurgitation.  Myocardial perfusion study indicated a small sized, mildly severe area of ischemia in the mid to distal anterior wall consistent with an intermediate risk study.  Medical treatment was recommended with isosorbide 30 mg  "daily.      The following portions of the patient's history were reviewed and updated as appropriate: allergies, current medications, past family history, past medical history, past social history, past surgical history and problem list.    Procedures       Objective:     Vitals:    10/28/22 0945   BP: 136/72   Pulse: 77   Weight: 53.1 kg (117 lb)   Height: 152.4 cm (60\")     Body mass index is 22.85 kg/m².      Vitals reviewed.   Constitutional:       General: Not in acute distress.     Appearance: Well-developed. Not diaphoretic.   Eyes:      General:         Right eye: No discharge.         Left eye: No discharge.      Conjunctiva/sclera: Conjunctivae normal.      Pupils: Pupils are equal, round, and reactive to light.   HENT:      Head: Normocephalic and atraumatic.      Nose: Nose normal.   Neck:      Thyroid: No thyromegaly.      Trachea: No tracheal deviation.      Lymphadenopathy: No cervical adenopathy.   Pulmonary:      Effort: Pulmonary effort is normal. No respiratory distress.      Breath sounds: Normal breath sounds. No stridor.   Chest:      Chest wall: Not tender to palpatation.   Cardiovascular:      Normal rate. Regular rhythm.      Murmurs: There is a grade 1/6 mid frequency midsystolic murmur at the URSB, radiating to the neck.      . No S3 gallop. No click. No rub.   Pulses:     Intact distal pulses.   Abdominal:      General: Bowel sounds are normal. There is no distension.      Palpations: Abdomen is soft. There is no abdominal mass.      Tenderness: There is no abdominal tenderness. There is no guarding or rebound.   Musculoskeletal: Normal range of motion.         General: No tenderness or deformity.      Cervical back: Normal range of motion and neck supple. Skin:     General: Skin is warm and dry.      Findings: No erythema or rash.   Neurological:      Mental Status: Alert and oriented to person, place, and time.      Deep Tendon Reflexes: Reflexes are normal and symmetric.   Psychiatric: "         Thought Content: Thought content normal.         Data and records reviewed:     Lab Results   Component Value Date    GLUCOSE 137 (H) 07/15/2022    BUN 23 07/15/2022    CREATININE 0.89 07/15/2022    EGFRIFNONA 60 (L) 05/11/2021    EGFRIFAFRI 82 02/04/2021    BCR 25.8 (H) 07/15/2022    K 3.8 07/15/2022    CO2 22.5 07/15/2022    CALCIUM 8.9 07/15/2022    PROTENTOTREF 6.1 02/04/2021    ALBUMIN 4.00 07/15/2022    LABIL2 1.9 02/04/2021    AST 33 (H) 07/15/2022    ALT 33 07/15/2022     No results found for: CHOL  Lab Results   Component Value Date    TRIG 115 02/04/2021    TRIG 100 07/04/2014     Lab Results   Component Value Date    HDL 47 02/04/2021    HDL 47 07/04/2014     Lab Results   Component Value Date    LDL 87 02/04/2021    LDL 88 07/04/2014     Lab Results   Component Value Date    VLDL 21 02/04/2021     No results found for: LDLHDL  CBC    CBC 11/16/21 5/17/22 7/15/22   WBC 7.65 7.35 9.00   RBC 4.79 4.62 4.66   Hemoglobin 13.5 13.1 13.4   Hematocrit 41.2 40.9 40.3   MCV 86.0 88.5 86.5   MCH 28.2 28.4 28.8   MCHC 32.8 32.0 33.3   RDW 13.3 14.1 12.9   Platelets 275 281 173           No radiology results for the last 90 days.  Results for orders placed during the hospital encounter of 06/29/22    Adult Transthoracic Echo Complete W/ Cont if Necessary Per Protocol    Interpretation Summary  · Calculated left ventricular EF = 53.8% Estimated left ventricular EF was in agreement with the calculated left ventricular EF. Left ventricular systolic function is normal.  · There is moderate calcification of the aortic valve.  · Severe mitral annular calcification is present. There is severe calcification of the mitral valve posterior leaflet(s).  · Mild aortic valve regurgitation is present.  · Mild mitral valve regurgitation is present.  · Mild tricuspid valve regurgitation is present.  · Estimated right ventricular systolic pressure from tricuspid regurgitation is normal (<35 mmHg). Calculated right ventricular  systolic pressure from tricuspid regurgitation is 21.4 mmHg.    Stress test June 2022:  Interpretation Summary    • Findings consistent with a normal ECG stress test.  • Left ventricular ejection fraction is hyperdynamic (Calculated EF > 70%). .  • Myocardial perfusion imaging indicates a small-sized, mildly severe area of ischemia located in the mid to distal anterior wall.  • Impressions are consistent with an intermediate risk study.  • There is no prior study available for comparison.        Assessment:         No diagnosis found.       Plan:       1.  Coronary artery disease- continue guideline directed medical therapy, no angina pectoris, small area of ischemia noted on the prior stress test  2.  Recent COVID-improving, less symptoms now.  3.  Mixed hyperlipidemia on lipid-lowering therapy, continue same  4.  Hypertension, blood pressure reasonably well controlled,     Thank you very much for allowing us to participate in the care of this pleasant patient.  Please don't hesitate to call if I can be of assistance in any way.      Current Outpatient Medications:   •  ALPRAZolam (XANAX) 0.25 MG tablet, 0.25 mg 3 (Three) Times a Day As Needed., Disp: , Rfl:   •  amLODIPine (NORVASC) 5 MG tablet, Take 5 mg by mouth Daily., Disp: , Rfl:   •  aspirin 81 MG tablet, Take 81 mg by mouth Daily., Disp: , Rfl:   •  atenolol (TENORMIN) 50 MG tablet, Take 1 tablet by mouth Daily., Disp: 30 tablet, Rfl: 6  •  atorvastatin (LIPITOR) 80 MG tablet, Take 1 tablet by mouth. 5 times weekly, Disp: , Rfl:   •  ezetimibe (ZETIA) 10 MG tablet, Take 10 mg by mouth Daily., Disp: , Rfl:   •  fenofibrate (TRICOR) 145 MG tablet, Take 145 mg by mouth Daily., Disp: , Rfl:   •  Fluzone High-Dose Quadrivalent 0.7 ML suspension prefilled syringe, PHARMACY ADMINISTERED, Disp: , Rfl:   •  Insulin Degludec (TRESIBA FLEXTOUCH) 200 UNIT/ML solution pen-injector pen injection, Inject 16 Units under the skin into the appropriate area as directed every  night at bedtime. 16 units SQ at bedtime, Disp: 2 pen, Rfl: 4  •  Insulin Pen Needle (BD PEN NEEDLE SOCO U/F) 32G X 4 MM misc, 4 times daily, Disp: 200 each, Rfl: 3  •  isosorbide mononitrate (IMDUR) 30 MG 24 hr tablet, Take 0.5 tablets by mouth Daily., Disp: 15 tablet, Rfl: 0  •  meloxicam (MOBIC) 15 MG tablet, Take 15 mg by mouth daily., Disp: , Rfl:   •  NovoLOG FlexPen 100 UNIT/ML solution pen-injector sc pen, 15 UNITS SQ TID WITH MEALS, Disp: 60 mL, Rfl: 1  •  ONETOUCH DELICA LANCETS 33G misc, , Disp: , Rfl:   •  SYNTHROID 50 MCG tablet, Take 1 tablet by mouth Daily., Disp: 30 tablet, Rfl: 6  •  vitamin D3 125 MCG (5000 UT) capsule capsule, Take 5,000 Units by mouth Daily., Disp: , Rfl:   •  zolpidem (AMBIEN) 10 MG tablet, Take 10 mg by mouth Every Night., Disp: , Rfl:          Return in about 1 year (around 10/28/2023).

## 2022-11-01 ENCOUNTER — LAB (OUTPATIENT)
Dept: LAB | Facility: HOSPITAL | Age: 72
End: 2022-11-01

## 2022-11-01 ENCOUNTER — OFFICE VISIT (OUTPATIENT)
Dept: ONCOLOGY | Facility: CLINIC | Age: 72
End: 2022-11-01

## 2022-11-01 VITALS
WEIGHT: 118.5 LBS | DIASTOLIC BLOOD PRESSURE: 70 MMHG | BODY MASS INDEX: 23.26 KG/M2 | HEART RATE: 68 BPM | HEIGHT: 60 IN | TEMPERATURE: 97.3 F | RESPIRATION RATE: 18 BRPM | OXYGEN SATURATION: 96 % | SYSTOLIC BLOOD PRESSURE: 147 MMHG

## 2022-11-01 DIAGNOSIS — E53.8 B12 DEFICIENCY: ICD-10-CM

## 2022-11-01 DIAGNOSIS — D50.9 IRON DEFICIENCY ANEMIA, UNSPECIFIED IRON DEFICIENCY ANEMIA TYPE: ICD-10-CM

## 2022-11-01 DIAGNOSIS — D64.9 NORMOCYTIC ANEMIA: Primary | ICD-10-CM

## 2022-11-01 LAB
ALBUMIN SERPL-MCNC: 3.7 G/DL (ref 3.5–5.2)
ALBUMIN/GLOB SERPL: 1.5 G/DL (ref 1.1–2.4)
ALP SERPL-CCNC: 33 U/L (ref 38–116)
ALT SERPL W P-5'-P-CCNC: 20 U/L (ref 0–33)
ANION GAP SERPL CALCULATED.3IONS-SCNC: 9.8 MMOL/L (ref 5–15)
AST SERPL-CCNC: 23 U/L (ref 0–32)
BASOPHILS # BLD AUTO: 0.08 10*3/MM3 (ref 0–0.2)
BASOPHILS NFR BLD AUTO: 0.9 % (ref 0–1.5)
BILIRUB SERPL-MCNC: 0.4 MG/DL (ref 0.2–1.2)
BUN SERPL-MCNC: 33 MG/DL (ref 6–20)
BUN/CREAT SERPL: 35.1 (ref 7.3–30)
CALCIUM SPEC-SCNC: 9.5 MG/DL (ref 8.5–10.2)
CHLORIDE SERPL-SCNC: 108 MMOL/L (ref 98–107)
CO2 SERPL-SCNC: 24.2 MMOL/L (ref 22–29)
CREAT SERPL-MCNC: 0.94 MG/DL (ref 0.6–1.1)
DEPRECATED RDW RBC AUTO: 48.2 FL (ref 37–54)
EGFRCR SERPLBLD CKD-EPI 2021: 63.8 ML/MIN/1.73
EOSINOPHIL # BLD AUTO: 0.63 10*3/MM3 (ref 0–0.4)
EOSINOPHIL NFR BLD AUTO: 7.3 % (ref 0.3–6.2)
ERYTHROCYTE [DISTWIDTH] IN BLOOD BY AUTOMATED COUNT: 15 % (ref 12.3–15.4)
FERRITIN SERPL-MCNC: 206.1 NG/ML (ref 13–150)
GLOBULIN UR ELPH-MCNC: 2.5 GM/DL (ref 1.8–3.5)
GLUCOSE SERPL-MCNC: 150 MG/DL (ref 74–124)
HCT VFR BLD AUTO: 35.6 % (ref 34–46.6)
HGB BLD-MCNC: 11.1 G/DL (ref 12–15.9)
HGB RETIC QN AUTO: 32.7 PG (ref 29.8–36.1)
IMM GRANULOCYTES # BLD AUTO: 0.06 10*3/MM3 (ref 0–0.05)
IMM GRANULOCYTES NFR BLD AUTO: 0.7 % (ref 0–0.5)
IMM RETICS NFR: 17.7 % (ref 3–15.8)
IRON 24H UR-MRATE: 54 MCG/DL (ref 37–145)
IRON SATN MFR SERPL: 12 % (ref 14–48)
LYMPHOCYTES # BLD AUTO: 1.77 10*3/MM3 (ref 0.7–3.1)
LYMPHOCYTES NFR BLD AUTO: 20.5 % (ref 19.6–45.3)
MCH RBC QN AUTO: 27.5 PG (ref 26.6–33)
MCHC RBC AUTO-ENTMCNC: 31.2 G/DL (ref 31.5–35.7)
MCV RBC AUTO: 88.1 FL (ref 79–97)
MONOCYTES # BLD AUTO: 0.59 10*3/MM3 (ref 0.1–0.9)
MONOCYTES NFR BLD AUTO: 6.8 % (ref 5–12)
NEUTROPHILS NFR BLD AUTO: 5.52 10*3/MM3 (ref 1.7–7)
NEUTROPHILS NFR BLD AUTO: 63.8 % (ref 42.7–76)
NRBC BLD AUTO-RTO: 0 /100 WBC (ref 0–0.2)
PLATELET # BLD AUTO: 273 10*3/MM3 (ref 140–450)
PMV BLD AUTO: 10.8 FL (ref 6–12)
POTASSIUM SERPL-SCNC: 4.4 MMOL/L (ref 3.5–4.7)
PROT SERPL-MCNC: 6.2 G/DL (ref 6.3–8)
RBC # BLD AUTO: 4.04 10*6/MM3 (ref 3.77–5.28)
RETICS # AUTO: 0.09 10*6/MM3 (ref 0.02–0.13)
RETICS/RBC NFR AUTO: 2.16 % (ref 0.7–1.9)
SODIUM SERPL-SCNC: 142 MMOL/L (ref 134–145)
TIBC SERPL-MCNC: 451 MCG/DL (ref 249–505)
TRANSFERRIN SERPL-MCNC: 322 MG/DL (ref 200–360)
VIT B12 BLD-MCNC: 299 PG/ML (ref 211–946)
WBC NRBC COR # BLD: 8.65 10*3/MM3 (ref 3.4–10.8)

## 2022-11-01 PROCEDURE — 84466 ASSAY OF TRANSFERRIN: CPT

## 2022-11-01 PROCEDURE — 80053 COMPREHEN METABOLIC PANEL: CPT

## 2022-11-01 PROCEDURE — 36415 COLL VENOUS BLD VENIPUNCTURE: CPT

## 2022-11-01 PROCEDURE — 83540 ASSAY OF IRON: CPT

## 2022-11-01 PROCEDURE — 99214 OFFICE O/P EST MOD 30 MIN: CPT | Performed by: INTERNAL MEDICINE

## 2022-11-01 PROCEDURE — 85025 COMPLETE CBC W/AUTO DIFF WBC: CPT

## 2022-11-01 PROCEDURE — 85046 RETICYTE/HGB CONCENTRATE: CPT

## 2022-11-01 PROCEDURE — 82607 VITAMIN B-12: CPT | Performed by: INTERNAL MEDICINE

## 2022-11-01 PROCEDURE — 82728 ASSAY OF FERRITIN: CPT

## 2022-11-01 RX ORDER — NITROFURANTOIN 25; 75 MG/1; MG/1
CAPSULE ORAL
COMMUNITY
Start: 2022-08-01 | End: 2022-12-07

## 2022-11-01 RX ORDER — TRIAMTERENE CAPSULES 50 MG/1
CAPSULE ORAL
COMMUNITY

## 2022-12-07 ENCOUNTER — OFFICE VISIT (OUTPATIENT)
Dept: OBSTETRICS AND GYNECOLOGY | Age: 72
End: 2022-12-07

## 2022-12-07 VITALS
WEIGHT: 116.6 LBS | HEIGHT: 60 IN | DIASTOLIC BLOOD PRESSURE: 72 MMHG | BODY MASS INDEX: 22.89 KG/M2 | SYSTOLIC BLOOD PRESSURE: 124 MMHG

## 2022-12-07 DIAGNOSIS — Z01.419 WELL WOMAN EXAM WITH ROUTINE GYNECOLOGICAL EXAM: Primary | ICD-10-CM

## 2022-12-07 PROBLEM — E78.5 HYPERLIPIDEMIA ASSOCIATED WITH TYPE 2 DIABETES MELLITUS (HCC): Status: RESOLVED | Noted: 2019-01-19 | Resolved: 2022-12-07

## 2022-12-07 PROBLEM — E11.69 HYPERLIPIDEMIA ASSOCIATED WITH TYPE 2 DIABETES MELLITUS: Status: RESOLVED | Noted: 2019-01-19 | Resolved: 2022-12-07

## 2022-12-07 PROCEDURE — G0101 CA SCREEN;PELVIC/BREAST EXAM: HCPCS | Performed by: STUDENT IN AN ORGANIZED HEALTH CARE EDUCATION/TRAINING PROGRAM

## 2022-12-07 NOTE — PROGRESS NOTES
[facility]   Obstetrics and Gynecology   Routine Annual Visit    2022    Patient: Shirin Washington          MR#:[id number]    History of Present Illness    Chief Complaint   Patient presents with   • Annual Exam     AE today, Last AE 2020, Mg 2022, DEXA 2021 osteopenia and has Prolia injections, Hx hysterectomy - has ovaries, No problems today       72 y.o. female  s/p TVH with ovarian conservation who presents for annual exam.  No complaints.    -H/o osteoporosis, takes Prolia q 6mo, last DEXA  with T score -1.1, PCP was ordering labs and sending to [facility] but patient wondering if this could all be done same place, sees Endocrine for DM so could try there  -Mammo Stereotactic Breast Biopsy Initial With & Without Device Right (2022) - Abnormal mammo this year, bx c/w fat necrosis, repeat mammo 1 yr  -Colonoscopy due next yr, last one 9yrs ago by Dr. Joanna Ospina normal    Obstetric History:  OB History        2    Para   2    Term   2            AB        Living   2       SAB        IAB        Ectopic        Molar        Multiple        Live Births   2               Menstrual History:     No LMP recorded. Patient has had a hysterectomy.       Sexual History:   Not sexually active      Social History:    with prostate cancer, s/p radiation    ________________________________________  Patient Active Problem List   Diagnosis   • Controlled type 2 diabetes mellitus with complication, with long-term current use of insulin (HCC)   • Essential hypertension   • HLD (hyperlipidemia)   • Iron deficiency anemia   • Coronary atherosclerosis   • Osteoporosis   • Adverse effect of iron   • B12 deficiency   • Osteoporosis, post-menopausal   • Hypothyroidism   • Primary insomnia   • LVH (left ventricular hypertrophy)   • COVID-19   • Bilateral carotid artery stenosis     Past Medical History:   Diagnosis Date   • Abnormal ECG ?   • Anemia     • Arrhythmia 20 yrs ago    Fast heartbeat take atenolol   • Arthritis    • Asthma As a child    Outgrew it as an adult   • Back pain    • Background diabetic retinopathy of right eye determined by examination ([facility])    • Bilateral carotid artery stenosis    • Coronary atherosclerosis     cath 2014 with 50% mid LAD, otherwise unremarkable   • COVID-19 07/15/2022   • Diabetes mellitus type 2, insulin dependent ([facility])    • Gastroparesis due to secondary diabetes ([facility])    • GERD (gastroesophageal reflux disease)    • History of spinal stenosis    • History of spondylolisthesis    • History of URI (upper respiratory infection)    • Hypercholesteremia    • Hyperlipidemia    • Hypertension    • Hypothyroidism Approx    • Iron deficiency anemia    • Left-sided weakness     ARM AND LEG   • Leukocytosis     MILD   • Peripheral neuropathy    • Postmenopausal osteoporosis 2017   • Retinopathy    • Vertigo    • Vitamin D deficiency      Past Surgical History:   Procedure Laterality Date   • BACK SURGERY  2015    L4-L5 laminectomy, L4-L5, L5-S1 fusion by Dr. Urbina   • CARDIAC CATHETERIZATION      ARTERIAL CATHETERIZATION   • CATARACT EXTRACTION     • CHOLECYSTECTOMY     • KNEE SURGERY Right     Arthroscopy   • VAGINAL HYSTERECTOMY      fibroids, abnormal bleeding     Social History     Tobacco Use   Smoking Status Never   Smokeless Tobacco Never     Family History   Problem Relation Age of Onset   • Polycythemia Mother         Progressed to secondary myelofibrosis   • Cancer Mother          of mylofibrosis. Has polycythemia & then mylo   • Hypertension Mother    • Hyperlipidemia Mother    • Cirrhosis Father    • Diabetes Other    • Hypertension Other    • Cancer Sister         Squamish cell on back   • Diabetes Maternal Grandfather    • Asthma Paternal Grandfather    • Cancer Neg Hx    • Breast cancer Neg Hx    • Ovarian cancer Neg Hx    • Uterine cancer Neg Hx    • Colon cancer Neg Hx    • Deep vein  thrombosis Neg Hx    • Pulmonary embolism Neg Hx      Prior to Admission medications    Medication Sig Start Date End Date Taking? Authorizing Provider   ALPRAZolam (XANAX) 0.25 MG tablet 0.25 mg 3 (Three) Times a Day As Needed. 11/25/16  Yes Melissa Philippe MD   amLODIPine (NORVASC) 5 MG tablet Take 5 mg by mouth Daily. 4/22/22  Yes Melissa Philippe MD   aspirin 81 MG tablet Take 81 mg by mouth Daily. 4/1/19  Yes Melissa Philippe MD   atenolol (TENORMIN) 50 MG tablet Take 1 tablet by mouth Daily. 5/1/17  Yes Tomás Araujo MD   atorvastatin (LIPITOR) 80 MG tablet Take 1 tablet by mouth. 5 times weekly 6/10/19  Yes Melissa Philippe MD   ezetimibe (ZETIA) 10 MG tablet Take 10 mg by mouth Daily. 9/22/21  Yes Melissa Philippe MD   fenofibrate (TRICOR) 145 MG tablet Take 145 mg by mouth Daily. 1/3/13  Yes Melissa Philippe MD   Fluzone High-Dose Quadrivalent 0.7 ML suspension prefilled syringe PHARMACY ADMINISTERED 10/5/20  Yes Melissa Philippe MD   Insulin Degludec (TRESIBA FLEXTOUCH) 200 UNIT/ML solution pen-injector pen injection Inject 16 Units under the skin into the appropriate area as directed every night at bedtime. 16 units SQ at bedtime 10/11/21  Yes Graciela Amador APRN   Insulin Pen Needle (BD PEN NEEDLE SOCO U/F) 32G X 4 MM misc 4 times daily 4/25/16  Yes Frank Aleln MD   isosorbide mononitrate (IMDUR) 30 MG 24 hr tablet Take 0.5 tablets by mouth Daily. 7/14/22  Yes Luisa Avelar APRN   meloxicam (MOBIC) 15 MG tablet Take 15 mg by mouth daily.   Yes Melissa Philippe MD   NovoLOG FlexPen 100 UNIT/ML solution pen-injector sc pen 15 UNITS SQ TID WITH MEALS 2/21/22  Yes Gracieal Amador APRN   ONETOUCH DELICA LANCETS 33G misc  7/29/15  Yes Melissa Philippe MD   SYNTHROID 50 MCG tablet Take 1 tablet by mouth Daily. 9/12/18  Yes Frank Allen MD   triamterene (DYRENIUM) 50 MG capsule    Yes Provider, MD Melissa   vitamin D3 125 MCG (5000  "UT) capsule capsule Take 5,000 Units by mouth Daily.   Yes Melissa Philippe MD   zolpidem (AMBIEN) 10 MG tablet Take 10 mg by mouth Every Night. 2/8/13  Yes Melissa Philippe MD   Cholecalciferol 10 MCG (400 UNIT) capsule   12/7/22  Melissa Philippe MD   nitrofurantoin, macrocrystal-monohydrate, (MACROBID) 100 MG capsule  8/1/22 12/7/22  Melissa Philippe MD     ________________________________________    Current contraception: status post hysterectomy  History of abnormal Pap smear: no  Family history of uterine or ovarian cancer: no  Family History of colon cancer/colon polyps: no  History of abnormal mammogram: yes - see above    The following portions of the patient's history were reviewed and updated as appropriate: allergies, current medications, past family history, past medical history, past social history, past surgical history and problem list.    Review of Systems   All other systems reviewed and are negative.           Objective     /72   Ht 152.4 cm (60\")   Wt 52.9 kg (116 lb 9.6 oz)   Breastfeeding No   BMI 22.77 kg/m²    BP Readings from Last 3 Encounters:   12/07/22 124/72   11/01/22 147/70   10/28/22 136/72      Wt Readings from Last 3 Encounters:   12/07/22 52.9 kg (116 lb 9.6 oz)   11/01/22 53.8 kg (118 lb 8 oz)   10/28/22 53.1 kg (117 lb)        BMI: Estimated body mass index is 22.77 kg/m² as calculated from the following:    Height as of this encounter: 152.4 cm (60\").    Weight as of this encounter: 52.9 kg (116 lb 9.6 oz).    Physical Exam  Vitals and nursing note reviewed.   Constitutional:       General: She is not in acute distress.     Appearance: Normal appearance.   HENT:      Head: Normocephalic and atraumatic.   Eyes:      Extraocular Movements: Extraocular movements intact.   Cardiovascular:      Rate and Rhythm: Normal rate and regular rhythm.      Pulses: Normal pulses.      Heart sounds: No murmur heard.  Pulmonary:      Effort: Pulmonary effort is " normal. No respiratory distress.      Breath sounds: Normal breath sounds.   Chest:   Breasts:     Right: Normal. No mass, nipple discharge, skin change or tenderness.      Left: Normal. No mass, nipple discharge, skin change or tenderness.   Abdominal:      General: There is no distension.      Palpations: Abdomen is soft. There is no mass.      Tenderness: There is no abdominal tenderness.   Genitourinary:     General: Normal vulva.      Labia:         Right: No rash or lesion.         Left: No rash or lesion.       Urethra: No prolapse, urethral swelling or urethral lesion.      Vagina: Normal.      Uterus: Absent.       Adnexa: Right adnexa normal and left adnexa normal.      Rectum: Normal.      Comments: Bladder: no masses or tenderness  Perineum/Anus: no masses, lesions, or skin changes  Musculoskeletal:         General: No swelling. Normal range of motion.      Cervical back: Normal range of motion.   Lymphadenopathy:      Upper Body:      Right upper body: No axillary adenopathy.      Left upper body: No axillary adenopathy.   Skin:     General: Skin is warm and dry.   Neurological:      General: No focal deficit present.      Mental Status: She is alert and oriented to person, place, and time.   Psychiatric:         Mood and Affect: Mood normal.         Behavior: Behavior normal.         As part of wellness and prevention, the following topics were discussed with the patient:  Encouraged self breast exam  Physical activity and regular exercised encouraged.   Injury prevention discussed.  Healthy weight discussed.  Nutrition discussed.  Substance abuse/misuse discussed.  Sexual behavior/safe practices discussed.   Mental health discussed.           Assessment:  Diagnoses and all orders for this visit:    1. Well woman exam with routine gynecological exam (Primary)    -Breast, pelvic, and rectal exam normal  - No need for Paps  - Not sexually active  - Mammogram up-to-date, repeat 10/2023  - Colonoscopy  up-to-date, repeat next year  - Discussed osteoporosis management with Prolia.  Last DEXA scan with improvement to osteopenia.  Currently being managed by PCP but has labs drawn at their office and injection at [facility] so is wondering if she can consolidate the somewhere.  Recommended she discuss transferring management to her endocrinologist.      Plan:  Return for schedule mammo 10/2023 and annual 12/2023.      Karina Soriano MD  12/7/2022 10:40 EST

## 2022-12-20 DIAGNOSIS — M81.0 AGE-RELATED OSTEOPOROSIS WITHOUT CURRENT PATHOLOGICAL FRACTURE: Primary | ICD-10-CM

## 2022-12-22 ENCOUNTER — INFUSION (OUTPATIENT)
Dept: ONCOLOGY | Facility: HOSPITAL | Age: 72
End: 2022-12-22

## 2022-12-22 ENCOUNTER — APPOINTMENT (OUTPATIENT)
Dept: OTHER | Facility: HOSPITAL | Age: 72
End: 2022-12-22

## 2022-12-22 VITALS — RESPIRATION RATE: 18 BRPM | TEMPERATURE: 97.1 F | HEIGHT: 60 IN | BODY MASS INDEX: 22.77 KG/M2

## 2022-12-22 DIAGNOSIS — M81.0 OSTEOPOROSIS, UNSPECIFIED OSTEOPOROSIS TYPE, UNSPECIFIED PATHOLOGICAL FRACTURE PRESENCE: ICD-10-CM

## 2022-12-22 DIAGNOSIS — M81.0 OSTEOPOROSIS, POST-MENOPAUSAL: Primary | ICD-10-CM

## 2022-12-22 PROCEDURE — 25010000002 DENOSUMAB 60 MG/ML SOLUTION PREFILLED SYRINGE: Performed by: INTERNAL MEDICINE

## 2022-12-22 PROCEDURE — 96372 THER/PROPH/DIAG INJ SC/IM: CPT

## 2022-12-22 RX ADMIN — DENOSUMAB 60 MG: 60 INJECTION SUBCUTANEOUS at 13:56

## 2022-12-22 NOTE — NURSING NOTE
Arrived  for prolia injection. Indication and side effects reviewed. Denies recent dental work. Labs and medications verified. Prolia administered in  arm without incidence. Instructed to call prescribing MD for any concerns or questions and instructed on how to schedule future appts.  Pt vu and discharged in stable condition.

## 2023-01-03 DIAGNOSIS — Z79.4 CONTROLLED TYPE 2 DIABETES MELLITUS WITH COMPLICATION, WITH LONG-TERM CURRENT USE OF INSULIN: ICD-10-CM

## 2023-01-03 DIAGNOSIS — E11.8 CONTROLLED TYPE 2 DIABETES MELLITUS WITH COMPLICATION, WITH LONG-TERM CURRENT USE OF INSULIN: ICD-10-CM

## 2023-01-03 RX ORDER — INSULIN DEGLUDEC 200 U/ML
16 INJECTION, SOLUTION SUBCUTANEOUS NIGHTLY
Qty: 7.2 ML | Refills: 1 | OUTPATIENT
Start: 2023-01-03 | End: 2023-04-03

## 2023-01-03 NOTE — TELEPHONE ENCOUNTER
Insulin Protocol Failed 01/03/2023 06:02 AM   Protocol Details  Lipid profile in past 12 months    Recent or future visit with prescriber (6M/30D)    A1c in past 6 months

## 2023-04-18 ENCOUNTER — TRANSCRIBE ORDERS (OUTPATIENT)
Dept: ADMINISTRATIVE | Facility: HOSPITAL | Age: 73
End: 2023-04-18
Payer: MEDICARE

## 2023-04-18 DIAGNOSIS — K31.84 GASTROPARESIS: Primary | ICD-10-CM

## 2023-05-01 NOTE — PROGRESS NOTES
"[facility] OUTPATIENT FOLLOW UP CLINIC VISIT    REASON FOR FOLLOW-UP:    1.  Iron deficiency anemia.  Her last intravenous Feraheme infusions or in October 2015.  Injectafer administered on 1/4/2019.  Recurrent iron deficiency with 2 doses of Injectafer on 8/26/2020 and 9/2/2020.    HISTORY OF PRESENT ILLNESS:  Shirin Washington is a 72 y.o. female who returns today for follow up of the above issue.    She continues to have back pain.  She has had a couple of steroid injections and also had a Medrol Dosepak recently.  No fevers or chills.  Gastroparesis still bothers her with early satiety and abdominal discomfort.      REVIEW OF SYSTEMS:  See the HPI    Vitals:    05/02/23 1252   BP: 171/71   Pulse: 66   Resp: 18   Temp: 98.2 °F (36.8 °C)   TempSrc: Temporal   SpO2: 96%   Weight: 54 kg (119 lb)   Height: 152.4 cm (60\")   PainSc: 0-No pain       PHYSICAL EXAMINATION:  GENERAL:  Well-developed well-nourished female; awake, alert and oriented, in no acute distress.  Wearing a facemask.  Lymphatics: No cervical supraclavicular or axillary adenopathy palpable again today  Chest: Lungs clear to auscultation bilaterally with excellent air movement  Heart: No murmurs gallops or rubs.  Regular rate and rhythm.  Extremities: Warm and well perfused with no clubbing cyanosis or edema visible today    DIAGNOSTIC DATA:  Retic With IRF & RET-He (05/02/2023 12:19)  CBC & Differential (05/02/2023 12:19)      ASSESSMENT:  This is a 72 y.o. female with:    *History of iron deficiency anemia: She required intravenous Feraheme last in October 2015 and we gave 2 more doses in September and October 2017.  No obvious GI blood loss.  I suspect she has some malabsorption.  She has symptoms consistent with gastroparesis.  She also received vitamin B12 injections in the past and she received 2 injections with her Feraheme infusions.  At the end of 2018 her ferritin had dropped considerably.  Therefore, we proceeded with one dose " of Injectafer.  · Ferritin was 23 with a hgb of 11.8 on 8/19/2020. She received two doses of IV Injectafer on 8/26 and 9/2/2020.  · Labs 10/28/2020 with hgb 13.3, ferritin 897, iron 109 with 28% iron saturation. B12 425.    · 5/11/2021: Hemoglobin 12.9, hematocrit 39.8%, MCV 88.1  · 11/16/2021: Hemoglobin normal at 13.5, normal MCV at 86.  · 5/17/2022: Hemoglobin remains normal at 13.1 with a normal MCV at 88.5.  Iron studies and ferritin pending.  · 11/1/2022: Hemoglobin a little bit lower at 11.1 with a normal MCV of 88.1.  Reticulocytes normal at 2.16% with a normal reticulated hemoglobin.  · 5/2/2023: Hemoglobin improved at 11.8    *Gastroparesis: Due to diabetes.  Stable but persistent symptoms.  Gastric emptying study scheduled in the near future.    *B12 deficiency: Vitamin B12 level pending today but has been normal    *Chronic back pain: She is getting steroid injections    *Leukocytosis with neutrophilia  · White blood cell count is 14,000 today likely due to steroid injections and oral steroids as well.    PLAN:  1. Follow-up pending labs from today  2. IV iron if needed for worsening iron deficiency, not needed at this time  3. Follow-up in 6 months with labs

## 2023-05-02 ENCOUNTER — LAB (OUTPATIENT)
Dept: LAB | Facility: HOSPITAL | Age: 73
End: 2023-05-02
Payer: MEDICARE

## 2023-05-02 ENCOUNTER — OFFICE VISIT (OUTPATIENT)
Dept: ONCOLOGY | Facility: CLINIC | Age: 73
End: 2023-05-02
Payer: MEDICARE

## 2023-05-02 VITALS
DIASTOLIC BLOOD PRESSURE: 71 MMHG | HEART RATE: 66 BPM | BODY MASS INDEX: 23.36 KG/M2 | SYSTOLIC BLOOD PRESSURE: 171 MMHG | TEMPERATURE: 98.2 F | RESPIRATION RATE: 18 BRPM | OXYGEN SATURATION: 96 % | HEIGHT: 60 IN | WEIGHT: 119 LBS

## 2023-05-02 DIAGNOSIS — D64.9 NORMOCYTIC ANEMIA: Primary | ICD-10-CM

## 2023-05-02 DIAGNOSIS — D64.9 NORMOCYTIC ANEMIA: ICD-10-CM

## 2023-05-02 DIAGNOSIS — E53.8 B12 DEFICIENCY: ICD-10-CM

## 2023-05-02 LAB
ALBUMIN SERPL-MCNC: 4.2 G/DL (ref 3.5–5.2)
ALBUMIN/GLOB SERPL: 1.8 G/DL (ref 1.1–2.4)
ALP SERPL-CCNC: 39 U/L (ref 38–116)
ALT SERPL W P-5'-P-CCNC: 16 U/L (ref 0–33)
ANION GAP SERPL CALCULATED.3IONS-SCNC: 13.6 MMOL/L (ref 5–15)
AST SERPL-CCNC: 18 U/L (ref 0–32)
BASOPHILS # BLD AUTO: 0.06 10*3/MM3 (ref 0–0.2)
BASOPHILS NFR BLD AUTO: 0.4 % (ref 0–1.5)
BILIRUB SERPL-MCNC: 0.7 MG/DL (ref 0.2–1.2)
BUN SERPL-MCNC: 31 MG/DL (ref 6–20)
BUN/CREAT SERPL: 34.1 (ref 7.3–30)
CALCIUM SPEC-SCNC: 10.5 MG/DL (ref 8.5–10.2)
CHLORIDE SERPL-SCNC: 102 MMOL/L (ref 98–107)
CO2 SERPL-SCNC: 22.4 MMOL/L (ref 22–29)
CREAT SERPL-MCNC: 0.91 MG/DL (ref 0.6–1.1)
DEPRECATED RDW RBC AUTO: 52 FL (ref 37–54)
EGFRCR SERPLBLD CKD-EPI 2021: 66.3 ML/MIN/1.73
EOSINOPHIL # BLD AUTO: 0.26 10*3/MM3 (ref 0–0.4)
EOSINOPHIL NFR BLD AUTO: 1.9 % (ref 0.3–6.2)
ERYTHROCYTE [DISTWIDTH] IN BLOOD BY AUTOMATED COUNT: 16.4 % (ref 12.3–15.4)
FERRITIN SERPL-MCNC: 76 NG/ML (ref 13–150)
GLOBULIN UR ELPH-MCNC: 2.4 GM/DL (ref 1.8–3.5)
GLUCOSE SERPL-MCNC: 137 MG/DL (ref 74–124)
HCT VFR BLD AUTO: 36.9 % (ref 34–46.6)
HGB BLD-MCNC: 11.8 G/DL (ref 12–15.9)
HGB RETIC QN AUTO: 33 PG (ref 29.8–36.1)
IMM GRANULOCYTES # BLD AUTO: 0.13 10*3/MM3 (ref 0–0.05)
IMM GRANULOCYTES NFR BLD AUTO: 0.9 % (ref 0–0.5)
IMM RETICS NFR: 18.6 % (ref 3–15.8)
IRON 24H UR-MRATE: 65 MCG/DL (ref 37–145)
IRON SATN MFR SERPL: 12 % (ref 14–48)
LYMPHOCYTES # BLD AUTO: 1.64 10*3/MM3 (ref 0.7–3.1)
LYMPHOCYTES NFR BLD AUTO: 11.7 % (ref 19.6–45.3)
MCH RBC QN AUTO: 27.9 PG (ref 26.6–33)
MCHC RBC AUTO-ENTMCNC: 32 G/DL (ref 31.5–35.7)
MCV RBC AUTO: 87.2 FL (ref 79–97)
MONOCYTES # BLD AUTO: 1.13 10*3/MM3 (ref 0.1–0.9)
MONOCYTES NFR BLD AUTO: 8.1 % (ref 5–12)
NEUTROPHILS NFR BLD AUTO: 10.78 10*3/MM3 (ref 1.7–7)
NEUTROPHILS NFR BLD AUTO: 77 % (ref 42.7–76)
NRBC BLD AUTO-RTO: 0 /100 WBC (ref 0–0.2)
PLATELET # BLD AUTO: 305 10*3/MM3 (ref 140–450)
PMV BLD AUTO: 10.6 FL (ref 6–12)
POTASSIUM SERPL-SCNC: 4.2 MMOL/L (ref 3.5–4.7)
PROT SERPL-MCNC: 6.6 G/DL (ref 6.3–8)
RBC # BLD AUTO: 4.23 10*6/MM3 (ref 3.77–5.28)
RETICS # AUTO: 0.14 10*6/MM3 (ref 0.02–0.13)
RETICS/RBC NFR AUTO: 3.28 % (ref 0.7–1.9)
SODIUM SERPL-SCNC: 138 MMOL/L (ref 134–145)
TIBC SERPL-MCNC: 556 MCG/DL (ref 249–505)
TRANSFERRIN SERPL-MCNC: 397 MG/DL (ref 200–360)
VIT B12 BLD-MCNC: 411 PG/ML (ref 211–946)
WBC NRBC COR # BLD: 14 10*3/MM3 (ref 3.4–10.8)

## 2023-05-02 PROCEDURE — 3078F DIAST BP <80 MM HG: CPT | Performed by: INTERNAL MEDICINE

## 2023-05-02 PROCEDURE — 36415 COLL VENOUS BLD VENIPUNCTURE: CPT

## 2023-05-02 PROCEDURE — 80053 COMPREHEN METABOLIC PANEL: CPT

## 2023-05-02 PROCEDURE — 1126F AMNT PAIN NOTED NONE PRSNT: CPT | Performed by: INTERNAL MEDICINE

## 2023-05-02 PROCEDURE — 1159F MED LIST DOCD IN RCRD: CPT | Performed by: INTERNAL MEDICINE

## 2023-05-02 PROCEDURE — 85046 RETICYTE/HGB CONCENTRATE: CPT

## 2023-05-02 PROCEDURE — 82607 VITAMIN B-12: CPT | Performed by: INTERNAL MEDICINE

## 2023-05-02 PROCEDURE — 83540 ASSAY OF IRON: CPT

## 2023-05-02 PROCEDURE — 3077F SYST BP >= 140 MM HG: CPT | Performed by: INTERNAL MEDICINE

## 2023-05-02 PROCEDURE — 1160F RVW MEDS BY RX/DR IN RCRD: CPT | Performed by: INTERNAL MEDICINE

## 2023-05-02 PROCEDURE — 99214 OFFICE O/P EST MOD 30 MIN: CPT | Performed by: INTERNAL MEDICINE

## 2023-05-02 PROCEDURE — 85025 COMPLETE CBC W/AUTO DIFF WBC: CPT

## 2023-05-02 PROCEDURE — 84466 ASSAY OF TRANSFERRIN: CPT

## 2023-05-02 PROCEDURE — 82728 ASSAY OF FERRITIN: CPT

## 2023-05-04 ENCOUNTER — TELEPHONE (OUTPATIENT)
Dept: ONCOLOGY | Facility: CLINIC | Age: 73
End: 2023-05-04
Payer: MEDICARE

## 2023-05-04 DIAGNOSIS — D50.9 IRON DEFICIENCY ANEMIA, UNSPECIFIED IRON DEFICIENCY ANEMIA TYPE: ICD-10-CM

## 2023-05-04 DIAGNOSIS — E53.8 B12 DEFICIENCY: Primary | ICD-10-CM

## 2023-05-04 RX ORDER — ACETAMINOPHEN 325 MG/1
650 TABLET ORAL ONCE
OUTPATIENT
Start: 2023-05-11

## 2023-05-04 RX ORDER — SODIUM CHLORIDE 9 MG/ML
250 INJECTION, SOLUTION INTRAVENOUS ONCE
OUTPATIENT
Start: 2023-05-11

## 2023-05-04 RX ORDER — DIPHENHYDRAMINE HCL 25 MG
25 CAPSULE ORAL ONCE
OUTPATIENT
Start: 2023-05-11

## 2023-05-04 NOTE — PROGRESS NOTES
Iron is dropping. I think she would qualify for more IV iron, correct? I sent her the lab result. Please f/u with her to see if she's interested in IV iron. Also, her Ca is a little high. Please see if she's on a supplement or eating a lot of Ca rich food. ANANTH

## 2023-05-04 NOTE — TELEPHONE ENCOUNTER
Pt informed she will need IV iron. Pt takes a calcium supplement daily. Pt receives Prolia Q6 months. She will decrease her calcium supplement and reach out to her PCP regarding Prolia. She is unsure if he wants her to continue the prolia injections. Pt V/U.

## 2023-05-04 NOTE — TELEPHONE ENCOUNTER
----- Message from Axel Lobato MD sent at 5/3/2023 10:17 PM EDT -----  Iron is dropping. I think she would qualify for more IV iron, correct? I sent her the lab result. Please f/u with her to see if she's interested in IV iron. Also, her Ca is a little high. Please see if she's on a supplement or eating a lot of Ca rich   food. ANANTH

## 2023-05-04 NOTE — TELEPHONE ENCOUNTER
----- Message from Lynn Tidwell RN sent at 5/4/2023 10:54 AM EDT -----  Injectafer x 2 doses thanks

## 2023-05-12 ENCOUNTER — INFUSION (OUTPATIENT)
Dept: ONCOLOGY | Facility: HOSPITAL | Age: 73
End: 2023-05-12
Payer: MEDICARE

## 2023-05-12 VITALS
WEIGHT: 121.4 LBS | BODY MASS INDEX: 23.71 KG/M2 | SYSTOLIC BLOOD PRESSURE: 174 MMHG | OXYGEN SATURATION: 96 % | DIASTOLIC BLOOD PRESSURE: 70 MMHG | TEMPERATURE: 98.4 F | HEART RATE: 77 BPM | RESPIRATION RATE: 18 BRPM

## 2023-05-12 DIAGNOSIS — D50.9 IRON DEFICIENCY ANEMIA, UNSPECIFIED IRON DEFICIENCY ANEMIA TYPE: ICD-10-CM

## 2023-05-12 DIAGNOSIS — E53.8 B12 DEFICIENCY: Primary | ICD-10-CM

## 2023-05-12 PROCEDURE — A9270 NON-COVERED ITEM OR SERVICE: HCPCS | Performed by: INTERNAL MEDICINE

## 2023-05-12 PROCEDURE — 96374 THER/PROPH/DIAG INJ IV PUSH: CPT

## 2023-05-12 PROCEDURE — 63710000001 PROCHLORPERAZINE MALEATE PER 10 MG: Performed by: INTERNAL MEDICINE

## 2023-05-12 PROCEDURE — 25010000002 FERRIC CARBOXYMALTOSE 750 MG/15ML SOLUTION 15 ML VIAL: Performed by: INTERNAL MEDICINE

## 2023-05-12 RX ORDER — SODIUM CHLORIDE 9 MG/ML
250 INJECTION, SOLUTION INTRAVENOUS ONCE
Status: COMPLETED | OUTPATIENT
Start: 2023-05-12 | End: 2023-05-12

## 2023-05-12 RX ORDER — DIPHENHYDRAMINE HCL 25 MG
25 CAPSULE ORAL ONCE
Status: DISCONTINUED | OUTPATIENT
Start: 2023-05-12 | End: 2023-05-12 | Stop reason: HOSPADM

## 2023-05-12 RX ORDER — PROCHLORPERAZINE MALEATE 10 MG
10 TABLET ORAL ONCE
Status: COMPLETED | OUTPATIENT
Start: 2023-05-12 | End: 2023-05-12

## 2023-05-12 RX ORDER — ACETAMINOPHEN 325 MG/1
650 TABLET ORAL ONCE
Status: DISCONTINUED | OUTPATIENT
Start: 2023-05-12 | End: 2023-05-12 | Stop reason: HOSPADM

## 2023-05-12 RX ADMIN — SODIUM CHLORIDE 250 ML: 9 INJECTION, SOLUTION INTRAVENOUS at 15:14

## 2023-05-12 RX ADMIN — PROCHLORPERAZINE MALEATE 10 MG: 10 TABLET ORAL at 15:52

## 2023-05-12 RX ADMIN — FERRIC CARBOXYMALTOSE INJECTION 750 MG: 50 INJECTION, SOLUTION INTRAVENOUS at 15:15

## 2023-05-19 ENCOUNTER — INFUSION (OUTPATIENT)
Dept: ONCOLOGY | Facility: HOSPITAL | Age: 73
End: 2023-05-19
Payer: MEDICARE

## 2023-05-19 VITALS
SYSTOLIC BLOOD PRESSURE: 145 MMHG | RESPIRATION RATE: 16 BRPM | HEART RATE: 69 BPM | TEMPERATURE: 98.2 F | WEIGHT: 120.6 LBS | OXYGEN SATURATION: 98 % | DIASTOLIC BLOOD PRESSURE: 68 MMHG | BODY MASS INDEX: 23.55 KG/M2

## 2023-05-19 DIAGNOSIS — E53.8 B12 DEFICIENCY: Primary | ICD-10-CM

## 2023-05-19 DIAGNOSIS — D50.9 IRON DEFICIENCY ANEMIA, UNSPECIFIED IRON DEFICIENCY ANEMIA TYPE: ICD-10-CM

## 2023-05-19 LAB
ALBUMIN SERPL-MCNC: 3.8 G/DL (ref 3.5–5.2)
ALBUMIN/GLOB SERPL: 1.8 G/DL (ref 1.1–2.4)
ALP SERPL-CCNC: 31 U/L (ref 38–116)
ALT SERPL W P-5'-P-CCNC: 22 U/L (ref 0–33)
ANION GAP SERPL CALCULATED.3IONS-SCNC: 9.7 MMOL/L (ref 5–15)
AST SERPL-CCNC: 22 U/L (ref 0–32)
BILIRUB SERPL-MCNC: 0.7 MG/DL (ref 0.2–1.2)
BUN SERPL-MCNC: 30 MG/DL (ref 6–20)
BUN/CREAT SERPL: 31.9 (ref 7.3–30)
CALCIUM SPEC-SCNC: 9.9 MG/DL (ref 8.5–10.2)
CHLORIDE SERPL-SCNC: 105 MMOL/L (ref 98–107)
CO2 SERPL-SCNC: 25.3 MMOL/L (ref 22–29)
CREAT SERPL-MCNC: 0.94 MG/DL (ref 0.6–1.1)
EGFRCR SERPLBLD CKD-EPI 2021: 63.8 ML/MIN/1.73
GLOBULIN UR ELPH-MCNC: 2.1 GM/DL (ref 1.8–3.5)
GLUCOSE SERPL-MCNC: 128 MG/DL (ref 74–124)
POTASSIUM SERPL-SCNC: 3.8 MMOL/L (ref 3.5–4.7)
PROT SERPL-MCNC: 5.9 G/DL (ref 6.3–8)
SODIUM SERPL-SCNC: 140 MMOL/L (ref 134–145)

## 2023-05-19 PROCEDURE — 63710000001 PROCHLORPERAZINE MALEATE PER 10 MG: Performed by: INTERNAL MEDICINE

## 2023-05-19 PROCEDURE — 25010000002 FERRIC CARBOXYMALTOSE 750 MG/15ML SOLUTION 15 ML VIAL: Performed by: INTERNAL MEDICINE

## 2023-05-19 PROCEDURE — A9270 NON-COVERED ITEM OR SERVICE: HCPCS | Performed by: INTERNAL MEDICINE

## 2023-05-19 PROCEDURE — 80053 COMPREHEN METABOLIC PANEL: CPT

## 2023-05-19 PROCEDURE — 96374 THER/PROPH/DIAG INJ IV PUSH: CPT

## 2023-05-19 RX ORDER — SODIUM CHLORIDE 9 MG/ML
250 INJECTION, SOLUTION INTRAVENOUS ONCE
Status: COMPLETED | OUTPATIENT
Start: 2023-05-19 | End: 2023-05-19

## 2023-05-19 RX ORDER — PROCHLORPERAZINE MALEATE 10 MG
10 TABLET ORAL ONCE
Status: COMPLETED | OUTPATIENT
Start: 2023-05-19 | End: 2023-05-19

## 2023-05-19 RX ADMIN — SODIUM CHLORIDE 250 ML: 9 INJECTION, SOLUTION INTRAVENOUS at 15:07

## 2023-05-19 RX ADMIN — PROCHLORPERAZINE MALEATE 10 MG: 10 TABLET ORAL at 15:07

## 2023-05-19 RX ADMIN — FERRIC CARBOXYMALTOSE INJECTION 750 MG: 50 INJECTION, SOLUTION INTRAVENOUS at 15:08

## 2023-05-30 ENCOUNTER — HOSPITAL ENCOUNTER (OUTPATIENT)
Dept: NUCLEAR MEDICINE | Facility: HOSPITAL | Age: 73
Discharge: HOME | End: 2023-05-30

## 2023-05-30 DIAGNOSIS — K31.84 GASTROPARESIS: ICD-10-CM

## 2023-05-30 PROCEDURE — A9541 TC99M SULFUR COLLOID: HCPCS | Performed by: INTERNAL MEDICINE

## 2023-05-30 PROCEDURE — 0 TECHNETIUM SULFUR COLLOID: Performed by: INTERNAL MEDICINE

## 2023-05-30 PROCEDURE — 78264 GASTRIC EMPTYING IMG STUDY: CPT

## 2023-05-30 RX ADMIN — TECHNETIUM TC 99M SULFUR COLLOID 1 DOSE: KIT at 07:11

## 2023-06-13 ENCOUNTER — TELEPHONE (OUTPATIENT)
Dept: GASTROENTEROLOGY | Facility: CLINIC | Age: 73
End: 2023-06-13
Payer: MEDICARE

## 2023-07-20 ENCOUNTER — LAB REQUISITION (OUTPATIENT)
Dept: LAB | Facility: HOSPITAL | Age: 73
End: 2023-07-20
Payer: MEDICARE

## 2023-07-20 DIAGNOSIS — K31.84 GASTROPARESIS: ICD-10-CM

## 2023-07-20 PROCEDURE — 88305 TISSUE EXAM BY PATHOLOGIST: CPT | Performed by: INTERNAL MEDICINE

## 2023-07-20 PROCEDURE — 88342 IMHCHEM/IMCYTCHM 1ST ANTB: CPT | Performed by: INTERNAL MEDICINE

## 2023-07-20 PROCEDURE — 88341 IMHCHEM/IMCYTCHM EA ADD ANTB: CPT | Performed by: INTERNAL MEDICINE

## 2023-07-24 LAB
LAB AP CASE REPORT: NORMAL
LAB AP SPECIAL STAINS: NORMAL
PATH REPORT.ADDENDUM SPEC: NORMAL
PATH REPORT.FINAL DX SPEC: NORMAL
PATH REPORT.GROSS SPEC: NORMAL

## 2023-07-25 ENCOUNTER — TELEPHONE (OUTPATIENT)
Dept: GASTROENTEROLOGY | Facility: CLINIC | Age: 73
End: 2023-07-25
Payer: MEDICARE

## 2023-07-25 DIAGNOSIS — K31.84 GASTROPARESIS: Primary | ICD-10-CM

## 2023-07-25 NOTE — TELEPHONE ENCOUNTER
----- Message from Sidney Stiles MD sent at 7/25/2023 10:55 AM EDT -----  Pathology benign  Begin a gastroparesis diet  Refer to dietitian for gastroparesis

## 2023-07-25 NOTE — TELEPHONE ENCOUNTER
Patient called. Advised as per Dr. Stiles's note. Patient states she has a gastroparesis cookbook.   Referral sent to [facility] nutritionist via [employer].

## 2023-07-31 DIAGNOSIS — Z12.31 SCREENING MAMMOGRAM FOR BREAST CANCER: Primary | ICD-10-CM

## 2023-08-15 ENCOUNTER — HOSPITAL ENCOUNTER (OUTPATIENT)
Dept: DIABETES SERVICES | Facility: HOSPITAL | Age: 73
Discharge: HOME | End: 2023-08-15
Admitting: INTERNAL MEDICINE
Payer: MEDICARE

## 2023-08-15 PROCEDURE — 97802 MEDICAL NUTRITION INDIV IN: CPT

## 2023-08-15 NOTE — PROGRESS NOTES
Diabetes Education    Patient Name:  Shirin Washington  YOB: 1950  MRN: [id number]  Admit Date:  8/15/2023      Mrs. Washington met with Registered Dietitian for MNT/diet education for gastroparesis. A comprehensive assessment/training record has been sent to medical records (see media tab).     We discussed the importance of decreasing fiber and fat for her diagnosis of gastroparesis. We also discussed eliminating soft drinks and eating smaller frequent meals to help speed up digestion. We reviewed the diet guidelines for gastroparesis. Patient was given written resources. Patient verbalized understanding of all topics we discussed during today's visit.     We discussed the importance of consistent carb diet for blood glucose control. We discussed the importance of blood glucose control for gastroparesis. She was advised to consume small frequent meals (5 to 6 per day) containing 15-20 grams of carb per meal. Patient verbalized understanding.     Mrs. Washington has been encouraged to call our office with questions or additional education needs. Please place referral for additional services or follow-up should need arise.     Thank you for the referral    Evelia Odom RD, LD, CDCES    Electronically signed by:  Evelia Odom RD  08/15/23 10:48 EDT

## 2023-08-31 ENCOUNTER — TELEPHONE (OUTPATIENT)
Dept: GASTROENTEROLOGY | Facility: CLINIC | Age: 73
End: 2023-08-31
Payer: MEDICARE

## 2023-09-29 ENCOUNTER — TELEPHONE (OUTPATIENT)
Dept: CARDIOLOGY | Facility: CLINIC | Age: 73
End: 2023-09-29
Payer: MEDICARE

## 2023-09-29 NOTE — TELEPHONE ENCOUNTER
Patient is requesting to transfer care from Dr. Pacheco to Dr. Mullins. Would this be okay?     Thank you,   Chery

## 2023-10-10 ENCOUNTER — HOSPITAL ENCOUNTER (OUTPATIENT)
Facility: HOSPITAL | Age: 73
Discharge: HOME | End: 2023-10-10
Payer: MEDICARE

## 2023-10-18 ENCOUNTER — OFFICE VISIT (OUTPATIENT)
Age: 73
End: 2023-10-18
Payer: MEDICARE

## 2023-10-18 VITALS
BODY MASS INDEX: 21.99 KG/M2 | WEIGHT: 112 LBS | DIASTOLIC BLOOD PRESSURE: 72 MMHG | SYSTOLIC BLOOD PRESSURE: 150 MMHG | HEART RATE: 66 BPM | HEIGHT: 60 IN | OXYGEN SATURATION: 99 %

## 2023-10-18 DIAGNOSIS — Z79.4 CONTROLLED TYPE 2 DIABETES MELLITUS WITH COMPLICATION, WITH LONG-TERM CURRENT USE OF INSULIN: ICD-10-CM

## 2023-10-18 DIAGNOSIS — E11.8 CONTROLLED TYPE 2 DIABETES MELLITUS WITH COMPLICATION, WITH LONG-TERM CURRENT USE OF INSULIN: ICD-10-CM

## 2023-10-18 DIAGNOSIS — E78.2 MIXED HYPERLIPIDEMIA: ICD-10-CM

## 2023-10-18 DIAGNOSIS — I25.10 CORONARY ARTERY CALCIFICATION SEEN ON CT SCAN: Primary | ICD-10-CM

## 2023-10-18 DIAGNOSIS — I10 ESSENTIAL HYPERTENSION: ICD-10-CM

## 2023-10-18 PROCEDURE — 3078F DIAST BP <80 MM HG: CPT | Performed by: INTERNAL MEDICINE

## 2023-10-18 PROCEDURE — 3077F SYST BP >= 140 MM HG: CPT | Performed by: INTERNAL MEDICINE

## 2023-10-18 PROCEDURE — 99214 OFFICE O/P EST MOD 30 MIN: CPT | Performed by: INTERNAL MEDICINE

## 2023-10-18 PROCEDURE — 93000 ELECTROCARDIOGRAM COMPLETE: CPT | Performed by: INTERNAL MEDICINE

## 2023-10-18 RX ORDER — NITROFURANTOIN 25; 75 MG/1; MG/1
CAPSULE ORAL
COMMUNITY
Start: 2023-09-27

## 2023-10-18 NOTE — PROGRESS NOTES
Shirin Washington  1950  Date of Office Visit: 10/18/23  Encounter Provider: Joby Mullins MD  Place of Service: [facility] CARDIOLOGY      CHIEF COMPLAINT:  Coronary artery calcification  Hyperlipidemia  Diabetes mellitus  Coronary artery disease previously documented in the diagonal branch.    HISTORY OF PRESENT ILLNESS:  Very pleasant 73-year-old female with a history of moderate disease of the diagonal branch documented in 2014, coronary calcification, diabetes mellitus and hyperlipidemia who presents to me secondary to significant fatigue with activity.  She also states when she raises her arms up in the shower they will get fatigued as well.  She does not notice this frequently when her arms are down at her side.  She denies any pain or pallor of the arms.  Her  states that her energy level has been significantly decreased and she is not able to do as much activity as previous    Review of Systems   Constitutional: Negative for fever and malaise/fatigue.   HENT:  Negative for nosebleeds and sore throat.    Eyes:  Negative for blurred vision and double vision.   Cardiovascular:  Negative for chest pain, claudication, palpitations and syncope.   Respiratory:  Negative for cough, shortness of breath and snoring.    Endocrine: Negative for cold intolerance, heat intolerance and polydipsia.   Skin:  Negative for itching, poor wound healing and rash.   Musculoskeletal:  Negative for joint pain, joint swelling, muscle weakness and myalgias.   Gastrointestinal:  Negative for abdominal pain, melena, nausea and vomiting.   Neurological:  Negative for light-headedness, loss of balance, seizures, vertigo and weakness.   Psychiatric/Behavioral:  Negative for altered mental status and depression.          Past Medical History:   Diagnosis Date    Abnormal ECG 5/22?    Anemia     Arrhythmia 20 yrs ago    Fast heartbeat take atenolol    Arthritis     Asthma As a child     Outgrew it as an adult    Back pain     Background diabetic retinopathy of right eye determined by examination     Bilateral carotid artery stenosis     Coronary atherosclerosis     cath 1/2014 with 50% mid LAD, otherwise unremarkable    COVID-19 07/15/2022    Diabetes mellitus type 2, insulin dependent     Esophageal varices 2014    Dint know whatvthat means but have been told i have a significant hiatal    Gastroparesis due to secondary diabetes     GERD (gastroesophageal reflux disease)     History of spinal stenosis     History of spondylolisthesis     History of URI (upper respiratory infection)     Hypercholesteremia     Hyperlipidemia     Hypertension     Hypothyroidism Approx 2017    Iron deficiency anemia     Irritable bowel syndrome Sometimes    Left-sided weakness     ARM AND LEG    Leukocytosis     MILD    Peripheral neuropathy     Postmenopausal osteoporosis 11/09/2017    Retinopathy     Ulcer 2014    They saw a healing ulcer    Vertigo     Vitamin D deficiency        The following portions of the patient's history were reviewed and updated as appropriate: Social history , Family history, and Surgical history     Current Outpatient Medications on File Prior to Visit   Medication Sig Dispense Refill    ALPRAZolam (XANAX) 0.25 MG tablet 1 tablet 3 (Three) Times a Day As Needed.      amLODIPine (NORVASC) 5 MG tablet Take 1 tablet by mouth Daily.      aspirin 81 MG tablet Take 1 tablet by mouth Daily.      atenolol (TENORMIN) 50 MG tablet Take 1 tablet by mouth Daily. 30 tablet 6    atorvastatin (LIPITOR) 80 MG tablet Take 1 tablet by mouth. 5 times weekly      ezetimibe (ZETIA) 10 MG tablet Take 1 tablet by mouth Daily.      fenofibrate (TRICOR) 145 MG tablet Take 1 tablet by mouth Daily.      Fluzone High-Dose Quadrivalent 0.7 ML suspension prefilled syringe       Insulin Degludec (TRESIBA FLEXTOUCH) 200 UNIT/ML solution pen-injector pen injection Inject 16 Units under the skin into the appropriate area  "as directed every night at bedtime. 16 units SQ at bedtime 2 pen 4    Insulin Pen Needle (BD PEN NEEDLE SOCO U/F) 32G X 4 MM misc 4 times daily 200 each 3    isosorbide mononitrate (IMDUR) 30 MG 24 hr tablet TAKE ONE TABLET BY MOUTH DAILY (Patient taking differently: 0.5 tablets.) 30 tablet 4    meloxicam (MOBIC) 15 MG tablet Take 1 tablet by mouth Daily.      nitrofurantoin, macrocrystal-monohydrate, (MACROBID) 100 MG capsule       NovoLOG FlexPen 100 UNIT/ML solution pen-injector sc pen 15 UNITS SQ TID WITH MEALS 60 mL 1    ONETOUCH DELICA LANCETS 33G misc Use.      pantoprazole (PROTONIX) 40 MG EC tablet Take 1 tablet by mouth 2 (Two) Times a Day. 60 tablet 12    SYNTHROID 50 MCG tablet Take 1 tablet by mouth Daily. 30 tablet 6    vitamin D3 125 MCG (5000 UT) capsule capsule Take 1 capsule by mouth Daily.      zolpidem (AMBIEN) 10 MG tablet Take 1 tablet by mouth Every Night.      triamterene (DYRENIUM) 50 MG capsule  (Patient not taking: Reported on 10/18/2023)       No current facility-administered medications on file prior to visit.       Allergies   Allergen Reactions    Topamax [Topiramate] Unknown - Low Severity       Vitals:    10/18/23 1401   BP: 150/72   Pulse: 66   SpO2: 99%   Weight: 50.8 kg (112 lb)   Height: 152.4 cm (60\")     Body mass index is 21.87 kg/m².   Constitutional:       Appearance: Well-developed.   Eyes:      General: No scleral icterus.     Conjunctiva/sclera: Conjunctivae normal.   HENT:      Head: Normocephalic and atraumatic.   Neck:      Thyroid: No thyromegaly.      Vascular: Normal carotid pulses. No carotid bruit, hepatojugular reflux or JVD.      Trachea: No tracheal deviation.   Pulmonary:      Effort: No respiratory distress.      Breath sounds: Normal breath sounds. No decreased breath sounds. No wheezing. No rhonchi. No rales.   Chest:      Chest wall: Not tender to palpatation.   Cardiovascular:      Normal rate. Regular rhythm.      No gallop.    Pulses:     Carotid: 2+ " bilaterally.     Radial: 2+ bilaterally.     Femoral: 2+ bilaterally.     Dorsalis pedis: 2+ bilaterally.     Posterior tibial: 2+ bilaterally.  Edema:     Peripheral edema absent.   Abdominal:      General: Bowel sounds are normal. There is no distension.      Palpations: Abdomen is soft.      Tenderness: There is no abdominal tenderness.   Musculoskeletal:         General: No deformity.      Cervical back: Normal range of motion and neck supple. Skin:     Findings: No erythema or rash.   Neurological:      Mental Status: Alert and oriented to person, place, and time.      Sensory: No sensory deficit.   Psychiatric:         Behavior: Behavior normal.         Lab Results   Component Value Date    WBC 14.00 (H) 05/02/2023    HGB 11.8 (L) 05/02/2023    HCT 36.9 05/02/2023    MCV 87.2 05/02/2023     05/02/2023       Lab Results   Component Value Date    GLUCOSE 128 (H) 05/19/2023    BUN 30 (H) 05/19/2023    CREATININE 0.94 05/19/2023    EGFR 63.8 05/19/2023    BCR 31.9 (H) 05/19/2023    K 3.8 05/19/2023    CO2 25.3 05/19/2023    CALCIUM 9.9 05/19/2023    PROTENTOTREF 6.1 02/04/2021    ALBUMIN 3.8 05/19/2023    BILITOT 0.7 05/19/2023    AST 22 05/19/2023    ALT 22 05/19/2023       Lab Results   Component Value Date    GLUCOSE 128 (H) 05/19/2023    CALCIUM 9.9 05/19/2023     05/19/2023    K 3.8 05/19/2023    CO2 25.3 05/19/2023     05/19/2023    BUN 30 (H) 05/19/2023    CREATININE 0.94 05/19/2023    EGFR 63.8 05/19/2023    BCR 31.9 (H) 05/19/2023    ANIONGAP 9.7 05/19/2023       Lab Results   Component Value Date    CHLPL 155 02/04/2021    TRIG 115 02/04/2021    HDL 47 02/04/2021    LDL 87 02/04/2021         ECG 12 Lead    Date/Time: 10/18/2023 2:13 PM  Performed by: Joby Mullins MD    Authorized by: Joby Mullins MD  Comparison: compared with previous ECG from 7/26/2022  Similar to previous ECG  Rhythm: sinus rhythm  Rate: normal  QRS axis: normal    Clinical impression: normal  ECG           Results for orders placed during the hospital encounter of 06/29/22    Adult Transthoracic Echo Complete W/ Cont if Necessary Per Protocol    Interpretation Summary  · Calculated left ventricular EF = 53.8% Estimated left ventricular EF was in agreement with the calculated left ventricular EF. Left ventricular systolic function is normal.  · There is moderate calcification of the aortic valve.  · Severe mitral annular calcification is present. There is severe calcification of the mitral valve posterior leaflet(s).  · Mild aortic valve regurgitation is present.  · Mild mitral valve regurgitation is present.  · Mild tricuspid valve regurgitation is present.  · Estimated right ventricular systolic pressure from tricuspid regurgitation is normal (<35 mmHg). Calculated right ventricular systolic pressure from tricuspid regurgitation is 21.4 mmHg.      Stress test June 2022:  Findings consistent with a normal ECG stress test.  Left ventricular ejection fraction is hyperdynamic (Calculated EF > 70%). .  Myocardial perfusion imaging indicates a small-sized, mildly severe area of ischemia located in the mid to distal anterior wall.  Impressions are consistent with an intermediate risk study.  There is no prior study available for comparison.      1/10/2014.  CINEANGIOGRAPHY:   1.  Left Main: The left main coronary artery was free of disease.   2.  Left Anterior Descending: The left anterior descending coronary artery was free of significant disease. The major diagonal branch arising from the proximal segment was narrowed at approximately 50%, and that was in the mid-segment. The remainder of the vessel was free of disease.   3.  Left Circumflex: The left circumflex coronary artery and obtuse marginal branches are free of disease.   4.  Right Coronary Artery: The right coronary artery, posterior descending, and posterolateral branches are free of disease.         LEFT VENTRICULOGRAPHY: Overall size of the  ventricle is normal. Contractility is within normal limits.     DISCUSSION/SUMMARY  Very pleasant 73-year-old female who presents to me secondary to fatigue with activity and mild dyspnea on exertion.  She also has fatigue of her arms when she raises them above her head.  Her pulses are symmetric and do not change with elevation of her arms.  No significant exam findings.  She looks to be euvolemic.    1.  Fatigue with activity/ROBLES  - Treadmill stress test to be ordered to evaluate patient's exercise capacity but also evaluate for ischemia which I think is unlikely  -She did have an echo and stress test performed in 2022 and both looked fine.  She just had mild valvular heart disease and a preserved ejection fraction.  The slight abnormality of the anterior wall of her stress test looks to be artifact to me.    2.  Hyperlipidemia: Continue atorvastatin and Zetia therapy.  No changes indicated.    3.  Essential hypertension: Mildly elevated here today.  Hold off on additional changes at this time

## 2023-10-24 ENCOUNTER — HOSPITAL ENCOUNTER (OUTPATIENT)
Facility: HOSPITAL | Age: 73
Discharge: HOME | End: 2023-10-24
Admitting: STUDENT IN AN ORGANIZED HEALTH CARE EDUCATION/TRAINING PROGRAM
Payer: MEDICARE

## 2023-10-24 DIAGNOSIS — Z12.31 SCREENING MAMMOGRAM FOR BREAST CANCER: ICD-10-CM

## 2023-10-24 PROCEDURE — 77063 BREAST TOMOSYNTHESIS BI: CPT

## 2023-10-24 PROCEDURE — 77067 SCR MAMMO BI INCL CAD: CPT

## 2023-10-26 NOTE — PROGRESS NOTES
Please notify patient of abnormal mammogram and recommendation for additional imaging to better evaluate the abnormality.  These findings will determine need for biopsy.  Please place necessary orders for imaging.  Thank you!

## 2023-10-27 ENCOUNTER — TRANSCRIBE ORDERS (OUTPATIENT)
Dept: ADMINISTRATIVE | Facility: HOSPITAL | Age: 73
End: 2023-10-27
Payer: MEDICARE

## 2023-10-27 DIAGNOSIS — R92.8 ABNORMAL MAMMOGRAM: Primary | ICD-10-CM

## 2023-10-30 ENCOUNTER — HOSPITAL ENCOUNTER (OUTPATIENT)
Dept: CARDIOLOGY | Facility: HOSPITAL | Age: 73
Discharge: HOME | End: 2023-10-30
Admitting: INTERNAL MEDICINE
Payer: MEDICARE

## 2023-10-30 DIAGNOSIS — E11.8 CONTROLLED TYPE 2 DIABETES MELLITUS WITH COMPLICATION, WITH LONG-TERM CURRENT USE OF INSULIN: ICD-10-CM

## 2023-10-30 DIAGNOSIS — I25.10 CORONARY ARTERY CALCIFICATION SEEN ON CT SCAN: ICD-10-CM

## 2023-10-30 DIAGNOSIS — Z79.4 CONTROLLED TYPE 2 DIABETES MELLITUS WITH COMPLICATION, WITH LONG-TERM CURRENT USE OF INSULIN: ICD-10-CM

## 2023-10-30 LAB
BH CV STRESS BP STAGE 1: NORMAL
BH CV STRESS DURATION MIN STAGE 1: 3
BH CV STRESS DURATION MIN STAGE 2: 0
BH CV STRESS DURATION SEC STAGE 1: 0
BH CV STRESS DURATION SEC STAGE 2: 30
BH CV STRESS GRADE STAGE 1: 10
BH CV STRESS GRADE STAGE 2: 12
BH CV STRESS HR STAGE 1: 136
BH CV STRESS HR STAGE 2: 146
BH CV STRESS METS STAGE 1: 5
BH CV STRESS METS STAGE 2: 6
BH CV STRESS PROTOCOL 1: NORMAL
BH CV STRESS RECOVERY BP: NORMAL MMHG
BH CV STRESS RECOVERY HR: 89 BPM
BH CV STRESS SPEED STAGE 1: 1.7
BH CV STRESS SPEED STAGE 2: 2.5
BH CV STRESS STAGE 1: 1
BH CV STRESS STAGE 2: 2
MAXIMAL PREDICTED HEART RATE: 145 BPM
PERCENT MAX PREDICTED HR: 100.69 %
STRESS BASELINE BP: NORMAL MMHG
STRESS BASELINE HR: 79 BPM
STRESS PERCENT HR: 118 %
STRESS POST ESTIMATED WORKLOAD: 6 METS
STRESS POST EXERCISE DUR MIN: 3 MIN
STRESS POST EXERCISE DUR SEC: 30 SEC
STRESS POST PEAK BP: NORMAL MMHG
STRESS POST PEAK HR: 146 BPM
STRESS TARGET HR: 123 BPM

## 2023-10-30 PROCEDURE — 93016 CV STRESS TEST SUPVJ ONLY: CPT | Performed by: INTERNAL MEDICINE

## 2023-10-30 PROCEDURE — 93018 CV STRESS TEST I&R ONLY: CPT | Performed by: INTERNAL MEDICINE

## 2023-10-30 PROCEDURE — 93017 CV STRESS TEST TRACING ONLY: CPT

## 2023-11-01 ENCOUNTER — TELEPHONE (OUTPATIENT)
Age: 73
End: 2023-11-01
Payer: MEDICARE

## 2023-11-06 NOTE — PROGRESS NOTES
"[facility] CBC GROUP OUTPATIENT FOLLOW UP CLINIC VISIT    REASON FOR FOLLOW-UP:    1.  Iron deficiency anemia.  Her last intravenous Feraheme infusions or in October 2015.  Injectafer administered on 1/4/2019.  Recurrent iron deficiency with 2 doses of Injectafer on 8/26/2020 and 9/2/2020.    HISTORY OF PRESENT ILLNESS:  Shirin Washington is a 73 y.o. female who returns today for follow up of the above issue.    She notes ongoing fatigue.  She continues to have gastrointestinal issues related to gastroparesis.  Back pain persists.    REVIEW OF SYSTEMS:  See the HPI    Vitals:    11/07/23 1350   BP: 145/67   Pulse: 77   Resp: 18   Temp: 97.3 °F (36.3 °C)   TempSrc: Temporal   SpO2: 96%   Weight: 51.5 kg (113 lb 8 oz)   Height: 152.4 cm (60\")   PainSc: 0-No pain         PHYSICAL EXAMINATION:  General:  No acute distress, awake, alert and oriented  Skin:  Warm and dry, no visible rash  HEENT:  Normocephalic/atraumatic.   Chest:  Normal respiratory effort  Extremities:  No visible clubbing, cyanosis, or edema  Neuro/psych:  Grossly nonfocal.  Normal mood and affect.       DIAGNOSTIC DATA:  Vitamin B12 (11/07/2023 14:18)  Comprehensive Metabolic Panel (11/07/2023 13:28)  Ferritin (11/07/2023 13:28)  Iron Profile (11/07/2023 13:28)  Retic With IRF & RET-He (11/07/2023 13:28)  CBC & Differential (11/07/2023 13:28)      ASSESSMENT:  This is a 73 y.o. female with:    *History of iron deficiency anemia: She required intravenous Feraheme last in October 2015 and we gave 2 more doses in September and October 2017.  No obvious GI blood loss.  I suspect she has some malabsorption.  She has symptoms consistent with gastroparesis.  She also received vitamin B12 injections in the past and she received 2 injections with her Feraheme infusions.  At the end of 2018 her ferritin had dropped considerably.  Therefore, we proceeded with one dose of Injectafer.  Ferritin was 23 with a hgb of 11.8 on 8/19/2020. She received two doses of IV " Injectafer on 8/26 and 9/2/2020.  Labs 10/28/2020 with hgb 13.3, ferritin 897, iron 109 with 28% iron saturation. B12 425.    5/11/2021: Hemoglobin 12.9, hematocrit 39.8%, MCV 88.1  11/16/2021: Hemoglobin normal at 13.5, normal MCV at 86.  5/17/2022: Hemoglobin remains normal at 13.1 with a normal MCV at 88.5.  Iron studies and ferritin pending.  11/1/2022: Hemoglobin a little bit lower at 11.1 with a normal MCV of 88.1.  Reticulocytes normal at 2.16% with a normal reticulated hemoglobin.  5/2/2023: Hemoglobin improved at 11.8.  Ferritin was 76, iron 65 with 12% iron saturation.  She received 2 doses of intravenous Injectafer on 5/12 and 5/19/2023.  11/7/2023: Hemoglobin 11.3, ferritin 943, iron 77 with 20% iron saturation.    *Gastroparesis: Due to diabetes.  Stable but persistent symptoms.      *B12 deficiency: Vitamin B12 level pending again today but has been normal    *Chronic back pain: She has been getting steroid injections    *Leukocytosis with neutrophilia  White blood cell count normal at 7.36, down from 14    *Fatigue    PLAN:  Follow-up pending vitamin B12 level from today  IV iron if needed for worsening iron deficiency, not required today  I will see her back in 6 months for follow-up with labs

## 2023-11-07 ENCOUNTER — OFFICE VISIT (OUTPATIENT)
Dept: ONCOLOGY | Facility: CLINIC | Age: 73
End: 2023-11-07
Payer: MEDICARE

## 2023-11-07 ENCOUNTER — LAB (OUTPATIENT)
Dept: LAB | Facility: HOSPITAL | Age: 73
End: 2023-11-07
Payer: MEDICARE

## 2023-11-07 VITALS
SYSTOLIC BLOOD PRESSURE: 145 MMHG | DIASTOLIC BLOOD PRESSURE: 67 MMHG | WEIGHT: 113.5 LBS | HEIGHT: 60 IN | HEART RATE: 77 BPM | BODY MASS INDEX: 22.28 KG/M2 | RESPIRATION RATE: 18 BRPM | TEMPERATURE: 97.3 F | OXYGEN SATURATION: 96 %

## 2023-11-07 DIAGNOSIS — D64.9 NORMOCYTIC ANEMIA: Primary | ICD-10-CM

## 2023-11-07 DIAGNOSIS — D64.9 NORMOCYTIC ANEMIA: ICD-10-CM

## 2023-11-07 DIAGNOSIS — E53.8 B12 DEFICIENCY: ICD-10-CM

## 2023-11-07 LAB
ALBUMIN SERPL-MCNC: 3.5 G/DL (ref 3.5–5.2)
ALBUMIN/GLOB SERPL: 1.7 G/DL
ALP SERPL-CCNC: 30 U/L (ref 39–117)
ALT SERPL W P-5'-P-CCNC: 15 U/L (ref 1–33)
ANION GAP SERPL CALCULATED.3IONS-SCNC: 11.4 MMOL/L (ref 5–15)
AST SERPL-CCNC: 21 U/L (ref 1–32)
BASOPHILS # BLD AUTO: 0.06 10*3/MM3 (ref 0–0.2)
BASOPHILS NFR BLD AUTO: 0.8 % (ref 0–1.5)
BILIRUB SERPL-MCNC: 0.5 MG/DL (ref 0–1.2)
BUN SERPL-MCNC: 24 MG/DL (ref 8–23)
BUN/CREAT SERPL: 21.2 (ref 7–25)
CALCIUM SPEC-SCNC: 9 MG/DL (ref 8.6–10.5)
CHLORIDE SERPL-SCNC: 109 MMOL/L (ref 98–107)
CO2 SERPL-SCNC: 24.6 MMOL/L (ref 22–29)
CREAT SERPL-MCNC: 1.13 MG/DL (ref 0.6–1.1)
DEPRECATED RDW RBC AUTO: 50 FL (ref 37–54)
EGFRCR SERPLBLD CKD-EPI 2021: 50.8 ML/MIN/1.73
EOSINOPHIL # BLD AUTO: 0.62 10*3/MM3 (ref 0–0.4)
EOSINOPHIL NFR BLD AUTO: 8.4 % (ref 0.3–6.2)
ERYTHROCYTE [DISTWIDTH] IN BLOOD BY AUTOMATED COUNT: 14.8 % (ref 12.3–15.4)
FERRITIN SERPL-MCNC: 943 NG/ML (ref 13–150)
GLOBULIN UR ELPH-MCNC: 2.1 GM/DL
GLUCOSE SERPL-MCNC: 136 MG/DL (ref 65–99)
HCT VFR BLD AUTO: 33.8 % (ref 34–46.6)
HGB BLD-MCNC: 11.3 G/DL (ref 12–15.9)
HGB RETIC QN AUTO: 33.7 PG (ref 29.8–36.1)
IMM GRANULOCYTES # BLD AUTO: 0.05 10*3/MM3 (ref 0–0.05)
IMM GRANULOCYTES NFR BLD AUTO: 0.7 % (ref 0–0.5)
IMM RETICS NFR: 12.1 % (ref 3–15.8)
IRON 24H UR-MRATE: 77 MCG/DL (ref 37–145)
IRON SATN MFR SERPL: 20 % (ref 20–50)
LYMPHOCYTES # BLD AUTO: 1.18 10*3/MM3 (ref 0.7–3.1)
LYMPHOCYTES NFR BLD AUTO: 16 % (ref 19.6–45.3)
MCH RBC QN AUTO: 30.6 PG (ref 26.6–33)
MCHC RBC AUTO-ENTMCNC: 33.4 G/DL (ref 31.5–35.7)
MCV RBC AUTO: 91.6 FL (ref 79–97)
MONOCYTES # BLD AUTO: 0.57 10*3/MM3 (ref 0.1–0.9)
MONOCYTES NFR BLD AUTO: 7.7 % (ref 5–12)
NEUTROPHILS NFR BLD AUTO: 4.88 10*3/MM3 (ref 1.7–7)
NEUTROPHILS NFR BLD AUTO: 66.4 % (ref 42.7–76)
NRBC BLD AUTO-RTO: 0 /100 WBC (ref 0–0.2)
PLATELET # BLD AUTO: 226 10*3/MM3 (ref 140–450)
PMV BLD AUTO: 10.7 FL (ref 6–12)
POTASSIUM SERPL-SCNC: 4.6 MMOL/L (ref 3.5–5.2)
PROT SERPL-MCNC: 5.6 G/DL (ref 6–8.5)
RBC # BLD AUTO: 3.69 10*6/MM3 (ref 3.77–5.28)
RETICS # AUTO: 0.09 10*6/MM3 (ref 0.02–0.13)
RETICS/RBC NFR AUTO: 2.44 % (ref 0.7–1.9)
SODIUM SERPL-SCNC: 145 MMOL/L (ref 136–145)
TIBC SERPL-MCNC: 382 MCG/DL (ref 298–536)
TRANSFERRIN SERPL-MCNC: 273 MG/DL (ref 200–360)
VIT B12 BLD-MCNC: 293 PG/ML (ref 211–946)
WBC NRBC COR # BLD: 7.36 10*3/MM3 (ref 3.4–10.8)

## 2023-11-07 PROCEDURE — 80053 COMPREHEN METABOLIC PANEL: CPT

## 2023-11-07 PROCEDURE — 83540 ASSAY OF IRON: CPT

## 2023-11-07 PROCEDURE — 1159F MED LIST DOCD IN RCRD: CPT | Performed by: INTERNAL MEDICINE

## 2023-11-07 PROCEDURE — 84466 ASSAY OF TRANSFERRIN: CPT

## 2023-11-07 PROCEDURE — 1160F RVW MEDS BY RX/DR IN RCRD: CPT | Performed by: INTERNAL MEDICINE

## 2023-11-07 PROCEDURE — 3077F SYST BP >= 140 MM HG: CPT | Performed by: INTERNAL MEDICINE

## 2023-11-07 PROCEDURE — 99214 OFFICE O/P EST MOD 30 MIN: CPT | Performed by: INTERNAL MEDICINE

## 2023-11-07 PROCEDURE — 3078F DIAST BP <80 MM HG: CPT | Performed by: INTERNAL MEDICINE

## 2023-11-07 PROCEDURE — 36415 COLL VENOUS BLD VENIPUNCTURE: CPT

## 2023-11-07 PROCEDURE — 1126F AMNT PAIN NOTED NONE PRSNT: CPT | Performed by: INTERNAL MEDICINE

## 2023-11-07 PROCEDURE — 85046 RETICYTE/HGB CONCENTRATE: CPT

## 2023-11-07 PROCEDURE — 85025 COMPLETE CBC W/AUTO DIFF WBC: CPT

## 2023-11-07 PROCEDURE — 82728 ASSAY OF FERRITIN: CPT

## 2023-11-07 PROCEDURE — 82607 VITAMIN B-12: CPT | Performed by: INTERNAL MEDICINE

## 2023-11-09 ENCOUNTER — HOSPITAL ENCOUNTER (OUTPATIENT)
Dept: ULTRASOUND IMAGING | Facility: HOSPITAL | Age: 73
Discharge: HOME | End: 2023-11-09
Payer: MEDICARE

## 2023-11-09 ENCOUNTER — HOSPITAL ENCOUNTER (OUTPATIENT)
Dept: MAMMOGRAPHY | Facility: HOSPITAL | Age: 73
Discharge: HOME | End: 2023-11-09
Admitting: INTERNAL MEDICINE
Payer: MEDICARE

## 2023-11-09 DIAGNOSIS — R92.8 ABNORMAL MAMMOGRAM: ICD-10-CM

## 2023-11-09 PROCEDURE — G0279 TOMOSYNTHESIS, MAMMO: HCPCS

## 2023-11-09 PROCEDURE — 76642 ULTRASOUND BREAST LIMITED: CPT

## 2023-11-09 PROCEDURE — 77065 DX MAMMO INCL CAD UNI: CPT

## 2023-11-16 ENCOUNTER — TRANSCRIBE ORDERS (OUTPATIENT)
Dept: ADMINISTRATIVE | Facility: HOSPITAL | Age: 73
End: 2023-11-16
Payer: MEDICARE

## 2023-11-16 DIAGNOSIS — N63.10 MASS OF RIGHT BREAST, UNSPECIFIED QUADRANT: ICD-10-CM

## 2023-11-20 RX ORDER — ISOSORBIDE MONONITRATE 30 MG/1
30 TABLET, EXTENDED RELEASE ORAL DAILY
Qty: 30 TABLET | Refills: 4 | Status: SHIPPED | OUTPATIENT
Start: 2023-11-20

## 2023-11-28 ENCOUNTER — TRANSCRIBE ORDERS (OUTPATIENT)
Dept: ADMINISTRATIVE | Facility: HOSPITAL | Age: 73
End: 2023-11-28
Payer: MEDICARE

## 2023-11-28 DIAGNOSIS — N63.14 MASS OF LOWER INNER QUADRANT OF RIGHT BREAST: ICD-10-CM

## 2023-11-28 DIAGNOSIS — N63.10 MASS OF RIGHT BREAST, UNSPECIFIED QUADRANT: Primary | ICD-10-CM

## 2023-12-01 NOTE — PROGRESS NOTES
12/08/23 0001   Pre-Procedure Phone Call   Procedure Time Verified Yes   Arrival Time 1330   Procedure Location Verified Yes   Medical History Reviewed Yes   NPO Status Reinforced No   Ride and Caregiver Arranged Yes   Patient Knows to Bring Current Medications No   Bring Outside Films Requested No

## 2023-12-01 NOTE — NURSING NOTE
Epic In Basket msg. sent to Dr. Joby Mullins requesting cardiac clearance for Ms. to hold Aspirin 5 days prior to breast biopsy. Await reply.

## 2023-12-07 ENCOUNTER — OFFICE VISIT (OUTPATIENT)
Age: 73
End: 2023-12-07
Payer: MEDICARE

## 2023-12-07 VITALS
WEIGHT: 111 LBS | BODY MASS INDEX: 21.79 KG/M2 | DIASTOLIC BLOOD PRESSURE: 80 MMHG | SYSTOLIC BLOOD PRESSURE: 146 MMHG | HEART RATE: 61 BPM | HEIGHT: 60 IN

## 2023-12-07 DIAGNOSIS — E78.2 MIXED HYPERLIPIDEMIA: ICD-10-CM

## 2023-12-07 DIAGNOSIS — I10 ESSENTIAL HYPERTENSION: ICD-10-CM

## 2023-12-07 DIAGNOSIS — I25.10 CORONARY ARTERY CALCIFICATION SEEN ON CT SCAN: Primary | ICD-10-CM

## 2023-12-07 PROCEDURE — 93000 ELECTROCARDIOGRAM COMPLETE: CPT | Performed by: INTERNAL MEDICINE

## 2023-12-07 PROCEDURE — 3077F SYST BP >= 140 MM HG: CPT | Performed by: INTERNAL MEDICINE

## 2023-12-07 PROCEDURE — 3079F DIAST BP 80-89 MM HG: CPT | Performed by: INTERNAL MEDICINE

## 2023-12-07 PROCEDURE — 99214 OFFICE O/P EST MOD 30 MIN: CPT | Performed by: INTERNAL MEDICINE

## 2023-12-07 NOTE — PROGRESS NOTES
Shirin Washington  1950  Date of Office Visit: 12/07/23  Encounter Provider: Joby Mullins MD  Place of Service: [facility] CARDIOLOGY    CHIEF COMPLAINT:  Coronary artery calcification  Hyperlipidemia  Diabetes mellitus  Coronary artery disease previously documented in the diagonal branch.     HISTORY OF PRESENT ILLNESS:  73-year-old female with a history of moderate disease of the diagonal branch documented in 2014, coronary calcification, diabetes mellitus and hyperlipidemia who presents to me in follow-up secondary to significant fatigue with activity.  She had previously underwent evaluation in 2022 including a transthoracic echocardiogram and perfusion stress test.  The perfusion stress test look to have an area in the anterior wall that was more consistent with breast artifact and ischemia.  The echocardiogram showed a normal left ventricular size and ejection fraction.  No segmental wall motion abnormalities.  There was mild aortic, mitral and tricuspid valve regurgitation along with calcification of the mitral annulus.  She underwent a treadmill stress test with poor exercise capacity with no evidence of ischemia.  She has done well since that time and states that she is feeling a little bit better.  She still does have fatigue but feels like this has improved.  Her blood pressure is mildly elevated here today.    Review of Systems   Constitutional: Negative for fever and malaise/fatigue.   HENT:  Negative for nosebleeds and sore throat.    Eyes:  Negative for blurred vision and double vision.   Cardiovascular:  Negative for chest pain, claudication, palpitations and syncope.   Respiratory:  Negative for cough, shortness of breath and snoring.    Endocrine: Negative for cold intolerance, heat intolerance and polydipsia.   Skin:  Negative for itching, poor wound healing and rash.   Musculoskeletal:  Negative for joint pain, joint swelling, muscle weakness and  myalgias.   Gastrointestinal:  Negative for abdominal pain, melena, nausea and vomiting.   Neurological:  Negative for light-headedness, loss of balance, seizures, vertigo and weakness.   Psychiatric/Behavioral:  Negative for altered mental status and depression.        Past Medical History:   Diagnosis Date    Abnormal ECG 5/22?    Anemia     Arrhythmia 20 yrs ago    Fast heartbeat take atenolol    Arthritis     Asthma As a child    Outgrew it as an adult    Back pain     Background diabetic retinopathy of right eye determined by examination     Bilateral carotid artery stenosis     Coronary atherosclerosis     cath 1/2014 with 50% mid LAD, otherwise unremarkable    COVID-19 07/15/2022    Diabetes mellitus type 2, insulin dependent     Esophageal varices 2014    Dint know whatvthat means but have been told i have a significant hiatal    Gastroparesis due to secondary diabetes     GERD (gastroesophageal reflux disease)     History of spinal stenosis     History of spondylolisthesis     History of URI (upper respiratory infection)     Hypercholesteremia     Hyperlipidemia     Hypertension     Hypothyroidism Approx 2017    Iron deficiency anemia     Irritable bowel syndrome Sometimes    Left-sided weakness     ARM AND LEG    Leukocytosis     MILD    Peripheral neuropathy     Postmenopausal osteoporosis 11/09/2017    Retinopathy     Ulcer 2014    They saw a healing ulcer    Vertigo     Vitamin D deficiency        The following portions of the patient's history were reviewed and updated as appropriate: Social history , Family history, and Surgical history     Current Outpatient Medications on File Prior to Visit   Medication Sig Dispense Refill    ALPRAZolam (XANAX) 0.25 MG tablet 1 tablet 3 (Three) Times a Day As Needed.      amLODIPine (NORVASC) 5 MG tablet Take 1 tablet by mouth Daily.      aspirin 81 MG tablet Take 1 tablet by mouth Daily.      atenolol (TENORMIN) 50 MG tablet Take 1 tablet by mouth Daily. 30 tablet  "6    atorvastatin (LIPITOR) 80 MG tablet Take 1 tablet by mouth. 5 times weekly      ezetimibe (ZETIA) 10 MG tablet Take 1 tablet by mouth Daily.      fenofibrate (TRICOR) 145 MG tablet Take 1 tablet by mouth Daily.      Fluzone High-Dose Quadrivalent 0.7 ML suspension prefilled syringe       Insulin Degludec (TRESIBA FLEXTOUCH) 200 UNIT/ML solution pen-injector pen injection Inject 16 Units under the skin into the appropriate area as directed every night at bedtime. 16 units SQ at bedtime 2 pen 4    Insulin Pen Needle (BD PEN NEEDLE SOCO U/F) 32G X 4 MM misc 4 times daily 200 each 3    isosorbide mononitrate (IMDUR) 30 MG 24 hr tablet TAKE 1 TABLET BY MOUTH DAILY 30 tablet 4    meloxicam (MOBIC) 15 MG tablet Take 1 tablet by mouth Daily.      nitrofurantoin, macrocrystal-monohydrate, (MACROBID) 100 MG capsule       NovoLOG FlexPen 100 UNIT/ML solution pen-injector sc pen 15 UNITS SQ TID WITH MEALS 60 mL 1    ONETOUCH DELICA LANCETS 33G misc Use.      pantoprazole (PROTONIX) 40 MG EC tablet Take 1 tablet by mouth 2 (Two) Times a Day. 60 tablet 12    SYNTHROID 50 MCG tablet Take 1 tablet by mouth Daily. 30 tablet 6    triamterene (DYRENIUM) 50 MG capsule       vitamin D3 125 MCG (5000 UT) capsule capsule Take 1 capsule by mouth Daily.      zolpidem (AMBIEN) 10 MG tablet Take 1 tablet by mouth Every Night.       No current facility-administered medications on file prior to visit.       Allergies   Allergen Reactions    Topamax [Topiramate] Unknown - Low Severity       Vitals:    12/07/23 1128   Height: 152.4 cm (60\")     Body mass index is 22.17 kg/m².   Constitutional:       Appearance: Well-developed.   Eyes:      General: No scleral icterus.     Conjunctiva/sclera: Conjunctivae normal.   HENT:      Head: Normocephalic and atraumatic.   Neck:      Thyroid: No thyromegaly.      Vascular: Normal carotid pulses. No carotid bruit, hepatojugular reflux or JVD.      Trachea: No tracheal deviation.   Pulmonary:      " Effort: No respiratory distress.      Breath sounds: Normal breath sounds. No decreased breath sounds. No wheezing. No rhonchi. No rales.   Chest:      Chest wall: Not tender to palpatation.   Cardiovascular:      Normal rate. Regular rhythm.      No gallop.    Pulses:     Carotid: 2+ bilaterally.     Radial: 2+ bilaterally.     Femoral: 2+ bilaterally.     Dorsalis pedis: 2+ bilaterally.     Posterior tibial: 2+ bilaterally.  Edema:     Peripheral edema absent.   Abdominal:      General: Bowel sounds are normal. There is no distension.      Palpations: Abdomen is soft.      Tenderness: There is no abdominal tenderness.   Musculoskeletal:         General: No deformity.      Cervical back: Normal range of motion and neck supple. Skin:     Findings: No erythema or rash.   Neurological:      Mental Status: Alert and oriented to person, place, and time.      Sensory: No sensory deficit.   Psychiatric:         Behavior: Behavior normal.         Lab Results   Component Value Date    WBC 7.36 11/07/2023    HGB 11.3 (L) 11/07/2023    HCT 33.8 (L) 11/07/2023    MCV 91.6 11/07/2023     11/07/2023       Lab Results   Component Value Date    GLUCOSE 136 (H) 11/07/2023    BUN 24 (H) 11/07/2023    CREATININE 1.13 (H) 11/07/2023    EGFR 50.8 (L) 11/07/2023    BCR 21.2 11/07/2023    K 4.6 11/07/2023    CO2 24.6 11/07/2023    CALCIUM 9.0 11/07/2023    PROTENTOTREF 6.1 02/04/2021    ALBUMIN 3.5 11/07/2023    BILITOT 0.5 11/07/2023    AST 21 11/07/2023    ALT 15 11/07/2023       Lab Results   Component Value Date    GLUCOSE 136 (H) 11/07/2023    CALCIUM 9.0 11/07/2023     11/07/2023    K 4.6 11/07/2023    CO2 24.6 11/07/2023     (H) 11/07/2023    BUN 24 (H) 11/07/2023    CREATININE 1.13 (H) 11/07/2023    EGFR 50.8 (L) 11/07/2023    BCR 21.2 11/07/2023    ANIONGAP 11.4 11/07/2023       Lab Results   Component Value Date    CHLPL 155 02/04/2021    TRIG 115 02/04/2021    HDL 47 02/04/2021    LDL 87 02/04/2021          ECG 12 Lead    Date/Time: 12/7/2023 11:45 AM  Performed by: Joby Mullins MD    Authorized by: Joby Mullins MD  Comparison: compared with previous ECG from 10/18/2023  Similar to previous ECG  Rhythm: sinus rhythm  Rate: normal  QRS axis: normal    Clinical impression: normal ECG           10/30/23    No ECG evidence of myocardial ischemia.    Negative clinical evidence of myocardial ischemia.    Findings consistent with a normal ECG stress test.    Poor functional capacity with patient only able to complete 3 minutes 30 seconds of Emery treadmill protocol.  No angina.    Results for orders placed during the hospital encounter of 06/29/22    Adult Transthoracic Echo Complete W/ Cont if Necessary Per Protocol    Interpretation Summary  · Calculated left ventricular EF = 53.8% Estimated left ventricular EF was in agreement with the calculated left ventricular EF. Left ventricular systolic function is normal.  · There is moderate calcification of the aortic valve.  · Severe mitral annular calcification is present. There is severe calcification of the mitral valve posterior leaflet(s).  · Mild aortic valve regurgitation is present.  · Mild mitral valve regurgitation is present.  · Mild tricuspid valve regurgitation is present.  · Estimated right ventricular systolic pressure from tricuspid regurgitation is normal (<35 mmHg). Calculated right ventricular systolic pressure from tricuspid regurgitation is 21.4 mmHg.          Stress test June 2022:  Findings consistent with a normal ECG stress test.  Left ventricular ejection fraction is hyperdynamic (Calculated EF > 70%). .  Myocardial perfusion imaging indicates a small-sized, mildly severe area of ischemia located in the mid to distal anterior wall.  Impressions are consistent with an intermediate risk study.  There is no prior study available for comparison.       1/10/2014.  CINEANGIOGRAPHY:   1.  Left Main: The left main coronary artery was  free of disease.   2.  Left Anterior Descending: The left anterior descending coronary artery was free of significant disease. The major diagonal branch arising from the proximal segment was narrowed at approximately 50%, and that was in the mid-segment. The remainder of the vessel was free of disease.   3.  Left Circumflex: The left circumflex coronary artery and obtuse marginal branches are free of disease.   4.  Right Coronary Artery: The right coronary artery, posterior descending, and posterolateral branches are free of disease.         LEFT VENTRICULOGRAPHY: Overall size of the ventricle is normal. Contractility is within normal limits.      DISCUSSION/SUMMARY  Very pleasant 73-year-old female who presents to me secondary to fatigue with activity and mild dyspnea on exertion.  She states that her symptoms have improved.  Her ejection fraction is normal and she only has mild regurgitation of the mitral, tricuspid, and aortic valve.  Her last stress test showed poor exercise capacity but no evidence of ischemia.  She denies any recent issues with chest pain     1.  Fatigue with activity/ROBLES  -Treadmill stress test with poor functional capacity but no evidence of ischemia.  2023.  -She did have an echo and stress test performed in 2022 and both looked fine.  She just had mild valvular heart disease and a preserved ejection fraction.  The slight abnormality of the anterior wall of her stress test looks to be artifact to me.     2.  Hyperlipidemia: Continue atorvastatin and Zetia therapy.  No changes indicated.     3.  Essential hypertension: Mildly elevated today.  I would not recommend increases in therapy at this time.    I will see her back in 1 year.

## 2023-12-08 ENCOUNTER — HOSPITAL ENCOUNTER (OUTPATIENT)
Dept: ULTRASOUND IMAGING | Facility: HOSPITAL | Age: 73
Discharge: HOME | End: 2023-12-08
Payer: MEDICARE

## 2023-12-08 ENCOUNTER — HOSPITAL ENCOUNTER (OUTPATIENT)
Dept: MAMMOGRAPHY | Facility: HOSPITAL | Age: 73
Discharge: HOME | End: 2023-12-08
Payer: MEDICARE

## 2023-12-08 VITALS
BODY MASS INDEX: 21.79 KG/M2 | WEIGHT: 111 LBS | RESPIRATION RATE: 17 BRPM | HEIGHT: 60 IN | SYSTOLIC BLOOD PRESSURE: 157 MMHG | TEMPERATURE: 98.4 F | DIASTOLIC BLOOD PRESSURE: 78 MMHG | HEART RATE: 75 BPM | OXYGEN SATURATION: 100 %

## 2023-12-08 DIAGNOSIS — N63.14 MASS OF LOWER INNER QUADRANT OF RIGHT BREAST: ICD-10-CM

## 2023-12-08 PROCEDURE — 88305 TISSUE EXAM BY PATHOLOGIST: CPT | Performed by: RADIOLOGY

## 2023-12-08 PROCEDURE — 88342 IMHCHEM/IMCYTCHM 1ST ANTB: CPT | Performed by: RADIOLOGY

## 2023-12-08 PROCEDURE — 77065 DX MAMMO INCL CAD UNI: CPT

## 2023-12-08 PROCEDURE — 25010000002 LIDOCAINE 1 % SOLUTION: Performed by: RADIOLOGY

## 2023-12-08 PROCEDURE — A4648 IMPLANTABLE TISSUE MARKER: HCPCS

## 2023-12-08 RX ORDER — LIDOCAINE HYDROCHLORIDE 10 MG/ML
10 INJECTION, SOLUTION INFILTRATION; PERINEURAL ONCE
Status: COMPLETED | OUTPATIENT
Start: 2023-12-08 | End: 2023-12-08

## 2023-12-08 RX ADMIN — LIDOCAINE HYDROCHLORIDE 8 ML: 10; .005 INJECTION, SOLUTION EPIDURAL; INFILTRATION; INTRACAUDAL; PERINEURAL at 15:04

## 2023-12-08 RX ADMIN — LIDOCAINE HYDROCHLORIDE 2 ML: 10 INJECTION, SOLUTION INFILTRATION; PERINEURAL at 15:02

## 2023-12-08 NOTE — H&P
Name: Shirin Washington ADMIT: 2023   : 1950  PCP: Duncan Domingo MD    MRN: [id number] LOS: 0 days   AGE/SEX: 73 y.o. female  ROOM: Room/bed info not found       Chief complaint R breast mass    Present Illness or Internal History:  Patient is a 73 y.o. female presents with R breast mass for US bx.     Past Surgical History:  Past Surgical History:   Procedure Laterality Date    BACK SURGERY  2015    L4-L5 laminectomy, L4-L5, L5-S1 fusion by Dr. Urbina    CARDIAC CATHETERIZATION      ARTERIAL CATHETERIZATION    CATARACT EXTRACTION      CHOLECYSTECTOMY      COLONOSCOPY  2/15    KNEE SURGERY Right 2009    Arthroscopy    TUBAL ABDOMINAL LIGATION      UPPER GASTROINTESTINAL ENDOSCOPY      VAGINAL HYSTERECTOMY      fibroids, abnormal bleeding       Past Medical History:  Past Medical History:   Diagnosis Date    Abnormal ECG ?    Anemia     Arrhythmia 20 yrs ago    Fast heartbeat take atenolol    Arthritis     Asthma As a child    Outgrew it as an adult    Back pain     Background diabetic retinopathy of right eye determined by examination     Bilateral carotid artery stenosis     Cataract     Coronary atherosclerosis     cath 2014 with 50% mid LAD, otherwise unremarkable    COVID-19 07/15/2022    Diabetes mellitus type 2, insulin dependent     Esophageal varices     Dint know whatvthat means but have been told i have a significant hiatal    Gastroparesis due to secondary diabetes     GERD (gastroesophageal reflux disease)     History of spinal stenosis     History of spondylolisthesis     History of URI (upper respiratory infection)     Hypercholesteremia     Hyperlipidemia     Hypertension     Hypothyroidism Approx 2017    Iron deficiency anemia     Irritable bowel syndrome Sometimes    Left-sided weakness     ARM AND LEG    Leukocytosis     MILD    Peripheral neuropathy     Postmenopausal osteoporosis 2017    Ulcer 2014    They saw a healing ulcer    Vertigo      Vitamin D deficiency        Home Medications:  (Not in a hospital admission)      Allergies:  Shingrix [zoster vac recomb adjuvanted] and Topamax [topiramate]    Family History:  Family History   Problem Relation Age of Onset    Polycythemia Mother         Progressed to secondary myelofibrosis    Cancer Mother          of mylofibrosis. Has polycythemia & then mylo    Hypertension Mother     Hyperlipidemia Mother     Cirrhosis Father          ar age 39    Liver disease Father     Alcohol abuse Father          at age 39    Diabetes Other     Hypertension Other     Cancer Sister         Squamish cell on back    Diabetes Maternal Grandfather     Asthma Paternal Grandfather     Heart disease Maternal Grandmother         Valve leakage    Cancer Neg Hx     Breast cancer Neg Hx     Ovarian cancer Neg Hx     Uterine cancer Neg Hx     Colon cancer Neg Hx     Deep vein thrombosis Neg Hx     Pulmonary embolism Neg Hx        Social History:  Social History     Tobacco Use    Smoking status: Never    Smokeless tobacco: Never   Vaping Use    Vaping Use: Never used   Substance Use Topics    Alcohol use: Not Currently     Comment: on occasion    Drug use: No        Objective     Physical Exam:    No exam performed today,    Vital Signs  Temp:  [98.4 °F (36.9 °C)] 98.4 °F (36.9 °C)  Heart Rate:  [68] 68  Resp:  [17] 17  BP: (141)/(68) 141/68    Anticipated Surgical Procedure:  Right breast ultrasound guided core needle biopsy    The risks, benefits and alternatives of this procedure have been discussed with the patient or responsible party: Yes        Mariya Ortega MD  23  14:39 EST

## 2023-12-08 NOTE — NURSING NOTE
Biopsy site to right lower breast clear with Exofin dry and intact. No firmness or swelling noted at or around biopsy site. Denies pain. Ice pack with protective covering applied to biopsy site. Discharge instructions discussed with understanding voiced by patient. Copies provided to patient. No distress noted. To home via personal vehicle with spouse.

## 2023-12-09 ENCOUNTER — TELEPHONE (OUTPATIENT)
Dept: INTERVENTIONAL RADIOLOGY/VASCULAR | Facility: HOSPITAL | Age: 73
End: 2023-12-09
Payer: MEDICARE

## 2023-12-11 LAB
LAB AP CASE REPORT: NORMAL
LAB AP CLINICAL INFORMATION: NORMAL
LAB AP SPECIAL STAINS: NORMAL
PATH REPORT.FINAL DX SPEC: NORMAL
PATH REPORT.GROSS SPEC: NORMAL

## 2023-12-29 ENCOUNTER — TRANSCRIBE ORDERS (OUTPATIENT)
Dept: ADMINISTRATIVE | Facility: HOSPITAL | Age: 73
End: 2023-12-29
Payer: MEDICARE

## 2023-12-29 ENCOUNTER — LAB (OUTPATIENT)
Dept: OTHER | Facility: HOSPITAL | Age: 73
End: 2023-12-29
Payer: MEDICARE

## 2023-12-29 ENCOUNTER — INFUSION (OUTPATIENT)
Dept: ONCOLOGY | Facility: HOSPITAL | Age: 73
End: 2023-12-29
Payer: MEDICARE

## 2023-12-29 VITALS — WEIGHT: 111.8 LBS | BODY MASS INDEX: 21.95 KG/M2 | RESPIRATION RATE: 15 BRPM | TEMPERATURE: 97.7 F | HEIGHT: 60 IN

## 2023-12-29 DIAGNOSIS — M81.0 OSTEOPOROSIS, POST-MENOPAUSAL: ICD-10-CM

## 2023-12-29 DIAGNOSIS — M81.0 AGE-RELATED OSTEOPOROSIS WITHOUT CURRENT PATHOLOGICAL FRACTURE: Primary | ICD-10-CM

## 2023-12-29 DIAGNOSIS — M81.0 OSTEOPOROSIS WITHOUT CURRENT PATHOLOGICAL FRACTURE, UNSPECIFIED OSTEOPOROSIS TYPE: Primary | ICD-10-CM

## 2023-12-29 DIAGNOSIS — M81.0 OSTEOPOROSIS, UNSPECIFIED OSTEOPOROSIS TYPE, UNSPECIFIED PATHOLOGICAL FRACTURE PRESENCE: ICD-10-CM

## 2023-12-29 LAB
ALBUMIN SERPL-MCNC: 4.1 G/DL (ref 3.5–5.2)
ALBUMIN/GLOB SERPL: 1.9 G/DL
ALP SERPL-CCNC: 41 U/L (ref 39–117)
ALT SERPL W P-5'-P-CCNC: 19 U/L (ref 1–33)
ANION GAP SERPL CALCULATED.3IONS-SCNC: 10.1 MMOL/L (ref 5–15)
AST SERPL-CCNC: 27 U/L (ref 1–32)
BILIRUB SERPL-MCNC: 0.6 MG/DL (ref 0–1.2)
BUN SERPL-MCNC: 22 MG/DL (ref 8–23)
BUN/CREAT SERPL: 20.8 (ref 7–25)
CALCIUM SPEC-SCNC: 9.8 MG/DL (ref 8.6–10.5)
CHLORIDE SERPL-SCNC: 103 MMOL/L (ref 98–107)
CO2 SERPL-SCNC: 25.9 MMOL/L (ref 22–29)
CREAT SERPL-MCNC: 1.06 MG/DL (ref 0.57–1)
EGFRCR SERPLBLD CKD-EPI 2021: 54.9 ML/MIN/1.73
GLOBULIN UR ELPH-MCNC: 2.2 GM/DL
GLUCOSE SERPL-MCNC: 120 MG/DL (ref 65–99)
MAGNESIUM SERPL-MCNC: 1.9 MG/DL (ref 1.6–2.4)
PHOSPHATE SERPL-MCNC: 3.7 MG/DL (ref 2.5–4.5)
POTASSIUM SERPL-SCNC: 4.2 MMOL/L (ref 3.5–5.2)
PROT SERPL-MCNC: 6.3 G/DL (ref 6–8.5)
SODIUM SERPL-SCNC: 139 MMOL/L (ref 136–145)

## 2023-12-29 PROCEDURE — 96372 THER/PROPH/DIAG INJ SC/IM: CPT

## 2023-12-29 PROCEDURE — 80053 COMPREHEN METABOLIC PANEL: CPT | Performed by: INTERNAL MEDICINE

## 2023-12-29 PROCEDURE — 25010000002 DENOSUMAB 60 MG/ML SOLUTION PREFILLED SYRINGE: Performed by: INTERNAL MEDICINE

## 2023-12-29 PROCEDURE — 84100 ASSAY OF PHOSPHORUS: CPT | Performed by: INTERNAL MEDICINE

## 2023-12-29 PROCEDURE — 83735 ASSAY OF MAGNESIUM: CPT | Performed by: INTERNAL MEDICINE

## 2023-12-29 PROCEDURE — 36415 COLL VENOUS BLD VENIPUNCTURE: CPT

## 2023-12-29 RX ADMIN — DENOSUMAB 60 MG: 60 INJECTION SUBCUTANEOUS at 13:26

## 2023-12-29 NOTE — NURSING NOTE
Patient arrived for prolia injection. Indication and side effects reviewed. Denies recent/upcoming dental work/jaw bone pain. Labs and medications verified. Prolia administered in right arm without incidence. Instructed to call prescribing MD for any concerns or questions and instructed on how to schedule future appts.  Patient verbalizes understanding and discharged in stable condition.

## 2024-01-16 ENCOUNTER — HOSPITAL ENCOUNTER (OUTPATIENT)
Facility: HOSPITAL | Age: 74
Discharge: HOME | End: 2024-01-16
Admitting: INTERNAL MEDICINE
Payer: MEDICARE

## 2024-01-16 DIAGNOSIS — M81.0 OSTEOPOROSIS WITHOUT CURRENT PATHOLOGICAL FRACTURE, UNSPECIFIED OSTEOPOROSIS TYPE: ICD-10-CM

## 2024-01-16 PROCEDURE — 77080 DXA BONE DENSITY AXIAL: CPT

## 2024-05-14 ENCOUNTER — LAB (OUTPATIENT)
Dept: LAB | Facility: HOSPITAL | Age: 74
End: 2024-05-14
Payer: MEDICARE

## 2024-05-14 ENCOUNTER — OFFICE VISIT (OUTPATIENT)
Dept: ONCOLOGY | Facility: CLINIC | Age: 74
End: 2024-05-14
Payer: MEDICARE

## 2024-05-14 VITALS
OXYGEN SATURATION: 96 % | BODY MASS INDEX: 22.15 KG/M2 | DIASTOLIC BLOOD PRESSURE: 68 MMHG | SYSTOLIC BLOOD PRESSURE: 184 MMHG | WEIGHT: 112.8 LBS | HEART RATE: 69 BPM | TEMPERATURE: 97.9 F | RESPIRATION RATE: 18 BRPM | HEIGHT: 60 IN

## 2024-05-14 DIAGNOSIS — D64.9 NORMOCYTIC ANEMIA: ICD-10-CM

## 2024-05-14 DIAGNOSIS — D64.9 NORMOCYTIC ANEMIA: Primary | ICD-10-CM

## 2024-05-14 DIAGNOSIS — E53.8 B12 DEFICIENCY: ICD-10-CM

## 2024-05-14 LAB
ALBUMIN SERPL-MCNC: 3.8 G/DL (ref 3.5–5.2)
ALBUMIN/GLOB SERPL: 1.6 G/DL
ALP SERPL-CCNC: 36 U/L (ref 39–117)
ALT SERPL W P-5'-P-CCNC: 28 U/L (ref 1–33)
ANION GAP SERPL CALCULATED.3IONS-SCNC: 9.9 MMOL/L (ref 5–15)
AST SERPL-CCNC: 34 U/L (ref 1–32)
BASOPHILS # BLD AUTO: 0.05 10*3/MM3 (ref 0–0.2)
BASOPHILS NFR BLD AUTO: 0.7 % (ref 0–1.5)
BILIRUB SERPL-MCNC: 0.6 MG/DL (ref 0–1.2)
BUN SERPL-MCNC: 28 MG/DL (ref 8–23)
BUN/CREAT SERPL: 26.4 (ref 7–25)
CALCIUM SPEC-SCNC: 10.1 MG/DL (ref 8.6–10.5)
CHLORIDE SERPL-SCNC: 104 MMOL/L (ref 98–107)
CO2 SERPL-SCNC: 26.1 MMOL/L (ref 22–29)
CREAT SERPL-MCNC: 1.06 MG/DL (ref 0.57–1)
DEPRECATED RDW RBC AUTO: 47.7 FL (ref 37–54)
EGFRCR SERPLBLD CKD-EPI 2021: 54.9 ML/MIN/1.73
EOSINOPHIL # BLD AUTO: 0.47 10*3/MM3 (ref 0–0.4)
EOSINOPHIL NFR BLD AUTO: 6.4 % (ref 0.3–6.2)
ERYTHROCYTE [DISTWIDTH] IN BLOOD BY AUTOMATED COUNT: 14.2 % (ref 12.3–15.4)
FERRITIN SERPL-MCNC: 654 NG/ML (ref 13–150)
GLOBULIN UR ELPH-MCNC: 2.4 GM/DL
GLUCOSE SERPL-MCNC: 103 MG/DL (ref 65–99)
HCT VFR BLD AUTO: 35.7 % (ref 34–46.6)
HGB BLD-MCNC: 11.7 G/DL (ref 12–15.9)
HGB RETIC QN AUTO: 34 PG (ref 29.8–36.1)
IMM GRANULOCYTES # BLD AUTO: 0.04 10*3/MM3 (ref 0–0.05)
IMM GRANULOCYTES NFR BLD AUTO: 0.5 % (ref 0–0.5)
IMM RETICS NFR: 10.6 % (ref 3–15.8)
IRON 24H UR-MRATE: 67 MCG/DL (ref 37–145)
IRON SATN MFR SERPL: 15 % (ref 20–50)
LYMPHOCYTES # BLD AUTO: 1.38 10*3/MM3 (ref 0.7–3.1)
LYMPHOCYTES NFR BLD AUTO: 18.9 % (ref 19.6–45.3)
MCH RBC QN AUTO: 29.8 PG (ref 26.6–33)
MCHC RBC AUTO-ENTMCNC: 32.8 G/DL (ref 31.5–35.7)
MCV RBC AUTO: 90.8 FL (ref 79–97)
MONOCYTES # BLD AUTO: 0.61 10*3/MM3 (ref 0.1–0.9)
MONOCYTES NFR BLD AUTO: 8.4 % (ref 5–12)
NEUTROPHILS NFR BLD AUTO: 4.74 10*3/MM3 (ref 1.7–7)
NEUTROPHILS NFR BLD AUTO: 65.1 % (ref 42.7–76)
NRBC BLD AUTO-RTO: 0 /100 WBC (ref 0–0.2)
PLATELET # BLD AUTO: 240 10*3/MM3 (ref 140–450)
PMV BLD AUTO: 10.4 FL (ref 6–12)
POTASSIUM SERPL-SCNC: 4.1 MMOL/L (ref 3.5–5.2)
PROT SERPL-MCNC: 6.2 G/DL (ref 6–8.5)
RBC # BLD AUTO: 3.93 10*6/MM3 (ref 3.77–5.28)
RETICS # AUTO: 0.09 10*6/MM3 (ref 0.02–0.13)
RETICS/RBC NFR AUTO: 2.18 % (ref 0.7–1.9)
SODIUM SERPL-SCNC: 140 MMOL/L (ref 136–145)
TIBC SERPL-MCNC: 441 MCG/DL (ref 298–536)
TRANSFERRIN SERPL-MCNC: 296 MG/DL (ref 200–360)
VIT B12 BLD-MCNC: 368 PG/ML (ref 211–946)
WBC NRBC COR # BLD AUTO: 7.29 10*3/MM3 (ref 3.4–10.8)

## 2024-05-14 PROCEDURE — 36415 COLL VENOUS BLD VENIPUNCTURE: CPT

## 2024-05-14 PROCEDURE — 83540 ASSAY OF IRON: CPT

## 2024-05-14 PROCEDURE — 84466 ASSAY OF TRANSFERRIN: CPT

## 2024-05-14 PROCEDURE — 80053 COMPREHEN METABOLIC PANEL: CPT

## 2024-05-14 PROCEDURE — 3077F SYST BP >= 140 MM HG: CPT | Performed by: INTERNAL MEDICINE

## 2024-05-14 PROCEDURE — 82607 VITAMIN B-12: CPT | Performed by: INTERNAL MEDICINE

## 2024-05-14 PROCEDURE — 85025 COMPLETE CBC W/AUTO DIFF WBC: CPT

## 2024-05-14 PROCEDURE — 1159F MED LIST DOCD IN RCRD: CPT | Performed by: INTERNAL MEDICINE

## 2024-05-14 PROCEDURE — 85046 RETICYTE/HGB CONCENTRATE: CPT

## 2024-05-14 PROCEDURE — 99214 OFFICE O/P EST MOD 30 MIN: CPT | Performed by: INTERNAL MEDICINE

## 2024-05-14 PROCEDURE — 1125F AMNT PAIN NOTED PAIN PRSNT: CPT | Performed by: INTERNAL MEDICINE

## 2024-05-14 PROCEDURE — 82728 ASSAY OF FERRITIN: CPT

## 2024-05-14 PROCEDURE — 1160F RVW MEDS BY RX/DR IN RCRD: CPT | Performed by: INTERNAL MEDICINE

## 2024-05-14 PROCEDURE — 3078F DIAST BP <80 MM HG: CPT | Performed by: INTERNAL MEDICINE

## 2024-05-14 RX ORDER — ESTRADIOL 0.1 MG/G
CREAM VAGINAL
COMMUNITY
Start: 2024-05-08

## 2024-05-14 RX ORDER — NITROFURANTOIN 25; 75 MG/1; MG/1
100 CAPSULE ORAL
COMMUNITY

## 2024-05-14 NOTE — PROGRESS NOTES
"[facility] GROUP OUTPATIENT FOLLOW UP CLINIC VISIT    REASON FOR FOLLOW-UP:    1.  Iron deficiency anemia.  Her last intravenous Feraheme infusions or in October 2015.  Injectafer administered on 1/4/2019.  Recurrent iron deficiency with 2 doses of Injectafer on 8/26/2020 and 9/2/2020.    HISTORY OF PRESENT ILLNESS:  Shirin Washington is a 73 y.o. female who returns today for follow up of the above issue.    Left-sided low back pain is getting worse.  She has been anxious about some things recently.  Her credit card was compromised.  Gastroparesis continues to give her some trouble.  Blood glucose levels are under good control.    REVIEW OF SYSTEMS:  See the HPI    Vitals:    05/14/24 1516   BP: (!) 184/68  Comment: 184/66 manual   Pulse: 69   Resp: 18   Temp: 97.9 °F (36.6 °C)   TempSrc: Oral   SpO2: 96%   Weight: 51.2 kg (112 lb 12.8 oz)   Height: 152.4 cm (60\")   PainSc:   6   PainLoc: Back         PHYSICAL EXAMINATION:  General:  No acute distress, awake, alert and oriented  Skin:  Warm and dry, no visible rash  HEENT:  Normocephalic/atraumatic.   Chest:  Normal respiratory effort.  Lungs clear to auscultation bilaterally.  Heart: Regular rate and rhythm  Extremities:  No visible clubbing, cyanosis, or edema  Neuro/psych:  Grossly nonfocal.  Normal mood and affect.       DIAGNOSTIC DATA:  CBC & Differential (05/14/2024 15:06)  Retic With IRF & RET-He (05/14/2024 15:06)      ASSESSMENT:  This is a 73 y.o. female with:    *History of iron deficiency anemia: She required intravenous Feraheme last in October 2015 and we gave 2 more doses in September and October 2017.  No obvious GI blood loss.  I suspect she has some malabsorption.  She has symptoms consistent with gastroparesis.  She also received vitamin B12 injections in the past and she received 2 injections with her Feraheme infusions.  At the end of 2018 her ferritin had dropped considerably.  Therefore, we proceeded with one dose of " Injectafer.  Ferritin was 23 with a hgb of 11.8 on 8/19/2020. She received two doses of IV Injectafer on 8/26 and 9/2/2020.  5/14/2024: Hemoglobin mildly low at 11.7    *Gastroparesis: Due to diabetes.  Stable but persistent symptoms.      *B12 deficiency: Vitamin B12 level pending today    *Chronic back pain: She has been getting steroid injections.  Left low back is hurting more.    *Leukocytosis with neutrophilia  White blood cell count normal at 7.29    *Fatigue    PLAN:  Follow-up pending labs from today.  Assuming everything looks okay I will see her back in 6 months for follow-up  IV iron if needed for worsening iron deficiency    I spent 35 minutes in this visit today reviewing her record, communicating with her, examining her, placing orders, documenting the encounter.

## 2024-07-02 ENCOUNTER — LAB (OUTPATIENT)
Dept: OTHER | Facility: HOSPITAL | Age: 74
End: 2024-07-02
Payer: MEDICARE

## 2024-07-02 ENCOUNTER — INFUSION (OUTPATIENT)
Dept: ONCOLOGY | Facility: HOSPITAL | Age: 74
End: 2024-07-02
Payer: MEDICARE

## 2024-07-02 DIAGNOSIS — M81.0 OSTEOPOROSIS, UNSPECIFIED OSTEOPOROSIS TYPE, UNSPECIFIED PATHOLOGICAL FRACTURE PRESENCE: ICD-10-CM

## 2024-07-02 DIAGNOSIS — M81.0 OSTEOPOROSIS, POST-MENOPAUSAL: Primary | ICD-10-CM

## 2024-07-02 DIAGNOSIS — M81.0 OSTEOPOROSIS, POST-MENOPAUSAL: ICD-10-CM

## 2024-07-02 LAB
ALBUMIN SERPL-MCNC: 4.1 G/DL (ref 3.5–5.2)
ALBUMIN/GLOB SERPL: 1.9 G/DL
ALP SERPL-CCNC: 43 U/L (ref 39–117)
ALT SERPL W P-5'-P-CCNC: 20 U/L (ref 1–33)
ANION GAP SERPL CALCULATED.3IONS-SCNC: 7.4 MMOL/L (ref 5–15)
AST SERPL-CCNC: 21 U/L (ref 1–32)
BILIRUB SERPL-MCNC: 0.7 MG/DL (ref 0–1.2)
BUN SERPL-MCNC: 29 MG/DL (ref 8–23)
BUN/CREAT SERPL: 26.1 (ref 7–25)
CALCIUM SPEC-SCNC: 10.3 MG/DL (ref 8.6–10.5)
CHLORIDE SERPL-SCNC: 105 MMOL/L (ref 98–107)
CO2 SERPL-SCNC: 30.6 MMOL/L (ref 22–29)
CREAT SERPL-MCNC: 1.11 MG/DL (ref 0.57–1)
EGFRCR SERPLBLD CKD-EPI 2021: 51.6 ML/MIN/1.73
GLOBULIN UR ELPH-MCNC: 2.2 GM/DL
GLUCOSE SERPL-MCNC: 132 MG/DL (ref 65–99)
MAGNESIUM SERPL-MCNC: 1.9 MG/DL (ref 1.6–2.4)
PHOSPHATE SERPL-MCNC: 3.5 MG/DL (ref 2.5–4.5)
POTASSIUM SERPL-SCNC: 4.3 MMOL/L (ref 3.5–5.2)
PROT SERPL-MCNC: 6.3 G/DL (ref 6–8.5)
SODIUM SERPL-SCNC: 143 MMOL/L (ref 136–145)

## 2024-07-02 PROCEDURE — 84100 ASSAY OF PHOSPHORUS: CPT | Performed by: INTERNAL MEDICINE

## 2024-07-02 PROCEDURE — 83735 ASSAY OF MAGNESIUM: CPT | Performed by: INTERNAL MEDICINE

## 2024-07-02 PROCEDURE — 80053 COMPREHEN METABOLIC PANEL: CPT | Performed by: INTERNAL MEDICINE

## 2024-07-02 PROCEDURE — 96372 THER/PROPH/DIAG INJ SC/IM: CPT

## 2024-07-02 PROCEDURE — 25010000002 DENOSUMAB 60 MG/ML SOLUTION PREFILLED SYRINGE: Performed by: INTERNAL MEDICINE

## 2024-07-02 RX ADMIN — DENOSUMAB 60 MG: 60 INJECTION SUBCUTANEOUS at 09:51

## 2024-07-02 NOTE — NURSING NOTE
Arrived  for prolia injection. Indication and side effects reviewed. Denies recent dental work. Labs and medications verified. Prolia administered in R arm without incidence. Instructed to call prescribing MD for any concerns or questions and instructed on how to schedule future appts.  Pt vu and discharged in stable condition.    PT ADVISED TO MAKE RTN APPT FOR JAN. PT V/U.

## 2024-08-09 RX ORDER — PANTOPRAZOLE SODIUM 40 MG/1
40 TABLET, DELAYED RELEASE ORAL 2 TIMES DAILY
Qty: 60 TABLET | Refills: 12 | OUTPATIENT
Start: 2024-08-09

## 2024-08-12 ENCOUNTER — TELEPHONE (OUTPATIENT)
Dept: GASTROENTEROLOGY | Facility: CLINIC | Age: 74
End: 2024-08-12
Payer: MEDICARE

## 2024-08-12 RX ORDER — PANTOPRAZOLE SODIUM 40 MG/1
40 TABLET, DELAYED RELEASE ORAL 2 TIMES DAILY
Qty: 60 TABLET | Refills: 0 | Status: SHIPPED | OUTPATIENT
Start: 2024-08-12

## 2024-08-12 NOTE — TELEPHONE ENCOUNTER
"Caller: FelixMaritza smitharet \"Rosi\"    Relationship: Self    Best call back number: [phone]    Requested Prescriptions:   pantoprazole (PROTONIX) 40 MG EC tablet      Pharmacy where request should be sent:    [facility]    Last office visit with prescribing clinician: 7/3/2023   Last telemedicine visit with prescribing clinician: Visit date not found   Next office visit with prescribing clinician: 9/9/2024 WITH JAMEEL     Additional details provided by patient: PT IS NEEDING REFILL TILL HER APPT WITH JAMEEL ON 9/9/2024. PT IS OUT OF MEDICINE TODAY.     Does the patient have less than a 3 day supply:  [x] Yes  [] No    Would you like a call back once the refill request has been completed: [x] Yes [] No    If the office needs to give you a call back, can they leave a voicemail: [x] Yes [] No    Darrel Hassan   08/12/24 09:14 EDT       "

## 2024-09-03 ENCOUNTER — TELEPHONE (OUTPATIENT)
Dept: GASTROENTEROLOGY | Facility: CLINIC | Age: 74
End: 2024-09-03
Payer: MEDICARE

## 2024-09-03 NOTE — TELEPHONE ENCOUNTER
"Caller: Shirin Washington \"Rosi\"    Relationship to patient: Self    Best call back number: [phone]     Patient is needing: PT RECEIVED CHARGE BEYOND INSURANCE CO-PAY, BELIEVES THAT THIS WAS RECEIVED IN ERROR. SHE SPOKE WITH BILLING OFFICE, THEY ADVISED THAT SHE SPEAK WITH OFFICE PRACTICE MANAGER. UNABLE TO WT, PLEASE CALL TO ADVISE WHEN NEXT AVAILABLE.     "

## 2024-09-03 NOTE — TELEPHONE ENCOUNTER
Called and spoke with patient. She states that e-check in wants her to pay a 200.00 copay.  Her copay for specialist should be 30.00.  Advised her not to pay it through echeck in.  Told her we will process the day of her appointment and if more than 30.00 then we will just bill her after insurance processes her claim.

## 2024-09-09 ENCOUNTER — OFFICE VISIT (OUTPATIENT)
Dept: GASTROENTEROLOGY | Facility: CLINIC | Age: 74
End: 2024-09-09
Payer: MEDICARE

## 2024-09-09 VITALS
SYSTOLIC BLOOD PRESSURE: 142 MMHG | HEIGHT: 60 IN | DIASTOLIC BLOOD PRESSURE: 84 MMHG | OXYGEN SATURATION: 97 % | HEART RATE: 66 BPM | TEMPERATURE: 97.9 F | BODY MASS INDEX: 23.05 KG/M2 | WEIGHT: 117.4 LBS

## 2024-09-09 DIAGNOSIS — K31.84 GASTROPARESIS: Primary | ICD-10-CM

## 2024-09-09 DIAGNOSIS — K21.9 GASTROESOPHAGEAL REFLUX DISEASE WITHOUT ESOPHAGITIS: ICD-10-CM

## 2024-09-09 DIAGNOSIS — Z87.11 HISTORY OF PEPTIC ULCER DISEASE: ICD-10-CM

## 2024-09-09 PROCEDURE — 1160F RVW MEDS BY RX/DR IN RCRD: CPT | Performed by: PHYSICIAN ASSISTANT

## 2024-09-09 PROCEDURE — 3077F SYST BP >= 140 MM HG: CPT | Performed by: PHYSICIAN ASSISTANT

## 2024-09-09 PROCEDURE — 1159F MED LIST DOCD IN RCRD: CPT | Performed by: PHYSICIAN ASSISTANT

## 2024-09-09 PROCEDURE — 99214 OFFICE O/P EST MOD 30 MIN: CPT | Performed by: PHYSICIAN ASSISTANT

## 2024-09-09 PROCEDURE — 3079F DIAST BP 80-89 MM HG: CPT | Performed by: PHYSICIAN ASSISTANT

## 2024-09-09 NOTE — PROGRESS NOTES
"Chief Complaint  gastroparesis    Subjective          History Of Present Illness:    Shirin Washington is a  74 y.o. female patient of Dr. Stiles who presents as a follow up for gastroparesis.     Patient reports her symptoms have improved with diet modification. She does find it difficult sometimes with her diabetes and diet options. Patient reports intermittent nausea. She feels like the nausea often occurs with high/low glucose. No vomiting. She remains on pantoprazole 40 mg twice daily. Patient is on daily meloxicam. Patient reports her bowels move inconsistently. She reports she can move her bowels regularly but occasionally does get constipated for a few days. No melena or hematochezia.  She has tried MiraLAX but this does cause her diarrhea.    Additional data reviewed:   EGD 7/20/23 - 1 cm hiatal hernia, single gastric polyp, non-bleeding gastric ulcer, normal duodenum. Hyperplastic gastric polyp, no H. Pylori. Nomal small bowel biopsies.     Objective   Vital Signs:   /84 (BP Location: Left arm, Patient Position: Sitting)   Pulse 66   Temp 97.9 °F (36.6 °C)   Ht 152.4 cm (60\")   Wt 53.3 kg (117 lb 6.4 oz)   SpO2 97%   BMI 22.93 kg/m²       Physical Exam     Result Review :   The following data was reviewed by: Alejandra Holly PA-C on 09/09/2024:  CMP          12/29/2023    12:41 5/14/2024    15:06 7/2/2024    09:11   CMP   Glucose 120  103  132    BUN 22  28  29    Creatinine 1.06  1.06  1.11    EGFR 54.9  54.9  51.6    Sodium 139  140  143    Potassium 4.2  4.1  4.3    Chloride 103  104  105    Calcium 9.8  10.1  10.3    Total Protein 6.3  6.2  6.3    Albumin 4.1  3.8  4.1    Globulin 2.2  2.4  2.2    Total Bilirubin 0.6  0.6  0.7    Alkaline Phosphatase 41  36  43    AST (SGOT) 27  34  21    ALT (SGPT) 19  28  20    Albumin/Globulin Ratio 1.9  1.6  1.9    BUN/Creatinine Ratio 20.8  26.4  26.1    Anion Gap 10.1  9.9  7.4      CBC          11/7/2023    13:28 5/14/2024    15:06   CBC   WBC " 7.36  7.29    RBC 3.69  3.93    Hemoglobin 11.3  11.7    Hematocrit 33.8  35.7    MCV 91.6  90.8    MCH 30.6  29.8    MCHC 33.4  32.8    RDW 14.8  14.2    Platelets 226  240            Assessment and Plan    Diagnoses and all orders for this visit:    1. Gastroparesis (Primary)    2. Gastroesophageal reflux disease without esophagitis    3. History of peptic ulcer disease       Continue gastroparesis diet  Can consider macrolide therapy if worsening symptoms as she has already failed Reglan  Continue pantoprazole 40 mg twice daily -ideally would like to drop to once daily but with daily meloxicam use I believe this is a safer alternative.  For constipation recommend she try prune juice diluted in water -this may be more tolerable than a synthetic fiber alternative.  Unfortunately she is limited from a fiber standpoint on a gastroparesis diet.    Follow Up   Return in about 6 months (around 3/9/2025) for Alejandra Tamez PA-C.    Dragon dictation used throughout this note.            Alejandra Tamez PA-C   [facility]  [address] [place] [address]  Office: [phone]

## 2024-09-10 RX ORDER — PANTOPRAZOLE SODIUM 40 MG/1
40 TABLET, DELAYED RELEASE ORAL 2 TIMES DAILY
Qty: 180 TABLET | Refills: 3 | Status: SHIPPED | OUTPATIENT
Start: 2024-09-10

## 2024-10-04 ENCOUNTER — TRANSCRIBE ORDERS (OUTPATIENT)
Dept: ADMINISTRATIVE | Facility: HOSPITAL | Age: 74
End: 2024-10-04
Payer: MEDICARE

## 2024-10-04 DIAGNOSIS — Z12.31 SCREENING MAMMOGRAM FOR BREAST CANCER: Primary | ICD-10-CM

## 2024-11-01 RX ORDER — ISOSORBIDE MONONITRATE 30 MG/1
30 TABLET, EXTENDED RELEASE ORAL DAILY
Qty: 30 TABLET | Refills: 4 | Status: SHIPPED | OUTPATIENT
Start: 2024-11-01

## 2024-11-07 NOTE — PROGRESS NOTES
"[facility] CBC GROUP OUTPATIENT FOLLOW UP CLINIC VISIT    REASON FOR FOLLOW-UP:    1.  Iron deficiency anemia.  Her last intravenous Feraheme infusions or in October 2015.  Injectafer administered on 1/4/2019.  Recurrent iron deficiency with 2 doses of Injectafer on 8/26/2020 and 9/2/2020.    HISTORY OF PRESENT ILLNESS:  Shirin Washington is a 74 y.o. female who returns today for follow up of the above issue.    She has been feeling more fatigued recently and also more lightheaded which is different than the vertigo she has at times as well.  Her primary care physician left the practice and she is getting ready to see Dr. Parkinson.    REVIEW OF SYSTEMS:  See the HPI    Vitals:    11/13/24 1402   BP: 146/71   Pulse: 76   Temp: 97.9 °F (36.6 °C)   SpO2: 97%   Weight: 53.1 kg (117 lb)   Height: 152.4 cm (60\")   PainSc: 0-No pain       PHYSICAL EXAMINATION:  General:  No acute distress, awake, alert and oriented  Skin:  Warm and dry, no visible rash  HEENT:  Normocephalic/atraumatic.   Chest:  Normal respiratory effort.  Lungs clear to auscultation bilaterally.  Heart: Regular rate and rhythm  Lymphatics: No palpable cervical supraclavicular or axillar adenopathy  Extremities:  No visible clubbing, cyanosis, or edema  Neuro/psych:  Grossly nonfocal.  Normal mood and affect.       DIAGNOSTIC DATA:  Retic With IRF & RET-He (11/13/2024 13:51)  CBC & Differential (11/13/2024 13:51)      ASSESSMENT:  This is a 74 y.o. female with:    *History of iron deficiency anemia: She required intravenous Feraheme last in October 2015 and we gave 2 more doses in September and October 2017.  No obvious GI blood loss.  I suspect she has some malabsorption.  She has symptoms consistent with gastroparesis.  She also received vitamin B12 injections in the past and she received 2 injections with her Feraheme infusions.  At the end of 2018 her ferritin had dropped considerably.  Therefore, we proceeded with one dose of Injectafer.  Ferritin was " 23 with a hgb of 11.8 on 8/19/2020. She received two doses of IV Injectafer on 8/26 and 9/2/2020.  5/14/2024: Hemoglobin mildly low at 11.7  11/13/2024: Hemoglobin 11.9    *Gastroparesis: Due to diabetes.  Stable but persistent symptoms.      *B12 deficiency: Vitamin B12 level pending today, last 368    *Chronic back pain: She has been getting steroid injections, receiving her last injections a few weeks ago.  Back pain persist.    *Leukocytosis with neutrophilia  White blood cell count normal at 9.76    *Worsening fatigue  She states that her primary care provider told her that levothyroxine should be increased but he was not going to do it at that time.  He has not left the practice.  We will check thyroid levels today.    PLAN:  Follow-up pending labs from today.  Add on a TSH and free T4  IV iron if needed for worsening iron deficiency  Assuming labs look okay I will see her back in 6 months for follow-up.  We are certainly available prior to that if needed.  To follow-up now with Dr. Parkinson for new primary care    49 minutes in this visit today reviewing her record, communicating with her, examining her, placing orders, documenting encounter.

## 2024-11-13 ENCOUNTER — OFFICE VISIT (OUTPATIENT)
Dept: ONCOLOGY | Facility: CLINIC | Age: 74
End: 2024-11-13
Payer: MEDICARE

## 2024-11-13 ENCOUNTER — LAB (OUTPATIENT)
Dept: LAB | Facility: HOSPITAL | Age: 74
End: 2024-11-13
Payer: MEDICARE

## 2024-11-13 VITALS
HEIGHT: 60 IN | SYSTOLIC BLOOD PRESSURE: 146 MMHG | HEART RATE: 76 BPM | OXYGEN SATURATION: 97 % | BODY MASS INDEX: 22.97 KG/M2 | TEMPERATURE: 97.9 F | DIASTOLIC BLOOD PRESSURE: 71 MMHG | WEIGHT: 117 LBS

## 2024-11-13 DIAGNOSIS — E53.8 B12 DEFICIENCY: ICD-10-CM

## 2024-11-13 DIAGNOSIS — D64.9 NORMOCYTIC ANEMIA: ICD-10-CM

## 2024-11-13 DIAGNOSIS — D64.9 NORMOCYTIC ANEMIA: Primary | ICD-10-CM

## 2024-11-13 DIAGNOSIS — R53.83 OTHER FATIGUE: ICD-10-CM

## 2024-11-13 LAB
ALBUMIN SERPL-MCNC: 3.6 G/DL (ref 3.5–5.2)
ALBUMIN/GLOB SERPL: 2 G/DL
ALP SERPL-CCNC: 32 U/L (ref 39–117)
ALT SERPL W P-5'-P-CCNC: 21 U/L (ref 1–33)
ANION GAP SERPL CALCULATED.3IONS-SCNC: 16.3 MMOL/L (ref 5–15)
AST SERPL-CCNC: 21 U/L (ref 1–32)
BASOPHILS # BLD AUTO: 0.05 10*3/MM3 (ref 0–0.2)
BASOPHILS NFR BLD AUTO: 0.5 % (ref 0–1.5)
BILIRUB SERPL-MCNC: 0.7 MG/DL (ref 0–1.2)
BUN SERPL-MCNC: 34 MG/DL (ref 8–23)
BUN/CREAT SERPL: 30.1 (ref 7–25)
CALCIUM SPEC-SCNC: 9.8 MG/DL (ref 8.6–10.5)
CHLORIDE SERPL-SCNC: 106 MMOL/L (ref 98–107)
CO2 SERPL-SCNC: 19.7 MMOL/L (ref 22–29)
CREAT SERPL-MCNC: 1.13 MG/DL (ref 0.57–1)
DEPRECATED RDW RBC AUTO: 51.1 FL (ref 37–54)
EGFRCR SERPLBLD CKD-EPI 2021: 50.5 ML/MIN/1.73
EOSINOPHIL # BLD AUTO: 0.35 10*3/MM3 (ref 0–0.4)
EOSINOPHIL NFR BLD AUTO: 3.6 % (ref 0.3–6.2)
ERYTHROCYTE [DISTWIDTH] IN BLOOD BY AUTOMATED COUNT: 15.6 % (ref 12.3–15.4)
FERRITIN SERPL-MCNC: 327 NG/ML (ref 13–150)
GLOBULIN UR ELPH-MCNC: 1.8 GM/DL
GLUCOSE SERPL-MCNC: 124 MG/DL (ref 65–99)
HCT VFR BLD AUTO: 36.3 % (ref 34–46.6)
HGB BLD-MCNC: 11.9 G/DL (ref 12–15.9)
HGB RETIC QN AUTO: 34.2 PG (ref 29.8–36.1)
IMM GRANULOCYTES # BLD AUTO: 0.07 10*3/MM3 (ref 0–0.05)
IMM GRANULOCYTES NFR BLD AUTO: 0.7 % (ref 0–0.5)
IMM RETICS NFR: 12.7 % (ref 3–15.8)
IRON 24H UR-MRATE: 27 MCG/DL (ref 37–145)
IRON SATN MFR SERPL: 6 % (ref 20–50)
LYMPHOCYTES # BLD AUTO: 0.88 10*3/MM3 (ref 0.7–3.1)
LYMPHOCYTES NFR BLD AUTO: 9 % (ref 19.6–45.3)
MCH RBC QN AUTO: 29.1 PG (ref 26.6–33)
MCHC RBC AUTO-ENTMCNC: 32.8 G/DL (ref 31.5–35.7)
MCV RBC AUTO: 88.8 FL (ref 79–97)
MONOCYTES # BLD AUTO: 0.75 10*3/MM3 (ref 0.1–0.9)
MONOCYTES NFR BLD AUTO: 7.7 % (ref 5–12)
NEUTROPHILS NFR BLD AUTO: 7.66 10*3/MM3 (ref 1.7–7)
NEUTROPHILS NFR BLD AUTO: 78.5 % (ref 42.7–76)
NRBC BLD AUTO-RTO: 0 /100 WBC (ref 0–0.2)
PLATELET # BLD AUTO: 238 10*3/MM3 (ref 140–450)
PMV BLD AUTO: 10.8 FL (ref 6–12)
POTASSIUM SERPL-SCNC: 4 MMOL/L (ref 3.5–5.2)
PROT SERPL-MCNC: 5.4 G/DL (ref 6–8.5)
RBC # BLD AUTO: 4.09 10*6/MM3 (ref 3.77–5.28)
RETICS # AUTO: 0.1 10*6/MM3 (ref 0.02–0.13)
RETICS/RBC NFR AUTO: 2.35 % (ref 0.7–1.9)
SODIUM SERPL-SCNC: 142 MMOL/L (ref 136–145)
T4 FREE SERPL-MCNC: 1.64 NG/DL (ref 0.92–1.68)
TIBC SERPL-MCNC: 451 MCG/DL (ref 298–536)
TRANSFERRIN SERPL-MCNC: 303 MG/DL (ref 200–360)
TSH SERPL DL<=0.05 MIU/L-ACNC: 1.21 UIU/ML (ref 0.27–4.2)
VIT B12 BLD-MCNC: 319 PG/ML (ref 211–946)
WBC NRBC COR # BLD AUTO: 9.76 10*3/MM3 (ref 3.4–10.8)

## 2024-11-13 PROCEDURE — 85025 COMPLETE CBC W/AUTO DIFF WBC: CPT

## 2024-11-13 PROCEDURE — 80053 COMPREHEN METABOLIC PANEL: CPT

## 2024-11-13 PROCEDURE — 36415 COLL VENOUS BLD VENIPUNCTURE: CPT

## 2024-11-13 PROCEDURE — 84439 ASSAY OF FREE THYROXINE: CPT | Performed by: INTERNAL MEDICINE

## 2024-11-13 PROCEDURE — 84443 ASSAY THYROID STIM HORMONE: CPT | Performed by: INTERNAL MEDICINE

## 2024-11-13 PROCEDURE — 82728 ASSAY OF FERRITIN: CPT

## 2024-11-13 PROCEDURE — 84466 ASSAY OF TRANSFERRIN: CPT

## 2024-11-13 PROCEDURE — 83540 ASSAY OF IRON: CPT

## 2024-11-13 PROCEDURE — 82607 VITAMIN B-12: CPT | Performed by: INTERNAL MEDICINE

## 2024-11-13 PROCEDURE — 85046 RETICYTE/HGB CONCENTRATE: CPT

## 2024-11-27 ENCOUNTER — HOSPITAL ENCOUNTER (OUTPATIENT)
Dept: MAMMOGRAPHY | Facility: HOSPITAL | Age: 74
Discharge: HOME | End: 2024-11-27
Admitting: INTERNAL MEDICINE
Payer: MEDICARE

## 2024-11-27 DIAGNOSIS — Z12.31 SCREENING MAMMOGRAM FOR BREAST CANCER: ICD-10-CM

## 2024-11-27 PROCEDURE — 77063 BREAST TOMOSYNTHESIS BI: CPT

## 2024-11-27 PROCEDURE — 77067 SCR MAMMO BI INCL CAD: CPT

## 2024-12-12 ENCOUNTER — TELEPHONE (OUTPATIENT)
Dept: CARDIOLOGY | Facility: CLINIC | Age: 74
End: 2024-12-12

## 2024-12-12 NOTE — TELEPHONE ENCOUNTER
"         Hub staff attempted to follow warm transfer process and was unsuccessful     Caller: Shirin Washington \"Rosi\"    Relationship to patient: Self    Best call back number:  [phone]    Patient is needing: PATIENT RETURNING CALL TO RESCHEDUL APPOINTMENT WITH ARDEN DAVIS .       IF HUB CAN RESCHEDULE PLEASE GIVE GUIDANCE  ON WITH WHOM AND TIMEFRAME, THANK YOU.          "

## 2024-12-20 ENCOUNTER — OFFICE VISIT (OUTPATIENT)
Dept: CARDIOLOGY | Facility: CLINIC | Age: 74
End: 2024-12-20
Payer: MEDICARE

## 2024-12-20 VITALS
SYSTOLIC BLOOD PRESSURE: 140 MMHG | HEART RATE: 69 BPM | WEIGHT: 115 LBS | BODY MASS INDEX: 22.58 KG/M2 | DIASTOLIC BLOOD PRESSURE: 76 MMHG | HEIGHT: 60 IN | OXYGEN SATURATION: 100 %

## 2024-12-20 DIAGNOSIS — R06.09 DOE (DYSPNEA ON EXERTION): ICD-10-CM

## 2024-12-20 DIAGNOSIS — R53.82 CHRONIC FATIGUE: ICD-10-CM

## 2024-12-20 DIAGNOSIS — E78.2 MIXED HYPERLIPIDEMIA: ICD-10-CM

## 2024-12-20 DIAGNOSIS — I10 ESSENTIAL HYPERTENSION: Primary | ICD-10-CM

## 2024-12-20 RX ORDER — ROSUVASTATIN CALCIUM 40 MG/1
1 TABLET, COATED ORAL DAILY
COMMUNITY
Start: 2024-12-16

## 2024-12-20 NOTE — PROGRESS NOTES
[facility] Follow Up Office Note     Encounter Date:24  Patient:Shirin Washington  :1950  MRN:[id number]      Chief Complaint: No chief complaint on file.        History of Presenting Illness:      Mrs.Margaret Washington is a 74 y.o. follows with Dr. Mullins and is new to me today.  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  She is here for a 1 year follow-up.    She was initially seen for significant fatigue with activity.  Previously she underwent evaluation including transthoracic echocardiogram and perfusion stress test.  Patient's stress test demonstrated an area in the anterior wall that was more consistent with breast artifact than ischemia.  The echo showed a normal left ventricular size and ejection fraction.  No segmental wall abnormalities were noted.  There was mild aortic, mitral and tricuspid valve regurgitation along with calcification of mitral annulus.  She underwent treadmill stress test with poor exercise capacity with no evidence of ischemia.  During visit 1 year ago she was doing well.  She still had some fatigue but felt like it was improving.  Blood pressure was mildly elevated.    Overall the patient reports some issues with fatigue and dizziness.  The patient has diagnosed with iron deficiency anemia which could be contributing to symptoms.  Symptoms and onset does not sound cardiac related.  Dizziness also occurs after head movement.  Patient blood pressure is still elevated today.  It looks like over the past 6 months when she has reported to any appointments her blood pressure has been elevated.  Discussed that this could be potentially related to whitecoat hypertension and patient should monitor blood pressure at home. Patient denies chest pain, worsening shortness of breath, palpitations, edema, orthopnea, and PND.      Review of  Systems:  Review of Systems   Constitutional: Positive for malaise/fatigue.   HENT: Negative.     Eyes: Negative.    Cardiovascular:  Positive for dyspnea on exertion. Negative for chest pain, irregular heartbeat, leg swelling, orthopnea, palpitations and syncope.   Respiratory:  Negative for cough, shortness of breath and snoring.    Hematologic/Lymphatic: Negative.    Skin: Negative.    Musculoskeletal: Negative.    Gastrointestinal: Negative.    Genitourinary: Negative.    Neurological:  Positive for dizziness and light-headedness. Negative for headaches.   Psychiatric/Behavioral: Negative.         Current Outpatient Medications on File Prior to Visit   Medication Sig Dispense Refill    ALPRAZolam (XANAX) 0.25 MG tablet 1 tablet 3 (Three) Times a Day As Needed.      amLODIPine (NORVASC) 5 MG tablet Take 1 tablet by mouth Daily.      aspirin 81 MG tablet Take 1 tablet by mouth Daily.      atenolol (TENORMIN) 50 MG tablet Take 1 tablet by mouth Daily. 30 tablet 6    atorvastatin (LIPITOR) 80 MG tablet Take 1 tablet by mouth. 5 times weekly      estradiol (ESTRACE) 0.1 MG/GM vaginal cream Blueberry size amount to inside of vagina 3x/week.      ezetimibe (ZETIA) 10 MG tablet Take 1 tablet by mouth Daily.      fenofibrate (TRICOR) 145 MG tablet Take 1 tablet by mouth Daily.      Insulin Degludec (TRESIBA FLEXTOUCH) 200 UNIT/ML solution pen-injector pen injection Inject 16 Units under the skin into the appropriate area as directed every night at bedtime. 16 units SQ at bedtime 2 pen 4    Insulin Pen Needle (BD PEN NEEDLE SOCO U/F) 32G X 4 MM misc 4 times daily 200 each 3    meloxicam (MOBIC) 15 MG tablet Take 1 tablet by mouth Daily.      NovoLOG FlexPen 100 UNIT/ML solution pen-injector sc pen 15 UNITS SQ TID WITH MEALS 60 mL 1    ONETOUCH DELICA LANCETS 33G misc Use.      pantoprazole (PROTONIX) 40 MG EC tablet TAKE 1 TABLET BY MOUTH 2 TIMES A  tablet 3    SYNTHROID 50 MCG tablet Take 1 tablet by mouth Daily.  30 tablet 6    vitamin D3 125 MCG (5000 UT) capsule capsule Take 1 capsule by mouth Daily.      zolpidem (AMBIEN) 10 MG tablet Take 1 tablet by mouth Every Night.       No current facility-administered medications on file prior to visit.       Allergies   Allergen Reactions    Shingrix [Zoster Vac Recomb Adjuvanted] Myalgia    Topamax [Topiramate] Unknown - Low Severity    Diphenhydramine Unknown - Low Severity       Past Medical History:   Diagnosis Date    Abnormal ECG 5/22?    Anemia     Arrhythmia 20 yrs ago    Fast heartbeat take atenolol    Arthritis     Asthma As a child    Outgrew it as an adult    Back pain     Background diabetic retinopathy of right eye determined by examination     Bilateral carotid artery stenosis     Cataract     Coronary atherosclerosis     cath 1/2014 with 50% mid LAD, otherwise unremarkable    COVID-19 07/15/2022    Diabetes mellitus type 2, insulin dependent     Esophageal varices 2014    Dint know whatvthat means but have been told i have a significant hiatal    Gastroparesis due to secondary diabetes     GERD (gastroesophageal reflux disease)     History of spinal stenosis     History of spondylolisthesis     History of URI (upper respiratory infection)     Hypercholesteremia     Hyperlipidemia     Hypertension     Hypothyroidism Approx 2017    Iron deficiency anemia     Irritable bowel syndrome Sometimes    Left-sided weakness     ARM AND LEG    Leukocytosis     MILD    Peripheral neuropathy     Postmenopausal osteoporosis 11/09/2017    Ulcer 2014    They saw a healing ulcer    Vertigo     Vitamin D deficiency        Past Surgical History:   Procedure Laterality Date    BACK SURGERY  02/2015    L4-L5 laminectomy, L4-L5, L5-S1 fusion by Dr. Urbina    CARDIAC CATHETERIZATION      ARTERIAL CATHETERIZATION    CATARACT EXTRACTION      CHOLECYSTECTOMY      COLONOSCOPY  2/15    KNEE SURGERY Right 2009    Arthroscopy    TUBAL ABDOMINAL LIGATION  1980    UPPER GASTROINTESTINAL ENDOSCOPY   2014    VAGINAL HYSTERECTOMY      fibroids, abnormal bleeding       Social History     Socioeconomic History    Marital status:      Spouse name: Yehuda   Tobacco Use    Smoking status: Never    Smokeless tobacco: Never   Vaping Use    Vaping status: Never Used   Substance and Sexual Activity    Alcohol use: Not Currently     Comment: on occasion    Drug use: No    Sexual activity: Not Currently     Partners: Male     Birth control/protection: Post-menopausal, Hysterectomy       Family History   Problem Relation Age of Onset    Polycythemia Mother         Progressed to secondary myelofibrosis    Cancer Mother          of mylofibrosis. Has polycythemia & then mylo    Hypertension Mother     Hyperlipidemia Mother     Cirrhosis Father          ar age 39    Liver disease Father     Alcohol abuse Father          at age 39    Diabetes Other     Hypertension Other     Cancer Sister         Squamish cell on back    Diabetes Maternal Grandfather     Asthma Paternal Grandfather     Heart disease Maternal Grandmother         Valve leakage    Cancer Neg Hx     Breast cancer Neg Hx     Ovarian cancer Neg Hx     Uterine cancer Neg Hx     Colon cancer Neg Hx     Deep vein thrombosis Neg Hx     Pulmonary embolism Neg Hx        The following portions of the patient's history were reviewed and updated as appropriate: allergies, current medications, past family history, past medical history, past social history, past surgical history and problem list.       Objective:       There were no vitals filed for this visit.      Physical Exam  Vitals and nursing note reviewed.   Constitutional:       Appearance: Normal appearance. She is normal weight.   Eyes:      Extraocular Movements: Extraocular movements intact.      Pupils: Pupils are equal, round, and reactive to light.   Neck:      Vascular: No carotid bruit.   Cardiovascular:      Rate and Rhythm: Normal rate and regular rhythm.      Chest Wall: PMI is not  displaced. No thrill.      Pulses: Normal pulses.      Heart sounds: Normal heart sounds.   Pulmonary:      Effort: Pulmonary effort is normal.      Breath sounds: Normal breath sounds.   Musculoskeletal:      Cervical back: Normal range of motion and neck supple.      Right lower leg: No edema.      Left lower leg: No edema.   Skin:     General: Skin is warm and dry.   Neurological:      General: No focal deficit present.      Mental Status: She is alert and oriented to person, place, and time. Mental status is at baseline.   Psychiatric:         Mood and Affect: Mood normal.         Behavior: Behavior normal.         Thought Content: Thought content normal.         Judgment: Judgment normal.               Lab Results   Component Value Date     11/13/2024     07/02/2024    K 4.0 11/13/2024    K 4.3 07/02/2024     11/13/2024     07/02/2024    CO2 19.7 (L) 11/13/2024    CO2 30.6 (H) 07/02/2024    BUN 34 (H) 11/13/2024    BUN 29 (H) 07/02/2024    CREATININE 1.13 (H) 11/13/2024    CREATININE 1.11 (H) 07/02/2024    EGFRIFNONA 60 (L) 05/11/2021    EGFRIFNONA 68 02/04/2021    EGFRIFAFRI 82 02/04/2021    EGFRIFAFRI 74 05/05/2020    GLUCOSE 124 (H) 11/13/2024    GLUCOSE 132 (H) 07/02/2024    CALCIUM 9.8 11/13/2024    CALCIUM 10.3 07/02/2024    PROTENTOTREF 6.1 02/04/2021    PROTENTOTREF 6.4 05/05/2020    ALBUMIN 3.6 11/13/2024    ALBUMIN 4.1 07/02/2024    BILITOT 0.7 11/13/2024    BILITOT 0.7 07/02/2024    AST 21 11/13/2024    AST 21 07/02/2024    ALT 21 11/13/2024    ALT 20 07/02/2024     Lab Results   Component Value Date    WBC 9.76 11/13/2024    WBC 7.29 05/14/2024    HGB 11.9 (L) 11/13/2024    HGB 11.7 (L) 05/14/2024    HCT 36.3 11/13/2024    HCT 35.7 05/14/2024    MCV 88.8 11/13/2024    MCV 90.8 05/14/2024     11/13/2024     05/14/2024     Lab Results   Component Value Date    TRIG 115 02/04/2021    TRIG 100 07/04/2014    HDL 47 02/04/2021    HDL 47 07/04/2014    LDL 87 02/04/2021  "   LDL 88 07/04/2014     No results found for: \"PROBNP\", \"BNP\"  Lab Results   Component Value Date    TROPONINT <0.010 07/15/2022     Lab Results   Component Value Date    TSH 1.210 11/13/2024    TSH 0.982 02/04/2021           ECG 12 Lead    Date/Time: 12/20/2024 2:47 PM  Performed by: Kevin Gonsales APRN    Authorized by: Kevin Gonsales APRN  Comparison: compared with previous ECG from 12/7/2023  Rhythm: sinus rhythm  Rate: normal  BPM: 69  ST Segments: ST segments normal  T Waves: T waves normal  Other: no other findings             Assessment:         Diagnoses and all orders for this visit:    1. Essential hypertension (Primary)    2. Mixed hyperlipidemia    3. ROBLES (dyspnea on exertion)    4. Chronic fatigue            Plan:       Fatigue with activity/ROBLES  Treadmill stress test completed in 2023 with poor functional capacity and no evidence of ischemia.   Echo and stress test performed in 2022. Mild valvular heart disease and a preserved ejection fraction.   noted that the abnormality found on the anterior wall is more likely to be artifact.     Hyperlipidemia:   Continue atorvastatin and Zetia therapy.    AST/ALT normal on 11/13/24  No changes indicated.     Essential hypertension:   Blood pressure is still mildly elevated today.  Discussed that this could be related to whitecoat hypertension. Patient will monitor blood pressure at home.  Discussed that if blood pressure remains elevated that she should call the office and discuss potential changes to medication regimen at that point.      Follow-up  With Dr. Mullins in 1 year      SHERI Jennings  [facility]  12/20/24  12:48 EST     "

## 2025-01-03 ENCOUNTER — LAB (OUTPATIENT)
Dept: OTHER | Facility: HOSPITAL | Age: 75
End: 2025-01-03
Payer: MEDICARE

## 2025-01-03 ENCOUNTER — INFUSION (OUTPATIENT)
Dept: ONCOLOGY | Facility: HOSPITAL | Age: 75
End: 2025-01-03
Payer: MEDICARE

## 2025-01-03 DIAGNOSIS — M81.0 OSTEOPOROSIS, UNSPECIFIED OSTEOPOROSIS TYPE, UNSPECIFIED PATHOLOGICAL FRACTURE PRESENCE: Primary | ICD-10-CM

## 2025-01-03 DIAGNOSIS — M81.0 OSTEOPOROSIS, POST-MENOPAUSAL: ICD-10-CM

## 2025-01-03 DIAGNOSIS — M81.0 OSTEOPOROSIS, UNSPECIFIED OSTEOPOROSIS TYPE, UNSPECIFIED PATHOLOGICAL FRACTURE PRESENCE: ICD-10-CM

## 2025-01-03 LAB
ALBUMIN SERPL-MCNC: 3.8 G/DL (ref 3.5–5.2)
ALBUMIN/GLOB SERPL: 1.7 G/DL
ALP SERPL-CCNC: 43 U/L (ref 39–117)
ALT SERPL W P-5'-P-CCNC: 34 U/L (ref 1–33)
ANION GAP SERPL CALCULATED.3IONS-SCNC: 3.7 MMOL/L (ref 5–15)
AST SERPL-CCNC: 34 U/L (ref 1–32)
BILIRUB SERPL-MCNC: 0.8 MG/DL (ref 0–1.2)
BUN SERPL-MCNC: 25 MG/DL (ref 8–23)
BUN/CREAT SERPL: 23.6 (ref 7–25)
CALCIUM SPEC-SCNC: 9.9 MG/DL (ref 8.6–10.5)
CHLORIDE SERPL-SCNC: 106 MMOL/L (ref 98–107)
CO2 SERPL-SCNC: 30.3 MMOL/L (ref 22–29)
CREAT SERPL-MCNC: 1.06 MG/DL (ref 0.57–1)
EGFRCR SERPLBLD CKD-EPI 2021: 54.6 ML/MIN/1.73
GLOBULIN UR ELPH-MCNC: 2.3 GM/DL
GLUCOSE SERPL-MCNC: 134 MG/DL (ref 65–99)
MAGNESIUM SERPL-MCNC: 1.9 MG/DL (ref 1.6–2.4)
PHOSPHATE SERPL-MCNC: 3.1 MG/DL (ref 2.5–4.5)
POTASSIUM SERPL-SCNC: 4.1 MMOL/L (ref 3.5–5.2)
PROT SERPL-MCNC: 6.1 G/DL (ref 6–8.5)
SODIUM SERPL-SCNC: 140 MMOL/L (ref 136–145)

## 2025-01-03 PROCEDURE — 80053 COMPREHEN METABOLIC PANEL: CPT | Performed by: INTERNAL MEDICINE

## 2025-01-03 PROCEDURE — 84100 ASSAY OF PHOSPHORUS: CPT | Performed by: INTERNAL MEDICINE

## 2025-01-03 PROCEDURE — 83735 ASSAY OF MAGNESIUM: CPT | Performed by: INTERNAL MEDICINE

## 2025-01-03 PROCEDURE — 25010000002 DENOSUMAB 60 MG/ML SOLUTION PREFILLED SYRINGE: Performed by: INTERNAL MEDICINE

## 2025-01-03 PROCEDURE — 96372 THER/PROPH/DIAG INJ SC/IM: CPT

## 2025-01-03 RX ADMIN — DENOSUMAB 60 MG: 60 INJECTION SUBCUTANEOUS at 10:31

## 2025-01-03 NOTE — NURSING NOTE
Pt arrived for prolia injection. Indication and side effects reviewed. Reviewed labs and verified medications. Encouraged pt to follow up with PCP regarding abnormal lab values, she v/u.  Pt tolerated injection well without complication. Encouraged pt to call ordering provider for any questions or concerns, and instructed on how to schedule future appts. Pt v/u and was discharged in stable condition.

## 2025-03-03 ENCOUNTER — OFFICE VISIT (OUTPATIENT)
Dept: GASTROENTEROLOGY | Facility: CLINIC | Age: 75
End: 2025-03-03
Payer: MEDICARE

## 2025-03-03 VITALS
SYSTOLIC BLOOD PRESSURE: 130 MMHG | TEMPERATURE: 97.3 F | BODY MASS INDEX: 22.7 KG/M2 | OXYGEN SATURATION: 93 % | DIASTOLIC BLOOD PRESSURE: 67 MMHG | HEIGHT: 60 IN | HEART RATE: 63 BPM | WEIGHT: 115.6 LBS

## 2025-03-03 DIAGNOSIS — K59.01 SLOW TRANSIT CONSTIPATION: Primary | ICD-10-CM

## 2025-03-03 DIAGNOSIS — K21.9 GASTROESOPHAGEAL REFLUX DISEASE WITHOUT ESOPHAGITIS: ICD-10-CM

## 2025-03-03 DIAGNOSIS — K31.84 GASTROPARESIS: ICD-10-CM

## 2025-03-03 PROCEDURE — 3075F SYST BP GE 130 - 139MM HG: CPT | Performed by: PHYSICIAN ASSISTANT

## 2025-03-03 PROCEDURE — 99214 OFFICE O/P EST MOD 30 MIN: CPT | Performed by: PHYSICIAN ASSISTANT

## 2025-03-03 PROCEDURE — 1160F RVW MEDS BY RX/DR IN RCRD: CPT | Performed by: PHYSICIAN ASSISTANT

## 2025-03-03 PROCEDURE — 3078F DIAST BP <80 MM HG: CPT | Performed by: PHYSICIAN ASSISTANT

## 2025-03-03 PROCEDURE — 1159F MED LIST DOCD IN RCRD: CPT | Performed by: PHYSICIAN ASSISTANT

## 2025-03-03 RX ORDER — ISOSORBIDE MONONITRATE 30 MG/1
TABLET, EXTENDED RELEASE ORAL
COMMUNITY
Start: 2025-02-27

## 2025-03-03 RX ORDER — PANTOPRAZOLE SODIUM 40 MG/1
40 TABLET, DELAYED RELEASE ORAL DAILY
Qty: 90 TABLET | Refills: 3 | Status: SHIPPED | OUTPATIENT
Start: 2025-03-03

## 2025-03-03 NOTE — PROGRESS NOTES
"Chief Complaint  Nausea, Constipation, Heartburn, and gastroparesis    Subjective          History Of Present Illness:    Shirin Washington is a  74 y.o. female patient of Dr. Alejandro who presents as a follow-up for GERD, gastroparesis, constipation.    Patient reports alternating diarrhea and constipation. She feels like she struggles to fully empty her bowels. She can some times go up to 3 days without a bowel movement. She reports abdominal fullness. She reports intermittent days of poor PO intake. She eats no more than a couple times a day. Patient reports nausea but no vomiting. She remains on pantoprazole 40 mg twice daily. She remains on meloxicam.  No black or bloody stools.  No abnormal weight loss.    Additional data reviewed:   EGD 7/20/23 - 1 cm hiatal hernia, single gastric polyp, non-bleeding gastric ulcer, normal duodenum. Hyperplastic gastric polyp, no H. Pylori. Nomal small bowel biopsies.   GES with 39% retention of food at 4 hours    She has previously tried and failed Reglan therapy.    Objective   Vital Signs:   /67   Pulse 63   Temp 97.3 °F (36.3 °C)   Ht 152.4 cm (60\")   Wt 52.4 kg (115 lb 9.6 oz)   SpO2 93%   BMI 22.58 kg/m²       Physical Exam  Vitals reviewed.   Constitutional:       General: She is not in acute distress.     Appearance: Normal appearance. She is not ill-appearing.   HENT:      Head: Normocephalic and atraumatic.      Nose: Nose normal.      Mouth/Throat:      Pharynx: Oropharynx is clear.   Eyes:      Conjunctiva/sclera: Conjunctivae normal.   Pulmonary:      Effort: Pulmonary effort is normal.   Abdominal:      General: There is no distension.      Palpations: Abdomen is soft. There is no mass.      Tenderness: There is no abdominal tenderness.   Musculoskeletal:         General: No swelling. Normal range of motion.      Cervical back: Normal range of motion.   Skin:     General: Skin is warm and dry.      Findings: No bruising or rash.   Neurological:      " General: No focal deficit present.      Mental Status: She is alert and oriented to person, place, and time.      Motor: No weakness.      Gait: Gait normal.   Psychiatric:         Mood and Affect: Mood normal.          Result Review :   The following data was reviewed by: Alejandra Holly PA-C on 03/03/2025:  CMP          7/2/2024    09:11 11/13/2024    13:51 1/3/2025    09:36   CMP   Glucose 132  124  134    BUN 29  34  25    Creatinine 1.11  1.13  1.06    EGFR 51.6  50.5  54.6    Sodium 143  142  140    Potassium 4.3  4.0  4.1    Chloride 105  106  106    Calcium 10.3  9.8  9.9    Total Protein 6.3  5.4  6.1    Albumin 4.1  3.6  3.8    Globulin 2.2  1.8  2.3    Total Bilirubin 0.7  0.7  0.8    Alkaline Phosphatase 43  32  43    AST (SGOT) 21  21  34    ALT (SGPT) 20  21  34    Albumin/Globulin Ratio 1.9  2.0  1.7    BUN/Creatinine Ratio 26.1  30.1  23.6    Anion Gap 7.4  16.3  3.7      CBC          5/14/2024    15:06 11/13/2024    13:51   CBC   WBC 7.29  9.76    RBC 3.93  4.09    Hemoglobin 11.7  11.9    Hematocrit 35.7  36.3    MCV 90.8  88.8    MCH 29.8  29.1    MCHC 32.8  32.8    RDW 14.2  15.6    Platelets 240  238            Assessment and Plan    Diagnoses and all orders for this visit:    1. Slow transit constipation (Primary)    2. Gastroesophageal reflux disease without esophagitis  -     pantoprazole (PROTONIX) 40 MG EC tablet; Take 1 tablet by mouth Daily.  Dispense: 90 tablet; Refill: 3    3. Gastroparesis       Patient's constipation is likely contributing to her abdominal fullness and ongoing nausea.  Recommend she start MiraLAX 1 capful daily.  She can titrate as needed based on her bowel movements.  Encouraged her to increase her protein intake and find ways to incorporate cooked vegetables to increase her fiber intake.  Continue pantoprazole - we will decrease to 40 mg once daily    Follow Up   Return in about 3 months (around 6/3/2025) for Alejandra Tamez PA-C.    Bulmaro dictation used  throughout this note.            Alejandra Tamez PA-C   [facility]  [address] [place] [address]  Office: [phone]

## 2025-05-21 NOTE — PROGRESS NOTES
"[facility] CBC GROUP OUTPATIENT FOLLOW UP CLINIC VISIT    REASON FOR FOLLOW-UP:    1.  Iron deficiency anemia.  Her last intravenous Feraheme infusions or in October 2015.  Injectafer administered on 1/4/2019.  Recurrent iron deficiency with 2 doses of Injectafer on 8/26/2020 and 9/2/2020.    HISTORY OF PRESENT ILLNESS:  Shirin Washington is a 74 y.o. female who returns today for follow up of the above issue.    Overall she is doing about the same.  She now follows with Dr. Dumont for primary care.  She states her liver labs have been a little high and her cholesterol medicine was adjusted.  Gastroparesis symptoms are stable.  She stopped getting back injections as they were not really helping.  She notes ongoing fatigue.    REVIEW OF SYSTEMS:  See the HPI    Vitals:    05/27/25 1422   BP: 167/68   Pulse: 65   Resp: 16   Temp: 97.7 °F (36.5 °C)   TempSrc: Oral   SpO2: 98%   Weight: 51.6 kg (113 lb 12.8 oz)   Height: 152.4 cm (60\")   PainSc: 0-No pain     PHYSICAL EXAMINATION:  General:  No acute distress, awake, alert and oriented  Skin:  Warm and dry, no visible rash  HEENT:  Normocephalic/atraumatic.   Chest:  Normal respiratory effort.  Lungs clear to auscultation bilaterally.  Heart: Regular rate and rhythm  Lymphatics: No palpable cervical supraclavicular or axillary adenopathy  Extremities:  No visible clubbing, cyanosis, or edema  Neuro/psych:  Grossly nonfocal.  Normal mood and affect.       DIAGNOSTIC DATA:  CBC & Differential (05/27/2025 13:41)  Comprehensive Metabolic Panel (05/27/2025 13:41)  Ferritin (05/27/2025 13:41)  Iron Profile (05/27/2025 13:41)  Retic With IRF & RET-He (05/27/2025 13:41)      ASSESSMENT:  This is a 74 y.o. female with:    *History of iron deficiency anemia: She required intravenous Feraheme last in October 2015 and we gave 2 more doses in September and October 2017.  No obvious GI blood loss.  I suspect she has some malabsorption.  She has symptoms consistent with " gastroparesis.  She also received vitamin B12 injections in the past and she received 2 injections with her Feraheme infusions.  At the end of 2018 her ferritin had dropped considerably.  Therefore, we proceeded with one dose of Injectafer.  Ferritin was 23 with a hgb of 11.8 on 8/19/2020. She received two doses of IV Injectafer on 8/26 and 9/2/2020.  5/14/2024: Hemoglobin mildly low at 11.7  11/13/2024: Hemoglobin 11.9  5/27/2025: Hemoglobin 12.8, hematocrit 38.7%    *Gastroparesis: Due to diabetes.  Stable symptoms.      *B12 deficiency: Vitamin B12 level pending today, last 319    *Chronic back pain: She has been getting steroid injections, receiving her last injections a few weeks ago.  Back pain persists.  Not planning to receive any further injections.    *Leukocytosis with neutrophilia  White blood cell count normal at 10.24    *fatigue  Stable    PLAN:  Follow-up pending vitamin B12 level from today.  She continues an oral dissolvable supplement  IV iron if needed for worsening iron deficiency.  Not required at this time.  Follow-up in 6 months with labs  She will follow-up now with Dr. Dumont for primary care.  I sent her labs to her today with attention to her transaminases.    05/27/25  Axel Lobato MD

## 2025-05-27 ENCOUNTER — LAB (OUTPATIENT)
Dept: LAB | Facility: HOSPITAL | Age: 75
End: 2025-05-27
Payer: MEDICARE

## 2025-05-27 ENCOUNTER — OFFICE VISIT (OUTPATIENT)
Dept: ONCOLOGY | Facility: CLINIC | Age: 75
End: 2025-05-27
Payer: MEDICARE

## 2025-05-27 VITALS
OXYGEN SATURATION: 98 % | WEIGHT: 113.8 LBS | BODY MASS INDEX: 22.34 KG/M2 | RESPIRATION RATE: 16 BRPM | SYSTOLIC BLOOD PRESSURE: 167 MMHG | TEMPERATURE: 97.7 F | DIASTOLIC BLOOD PRESSURE: 68 MMHG | HEIGHT: 60 IN | HEART RATE: 65 BPM

## 2025-05-27 DIAGNOSIS — D64.9 NORMOCYTIC ANEMIA: ICD-10-CM

## 2025-05-27 DIAGNOSIS — E53.8 B12 DEFICIENCY: ICD-10-CM

## 2025-05-27 DIAGNOSIS — E53.8 B12 DEFICIENCY: Primary | ICD-10-CM

## 2025-05-27 DIAGNOSIS — R53.83 OTHER FATIGUE: ICD-10-CM

## 2025-05-27 LAB
ALBUMIN SERPL-MCNC: 4.2 G/DL (ref 3.5–5.2)
ALBUMIN/GLOB SERPL: 1.9 G/DL
ALP SERPL-CCNC: 69 U/L (ref 39–117)
ALT SERPL W P-5'-P-CCNC: 49 U/L (ref 1–33)
ANION GAP SERPL CALCULATED.3IONS-SCNC: 10.2 MMOL/L (ref 5–15)
AST SERPL-CCNC: 45 U/L (ref 1–32)
BASOPHILS # BLD AUTO: 0.09 10*3/MM3 (ref 0–0.2)
BASOPHILS NFR BLD AUTO: 0.9 % (ref 0–1.5)
BILIRUB SERPL-MCNC: 1.2 MG/DL (ref 0–1.2)
BUN SERPL-MCNC: 16.2 MG/DL (ref 8–23)
BUN/CREAT SERPL: 16.9 (ref 7–25)
CALCIUM SPEC-SCNC: 9.7 MG/DL (ref 8.6–10.5)
CHLORIDE SERPL-SCNC: 106 MMOL/L (ref 98–107)
CO2 SERPL-SCNC: 24.8 MMOL/L (ref 22–29)
CREAT SERPL-MCNC: 0.96 MG/DL (ref 0.57–1)
DEPRECATED RDW RBC AUTO: 49 FL (ref 37–54)
EGFRCR SERPLBLD CKD-EPI 2021: 61.4 ML/MIN/1.73
EOSINOPHIL # BLD AUTO: 0.75 10*3/MM3 (ref 0–0.4)
EOSINOPHIL NFR BLD AUTO: 7.3 % (ref 0.3–6.2)
ERYTHROCYTE [DISTWIDTH] IN BLOOD BY AUTOMATED COUNT: 15.2 % (ref 12.3–15.4)
FERRITIN SERPL-MCNC: 229 NG/ML (ref 13–150)
GLOBULIN UR ELPH-MCNC: 2.2 GM/DL
GLUCOSE SERPL-MCNC: 133 MG/DL (ref 65–99)
HCT VFR BLD AUTO: 38.7 % (ref 34–46.6)
HGB BLD-MCNC: 12.8 G/DL (ref 12–15.9)
HGB RETIC QN AUTO: 33.2 PG (ref 29.8–36.1)
IMM GRANULOCYTES # BLD AUTO: 0.06 10*3/MM3 (ref 0–0.05)
IMM GRANULOCYTES NFR BLD AUTO: 0.6 % (ref 0–0.5)
IMM RETICS NFR: 15.9 % (ref 3–15.8)
IRON 24H UR-MRATE: 65 MCG/DL (ref 37–145)
IRON SATN MFR SERPL: 19 % (ref 20–50)
LYMPHOCYTES # BLD AUTO: 1.58 10*3/MM3 (ref 0.7–3.1)
LYMPHOCYTES NFR BLD AUTO: 15.4 % (ref 19.6–45.3)
MCH RBC QN AUTO: 29.1 PG (ref 26.6–33)
MCHC RBC AUTO-ENTMCNC: 33.1 G/DL (ref 31.5–35.7)
MCV RBC AUTO: 88 FL (ref 79–97)
MONOCYTES # BLD AUTO: 0.74 10*3/MM3 (ref 0.1–0.9)
MONOCYTES NFR BLD AUTO: 7.2 % (ref 5–12)
NEUTROPHILS NFR BLD AUTO: 68.6 % (ref 42.7–76)
NEUTROPHILS NFR BLD AUTO: 7.02 10*3/MM3 (ref 1.7–7)
NRBC BLD AUTO-RTO: 0 /100 WBC (ref 0–0.2)
PLATELET # BLD AUTO: 228 10*3/MM3 (ref 140–450)
PMV BLD AUTO: 11.3 FL (ref 6–12)
POTASSIUM SERPL-SCNC: 4.5 MMOL/L (ref 3.5–5.2)
PROT SERPL-MCNC: 6.4 G/DL (ref 6–8.5)
RBC # BLD AUTO: 4.4 10*6/MM3 (ref 3.77–5.28)
RETICS # AUTO: 0.11 10*6/MM3 (ref 0.02–0.13)
RETICS/RBC NFR AUTO: 2.43 % (ref 0.7–1.9)
SODIUM SERPL-SCNC: 141 MMOL/L (ref 136–145)
TIBC SERPL-MCNC: 343 MCG/DL (ref 298–536)
TRANSFERRIN SERPL-MCNC: 230 MG/DL (ref 200–360)
VIT B12 BLD-MCNC: 390 PG/ML (ref 211–946)
WBC NRBC COR # BLD AUTO: 10.24 10*3/MM3 (ref 3.4–10.8)

## 2025-05-27 PROCEDURE — 82728 ASSAY OF FERRITIN: CPT

## 2025-05-27 PROCEDURE — 83540 ASSAY OF IRON: CPT

## 2025-05-27 PROCEDURE — 82607 VITAMIN B-12: CPT | Performed by: INTERNAL MEDICINE

## 2025-05-27 PROCEDURE — 80053 COMPREHEN METABOLIC PANEL: CPT

## 2025-05-27 PROCEDURE — 85046 RETICYTE/HGB CONCENTRATE: CPT

## 2025-05-27 PROCEDURE — 84466 ASSAY OF TRANSFERRIN: CPT

## 2025-05-27 PROCEDURE — 85025 COMPLETE CBC W/AUTO DIFF WBC: CPT

## 2025-05-27 PROCEDURE — 36415 COLL VENOUS BLD VENIPUNCTURE: CPT

## 2025-06-13 ENCOUNTER — OFFICE VISIT (OUTPATIENT)
Dept: ORTHOPEDIC SURGERY | Facility: CLINIC | Age: 75
End: 2025-06-13
Payer: MEDICARE

## 2025-06-13 VITALS — TEMPERATURE: 98.6 F | WEIGHT: 114.6 LBS | BODY MASS INDEX: 22.5 KG/M2 | HEIGHT: 60 IN

## 2025-06-13 DIAGNOSIS — M17.11 PRIMARY OSTEOARTHRITIS OF RIGHT KNEE: ICD-10-CM

## 2025-06-13 DIAGNOSIS — R52 PAIN: Primary | ICD-10-CM

## 2025-06-13 RX ORDER — ALPRAZOLAM 0.25 MG
0.25 TABLET ORAL 2 TIMES DAILY
COMMUNITY

## 2025-06-13 RX ORDER — ZOLPIDEM TARTRATE 10 MG/1
10 TABLET ORAL DAILY
COMMUNITY
Start: 2025-03-07

## 2025-06-13 RX ORDER — IMIQUIMOD 12.5 MG/.25G
CREAM TOPICAL
COMMUNITY

## 2025-06-13 RX ORDER — METHYLPREDNISOLONE ACETATE 80 MG/ML
80 INJECTION, SUSPENSION INTRA-ARTICULAR; INTRALESIONAL; INTRAMUSCULAR; SOFT TISSUE
Status: COMPLETED | OUTPATIENT
Start: 2025-06-13 | End: 2025-06-13

## 2025-06-13 RX ORDER — VALSARTAN 80 MG/1
TABLET ORAL
COMMUNITY
Start: 2025-06-12

## 2025-06-13 RX ORDER — INSULIN DEGLUDEC 100 U/ML
INJECTION, SOLUTION SUBCUTANEOUS
COMMUNITY
Start: 2025-04-29

## 2025-06-13 RX ADMIN — METHYLPREDNISOLONE ACETATE 80 MG: 80 INJECTION, SUSPENSION INTRA-ARTICULAR; INTRALESIONAL; INTRAMUSCULAR; SOFT TISSUE at 11:30

## 2025-06-13 NOTE — PROGRESS NOTES
Patient: Shirin Washington  YOB: 1950 75 y.o. female  Medical Record Number: [id number]    Chief Complaints:   Chief Complaint   Patient presents with   • Right Knee - Initial Evaluation       History of Present Illness:Shirin Washington is a 75 y.o. female who presents as a new patient both myself as well as to the practice with complaints of right knee pain.  Patient reports that started about a year ago, unfortunately pain has progressively gotten worse.  She denies any specific injury.  She reports the pain is worse with increased activity    Allergies:   Allergies   Allergen Reactions   • Shingrix [Zoster Vac Recomb Adjuvanted] Myalgia   • Topiramate Unknown - Low Severity and Headache   • Diphenhydramine Unknown - Low Severity and Other (See Comments)       Medications:   Current Outpatient Medications   Medication Sig Dispense Refill   • ALPRAZolam (XANAX) 0.25 MG tablet 1 tablet 3 (Three) Times a Day As Needed.     • ALPRAZolam (XANAX) 0.25 MG tablet Take 1 tablet by mouth 2 (Two) Times a Day.     • amLODIPine (NORVASC) 5 MG tablet Take 1 tablet by mouth Daily.     • aspirin 81 MG tablet Take 1 tablet by mouth Daily.     • atenolol (TENORMIN) 50 MG tablet Take 1 tablet by mouth Daily. 30 tablet 6   • atorvastatin (LIPITOR) 80 MG tablet Take 1 tablet by mouth. 5 times weekly     • estradiol (ESTRACE) 0.1 MG/GM vaginal cream Blueberry size amount to inside of vagina 3x/week.     • ezetimibe (ZETIA) 10 MG tablet Take 1 tablet by mouth Daily.     • imiquimod (ALDARA) 5 % cream      • Insulin Degludec (TRESIBA FLEXTOUCH) 200 UNIT/ML solution pen-injector pen injection Inject 16 Units under the skin into the appropriate area as directed every night at bedtime. 16 units SQ at bedtime 2 pen 4   • Insulin Pen Needle (BD PEN NEEDLE SOCO U/F) 32G X 4 MM misc 4 times daily 200 each 3   • isosorbide mononitrate (IMDUR) 30 MG 24 hr tablet      • meloxicam (MOBIC) 15 MG tablet Take 1 tablet by mouth Daily.     •  "NON FORMULARY D-ammosia (bladder) once a day     • NovoLOG FlexPen 100 UNIT/ML solution pen-injector sc pen 15 UNITS SQ TID WITH MEALS 60 mL 1   • ONETOUCH DELICA LANCETS 33G misc Use.     • pantoprazole (PROTONIX) 40 MG EC tablet Take 1 tablet by mouth Daily. 90 tablet 3   • SYNTHROID 50 MCG tablet Take 1 tablet by mouth Daily. 30 tablet 6   • Tresiba FlexTouch 100 UNIT/ML solution pen-injector injection      • valsartan (DIOVAN) 80 MG tablet      • vitamin D3 125 MCG (5000 UT) capsule capsule Take 1 capsule by mouth Daily.     • zolpidem (AMBIEN) 10 MG tablet Take 1 tablet by mouth Every Night.     • zolpidem (AMBIEN) 10 MG tablet Take 1 tablet by mouth Daily.       No current facility-administered medications for this visit.         The following portions of the patient's history were reviewed and updated as appropriate: allergies, current medications, past family history, past medical history, past social history, past surgical history and problem list.    Review of Systems:   14 point review of systems was performed. All systems negative except pertinent positives/negatives listed in HPI above    Physical Exam:   Vitals:    06/13/25 0907   Temp: 98.6 °F (37 °C)   Weight: 52 kg (114 lb 9.6 oz)   Height: 152.4 cm (60\")       General: A and O x 3, ASA, NAD   Clear no unusual lesions noted  Right knee patient has no appreciable effusion 118 degrees flexion +2 degrees in extension with a positive Will negative [place] calf soft and nontender       Radiology:  Xrays 3views (ap,lateral, sunrise) right knee were ordered and reviewed today secondary to increased pain show areas of significant osteoarthritis.  Comparative views are not available    Assessment/Plan: Osteo arthritis right knee with increased pain    Patient and I discussed options including conservative measures, she would like to try right knee cortisone injection, physical therapy, prescription given for ketoprofen lidocaine cream to be used " topically, she will let me know if her symptoms do not improve    Large Joint Arthrocentesis: R knee  Date/Time: 6/13/2025 11:30 AM  Consent given by: patient  Site marked: site marked  Timeout: Immediately prior to procedure a time out was called to verify the correct patient, procedure, equipment, support staff and site/side marked as required   Supporting Documentation  Indications: pain and joint swelling   Procedure Details  Location: knee - R knee  Preparation: Patient was prepped and draped in the usual sterile fashion  Needle size: 22 G  Approach: anterolateral  Medications administered: 80 mg methylPREDNISolone acetate 80 MG/ML; 2 mL lidocaine (cardiac)  Patient tolerance: patient tolerated the procedure well with no immediate complications           Asmita Vicente, APRN  6/13/2025

## 2025-06-23 ENCOUNTER — OFFICE VISIT (OUTPATIENT)
Dept: GASTROENTEROLOGY | Facility: CLINIC | Age: 75
End: 2025-06-23
Payer: MEDICARE

## 2025-06-23 VITALS
BODY MASS INDEX: 22.3 KG/M2 | HEART RATE: 64 BPM | HEIGHT: 60 IN | TEMPERATURE: 97.5 F | WEIGHT: 113.6 LBS | DIASTOLIC BLOOD PRESSURE: 80 MMHG | SYSTOLIC BLOOD PRESSURE: 155 MMHG

## 2025-06-23 DIAGNOSIS — R79.89 ELEVATED LIVER FUNCTION TESTS: ICD-10-CM

## 2025-06-23 DIAGNOSIS — K31.84 GASTROPARESIS: Primary | ICD-10-CM

## 2025-06-23 DIAGNOSIS — K59.01 SLOW TRANSIT CONSTIPATION: ICD-10-CM

## 2025-06-23 DIAGNOSIS — R10.9 ABDOMINAL CRAMPING: ICD-10-CM

## 2025-06-23 DIAGNOSIS — K21.9 GASTROESOPHAGEAL REFLUX DISEASE WITHOUT ESOPHAGITIS: ICD-10-CM

## 2025-06-23 PROCEDURE — 1159F MED LIST DOCD IN RCRD: CPT | Performed by: PHYSICIAN ASSISTANT

## 2025-06-23 PROCEDURE — 99214 OFFICE O/P EST MOD 30 MIN: CPT | Performed by: PHYSICIAN ASSISTANT

## 2025-06-23 PROCEDURE — 3077F SYST BP >= 140 MM HG: CPT | Performed by: PHYSICIAN ASSISTANT

## 2025-06-23 PROCEDURE — 3079F DIAST BP 80-89 MM HG: CPT | Performed by: PHYSICIAN ASSISTANT

## 2025-06-23 PROCEDURE — 1160F RVW MEDS BY RX/DR IN RCRD: CPT | Performed by: PHYSICIAN ASSISTANT

## 2025-06-23 NOTE — PROGRESS NOTES
"Chief Complaint  Heartburn, Constipation, Abdominal Pain, and Nausea    Subjective          History Of Present Illness:    Shirin Washington is a  75 y.o. female patient of Dr. Alejandro who presents as a follow-up for GERD, gastroparesis, constipation.     Patient reports he bowels are moving more regularly. Has to use miralax as needed - if she takes it too often she can have liquid diarrhea. Patient has been incorporating different vegetables avocado and [place] sprouts to help her bowels move better. Patient does still have some nausea, can be before or after eating. Patient does get some abdominal cramping, primarily on the right side that can sometimes radiate down the left side of her body. Typically resolves spontaneously and is not associated with bowel movement. No black or bloody stools.  No abnormal weight loss or poor appetite.    She remains on pantoprazole once daily. Controls GERD symptoms.     Liver enzymes reviewed. CMP 5/27/25 - ALT 49, AST 45. Normal Tbili and ALP.     Additional data reviewed:   EGD 7/20/23 - 1 cm hiatal hernia, single gastric polyp, non-bleeding gastric ulcer, normal duodenum. Hyperplastic gastric polyp, no H. Pylori. Nomal small bowel biopsies.   GES with 39% retention of food at 4 hours     She has previously tried and failed Reglan therapy.    Objective   Vital Signs:   /80   Pulse 64   Temp 97.5 °F (36.4 °C)   Ht 152.4 cm (60\")   Wt 51.5 kg (113 lb 9.6 oz)   BMI 22.19 kg/m²       Physical Exam  Vitals reviewed.   Constitutional:       General: She is not in acute distress.     Appearance: Normal appearance. She is not ill-appearing.   HENT:      Head: Normocephalic and atraumatic.      Nose: Nose normal.      Mouth/Throat:      Pharynx: Oropharynx is clear.   Eyes:      Conjunctiva/sclera: Conjunctivae normal.   Pulmonary:      Effort: Pulmonary effort is normal.   Abdominal:      General: There is no distension.      Palpations: Abdomen is soft. There is no mass. "      Tenderness: There is no abdominal tenderness.   Musculoskeletal:         General: No swelling. Normal range of motion.      Cervical back: Normal range of motion.   Skin:     General: Skin is warm and dry.      Findings: No bruising or rash.   Neurological:      General: No focal deficit present.      Mental Status: She is alert and oriented to person, place, and time.      Motor: No weakness.      Gait: Gait normal.   Psychiatric:         Mood and Affect: Mood normal.          Result Review :   The following data was reviewed by: Alejandra Holly PA-C on 06/23/2025:  CMP          11/13/2024    13:51 1/3/2025    09:36 5/27/2025    13:41   CMP   Glucose 124  134  133    BUN 34  25  16.2    Creatinine 1.13  1.06  0.96    EGFR 50.5  54.6  61.4    Sodium 142  140  141    Potassium 4.0  4.1  4.5    Chloride 106  106  106    Calcium 9.8  9.9  9.7    Total Protein 5.4  6.1  6.4    Albumin 3.6  3.8  4.2    Globulin 1.8  2.3  2.2    Total Bilirubin 0.7  0.8  1.2    Alkaline Phosphatase 32  43  69    AST (SGOT) 21  34  45    ALT (SGPT) 21  34  49    Albumin/Globulin Ratio 2.0  1.7  1.9    BUN/Creatinine Ratio 30.1  23.6  16.9    Anion Gap 16.3  3.7  10.2      CBC          11/13/2024    13:51 5/27/2025    13:41   CBC   WBC 9.76  10.24    RBC 4.09  4.40    Hemoglobin 11.9  12.8    Hematocrit 36.3  38.7    MCV 88.8  88.0    MCH 29.1  29.1    MCHC 32.8  33.1    RDW 15.6  15.2    Platelets 238  228            Assessment and Plan    Diagnoses and all orders for this visit:    1. Gastroparesis (Primary)    2. Gastroesophageal reflux disease without esophagitis    3. Slow transit constipation    4. Elevated liver function tests  -     US Liver; Future    5. Abdominal cramping       Continue gastroparesis diet  Continue incorporating high-fiber foods as tolerated for constipation  Continue PPI therapy  Trial of IBgard for abdominal cramping  Per ultrasound, patient has had mildly elevated LFTs for many years, slightly  increased over the last year. Likely fatty liver related with her history of diabetes and hyperlipidemia.     Follow Up   Return in about 6 months (around 12/23/2025) for Alejandra Tamez PA-C.    Dragon dictation used throughout this note.            Alejandra Tamez PA-C   [facility]  [address] [place] [address]  Office: [phone]

## 2025-07-02 DIAGNOSIS — M81.0 OSTEOPOROSIS, UNSPECIFIED OSTEOPOROSIS TYPE, UNSPECIFIED PATHOLOGICAL FRACTURE PRESENCE: ICD-10-CM

## 2025-07-02 DIAGNOSIS — M81.0 OSTEOPOROSIS, POST-MENOPAUSAL: Primary | ICD-10-CM

## 2025-07-18 ENCOUNTER — HOSPITAL ENCOUNTER (OUTPATIENT)
Facility: HOSPITAL | Age: 75
Discharge: HOME | End: 2025-07-18
Payer: MEDICARE

## 2025-07-18 DIAGNOSIS — R79.89 ELEVATED LIVER FUNCTION TESTS: ICD-10-CM

## 2025-07-18 PROCEDURE — 76705 ECHO EXAM OF ABDOMEN: CPT

## 2025-07-22 ENCOUNTER — INFUSION (OUTPATIENT)
Dept: ONCOLOGY | Facility: HOSPITAL | Age: 75
End: 2025-07-22
Payer: MEDICARE

## 2025-07-22 ENCOUNTER — LAB (OUTPATIENT)
Dept: OTHER | Facility: HOSPITAL | Age: 75
End: 2025-07-22
Payer: MEDICARE

## 2025-07-22 DIAGNOSIS — M81.0 OSTEOPOROSIS, UNSPECIFIED OSTEOPOROSIS TYPE, UNSPECIFIED PATHOLOGICAL FRACTURE PRESENCE: Primary | ICD-10-CM

## 2025-07-22 DIAGNOSIS — M81.0 OSTEOPOROSIS, POST-MENOPAUSAL: ICD-10-CM

## 2025-07-22 DIAGNOSIS — M81.0 OSTEOPOROSIS, UNSPECIFIED OSTEOPOROSIS TYPE, UNSPECIFIED PATHOLOGICAL FRACTURE PRESENCE: ICD-10-CM

## 2025-07-22 LAB
ALBUMIN SERPL-MCNC: 3.7 G/DL (ref 3.5–5.2)
ALBUMIN/GLOB SERPL: 1.5 G/DL
ALP SERPL-CCNC: 71 U/L (ref 39–117)
ALT SERPL W P-5'-P-CCNC: 31 U/L (ref 1–33)
ANION GAP SERPL CALCULATED.3IONS-SCNC: 6.3 MMOL/L (ref 5–15)
AST SERPL-CCNC: 31 U/L (ref 1–32)
BILIRUB SERPL-MCNC: 1 MG/DL (ref 0–1.2)
BUN SERPL-MCNC: 20.3 MG/DL (ref 8–23)
BUN/CREAT SERPL: 19.7 (ref 7–25)
CALCIUM SPEC-SCNC: 10 MG/DL (ref 8.6–10.5)
CHLORIDE SERPL-SCNC: 105 MMOL/L (ref 98–107)
CO2 SERPL-SCNC: 28.7 MMOL/L (ref 22–29)
CREAT SERPL-MCNC: 1.03 MG/DL (ref 0.57–1)
EGFRCR SERPLBLD CKD-EPI 2021: 56.1 ML/MIN/1.73
GLOBULIN UR ELPH-MCNC: 2.5 GM/DL
GLUCOSE SERPL-MCNC: 222 MG/DL (ref 65–99)
MAGNESIUM SERPL-MCNC: 1.8 MG/DL (ref 1.6–2.4)
PHOSPHATE SERPL-MCNC: 4 MG/DL (ref 2.5–4.5)
POTASSIUM SERPL-SCNC: 4.5 MMOL/L (ref 3.5–5.2)
PROT SERPL-MCNC: 6.2 G/DL (ref 6–8.5)
SODIUM SERPL-SCNC: 140 MMOL/L (ref 136–145)

## 2025-07-22 PROCEDURE — 96372 THER/PROPH/DIAG INJ SC/IM: CPT

## 2025-07-22 PROCEDURE — 80053 COMPREHEN METABOLIC PANEL: CPT | Performed by: INTERNAL MEDICINE

## 2025-07-22 PROCEDURE — 83735 ASSAY OF MAGNESIUM: CPT | Performed by: INTERNAL MEDICINE

## 2025-07-22 PROCEDURE — 25010000002 DENOSUMAB 60 MG/ML SOLUTION PREFILLED SYRINGE: Performed by: INTERNAL MEDICINE

## 2025-07-22 PROCEDURE — 84100 ASSAY OF PHOSPHORUS: CPT | Performed by: INTERNAL MEDICINE

## 2025-07-22 RX ADMIN — DENOSUMAB 60 MG: 60 INJECTION SUBCUTANEOUS at 09:46

## 2025-08-05 ENCOUNTER — HOSPITAL ENCOUNTER (OUTPATIENT)
Dept: GENERAL RADIOLOGY | Facility: HOSPITAL | Age: 75
Discharge: HOME | End: 2025-08-05
Admitting: INTERNAL MEDICINE
Payer: MEDICARE

## 2025-08-05 DIAGNOSIS — M54.50 LUMBAR PAIN: ICD-10-CM

## 2025-08-05 PROCEDURE — 72110 X-RAY EXAM L-2 SPINE 4/>VWS: CPT

## 2025-08-26 ENCOUNTER — TRANSCRIBE ORDERS (OUTPATIENT)
Dept: ADMINISTRATIVE | Facility: HOSPITAL | Age: 75
End: 2025-08-26
Payer: MEDICARE

## 2025-08-26 DIAGNOSIS — I65.22 CAROTID STENOSIS, LEFT: Primary | ICD-10-CM
